# Patient Record
Sex: MALE | Race: WHITE | NOT HISPANIC OR LATINO | URBAN - METROPOLITAN AREA
[De-identification: names, ages, dates, MRNs, and addresses within clinical notes are randomized per-mention and may not be internally consistent; named-entity substitution may affect disease eponyms.]

---

## 2017-02-01 ENCOUNTER — HOSPITAL ENCOUNTER (OUTPATIENT)
Dept: MEDSURG UNIT | Facility: HOSPITAL | Age: 57
End: 2017-02-03
Attending: INTERNAL MEDICINE | Admitting: INTERNAL MEDICINE

## 2017-03-01 ENCOUNTER — HISTORICAL (OUTPATIENT)
Dept: SLEEP MEDICINE | Facility: HOSPITAL | Age: 57
End: 2017-03-01

## 2017-05-11 ENCOUNTER — HISTORICAL (OUTPATIENT)
Dept: ADMINISTRATIVE | Facility: HOSPITAL | Age: 57
End: 2017-05-11

## 2017-05-11 LAB
ABS NEUT (OLG): 0.78 X10(3)/MCL
ALBUMIN SERPL-MCNC: 4 GM/DL (ref 3.4–5)
ALBUMIN/GLOB SERPL: 1 {RATIO}
ALP SERPL-CCNC: 83 UNIT/L (ref 20–120)
ALT SERPL-CCNC: 24 UNIT/L
ANISOCYTOSIS BLD QL SMEAR: ABNORMAL
AST SERPL-CCNC: 34 UNIT/L
BASOPHILS NFR BLD MANUAL: 0 %
BILIRUB SERPL-MCNC: 0.9 MG/DL
BILIRUBIN DIRECT+TOT PNL SERPL-MCNC: 0.2 MG/DL
BILIRUBIN DIRECT+TOT PNL SERPL-MCNC: 0.7 MG/DL
BUN SERPL-MCNC: 11 MG/DL (ref 7–25)
CALCIUM SERPL-MCNC: 8.5 MG/DL (ref 8.4–10.3)
CHLORIDE SERPL-SCNC: 81 MMOL/L (ref 96–110)
CO2 SERPL-SCNC: 31 MMOL/L (ref 24–32)
CREAT SERPL-MCNC: 1.04 MG/DL (ref 0.7–1.4)
DACRYOCYTES BLD QL SMEAR: ABNORMAL
EOSINOPHIL NFR BLD MANUAL: 6 %
ERYTHROCYTE [DISTWIDTH] IN BLOOD BY AUTOMATED COUNT: 16.2 % (ref 11.5–14.5)
GLOBULIN SER-MCNC: 2.8 GM/ML (ref 2.3–3.5)
GLUCOSE SERPL-MCNC: 99 MG/DL (ref 65–99)
GRANULOCYTES NFR BLD MANUAL: 42 %
HCT VFR BLD AUTO: 28.5 % (ref 40–51)
HGB BLD-MCNC: 9.8 GM/DL (ref 13.5–17.5)
HYPOCHROMIA BLD QL SMEAR: ABNORMAL
LDH SERPL-CCNC: 297 UNIT/L
LYMPHOCYTES NFR BLD MANUAL: 10 %
LYMPHOCYTES NFR BLD MANUAL: 11 %
MACROCYTES BLD QL SMEAR: ABNORMAL
MCH RBC QN AUTO: 29.6 PG (ref 26–34)
MCHC RBC AUTO-ENTMCNC: 34.4 GM/DL (ref 31–37)
MCV RBC AUTO: 86.1 FL (ref 80–100)
METAMYELOCYTES NFR BLD MANUAL: 4 %
MICROCYTES BLD QL SMEAR: ABNORMAL
MONOCYTES NFR BLD MANUAL: 16 %
MYELOCYTES NFR BLD MANUAL: 0 %
NEUTS BAND NFR BLD MANUAL: 7 %
NRBC BLD MANUAL-RTO: 10 %
OVALOCYTES BLD QL SMEAR: ABNORMAL
PLATELET # BLD AUTO: 81 X10(3)/MCL (ref 130–400)
PLATELET # BLD EST: ABNORMAL 10*3/UL
PMV BLD AUTO: 10 FL (ref 7.4–10.4)
POIKILOCYTOSIS BLD QL SMEAR: ABNORMAL
POLYCHROMASIA BLD QL SMEAR: ABNORMAL
POTASSIUM SERPL-SCNC: 4.9 MMOL/L (ref 3.6–5.2)
PROT SERPL-MCNC: 6.8 GM/DL (ref 6–8)
RBC # BLD AUTO: 3.31 X10(6)/MCL (ref 4.5–5.9)
RBC MORPH BLD: ABNORMAL
SODIUM SERPL-SCNC: 121 MMOL/L (ref 135–146)
SPHEROCYTES BLD QL SMEAR: ABNORMAL
WBC # SPEC AUTO: 2 X10(3)/MCL (ref 4.5–11)

## 2017-05-25 ENCOUNTER — HISTORICAL (OUTPATIENT)
Dept: SURGERY | Facility: HOSPITAL | Age: 57
End: 2017-05-25

## 2017-05-25 LAB
INR PPP: 1.26 (ref 0.9–1.2)
PLATELET # BLD AUTO: 91 X10(3)/MCL (ref 130–400)
PROTHROMBIN TIME: 15.6 SECOND(S) (ref 11.9–14.4)

## 2017-06-08 ENCOUNTER — HISTORICAL (OUTPATIENT)
Dept: ADMINISTRATIVE | Facility: HOSPITAL | Age: 57
End: 2017-06-08

## 2017-06-08 LAB
ABS NEUT (OLG): 1 X10(3)/MCL (ref 2.1–9.2)
BASOPHILS # BLD AUTO: 0.01 X10(3)/MCL
BASOPHILS NFR BLD AUTO: 0 % (ref 0–1)
EOSINOPHIL # BLD AUTO: 0.01 10*3/UL
EOSINOPHIL NFR BLD AUTO: 0 % (ref 0–5)
ERYTHROCYTE [DISTWIDTH] IN BLOOD BY AUTOMATED COUNT: 16.1 % (ref 11.5–14.5)
HCT VFR BLD AUTO: 30.4 % (ref 40–51)
HGB BLD-MCNC: 10.6 GM/DL (ref 13.5–17.5)
HIV 1+2 AB+HIV1 P24 AG SERPL QL IA: NONREACTIVE
IMM GRANULOCYTES # BLD AUTO: 0.1 10*3/UL
IMM GRANULOCYTES NFR BLD AUTO: 5 %
LYMPHOCYTES # BLD AUTO: 0.67 X10(3)/MCL
LYMPHOCYTES NFR BLD AUTO: 31 % (ref 15–40)
MCH RBC QN AUTO: 30.3 PG (ref 26–34)
MCHC RBC AUTO-ENTMCNC: 34.9 GM/DL (ref 31–37)
MCV RBC AUTO: 86.9 FL (ref 80–100)
MONOCYTES # BLD AUTO: 0.38 X10(3)/MCL
MONOCYTES NFR BLD AUTO: 18 % (ref 4–12)
NEUTROPHILS # BLD AUTO: 1 X10(3)/MCL
NEUTROPHILS NFR BLD AUTO: 46 X10(3)/MCL
PLATELET # BLD AUTO: 102 X10(3)/MCL (ref 130–400)
PMV BLD AUTO: 9.6 FL (ref 7.4–10.4)
RBC # BLD AUTO: 3.5 X10(6)/MCL (ref 4.5–5.9)
WBC # SPEC AUTO: 2.2 X10(3)/MCL (ref 4.5–11)

## 2017-06-20 ENCOUNTER — HOSPITAL ENCOUNTER (OUTPATIENT)
Dept: MEDSURG UNIT | Facility: HOSPITAL | Age: 57
End: 2017-06-22
Attending: INTERNAL MEDICINE | Admitting: INTERNAL MEDICINE

## 2017-06-20 LAB
ABS NEUT (OLG): 0.49 X10(3)/MCL
ALBUMIN SERPL-MCNC: 4.2 GM/DL (ref 3.4–5)
ALBUMIN/GLOB SERPL: 2 {RATIO}
ALP SERPL-CCNC: 83 UNIT/L (ref 20–120)
ALT SERPL-CCNC: 22 UNIT/L
AMPHET UR QL SCN: NEGATIVE
ANISOCYTOSIS BLD QL SMEAR: NORMAL
APPEARANCE, UA: CLEAR
AST SERPL-CCNC: 33 UNIT/L
BACTERIA #/AREA URNS AUTO: ABNORMAL /[HPF]
BARBITURATE SCN PRESENT UR: NEGATIVE
BASOPHILS NFR BLD MANUAL: 0 %
BENZODIAZ UR QL SCN: NEGATIVE
BILIRUB SERPL-MCNC: 1.2 MG/DL
BILIRUB UR QL STRIP: NEGATIVE
BILIRUBIN DIRECT+TOT PNL SERPL-MCNC: 0.2 MG/DL
BILIRUBIN DIRECT+TOT PNL SERPL-MCNC: 1 MG/DL
BNP BLD-MCNC: 79.7 PG/ML (ref 0–100)
BUN SERPL-MCNC: 7 MG/DL (ref 7–25)
CALCIUM SERPL-MCNC: 8.3 MG/DL (ref 8.4–10.3)
CANNABINOIDS UR QL SCN: NEGATIVE
CHLORIDE SERPL-SCNC: 74 MMOL/L (ref 96–110)
CO2 SERPL-SCNC: 30 MMOL/L (ref 24–32)
COCAINE UR QL SCN: NEGATIVE
COLOR UR: COLORLESS
CREAT SERPL-MCNC: 0.73 MG/DL (ref 0.7–1.4)
CREAT UR-MCNC: 25.2 MG/DL
DACRYOCYTES BLD QL SMEAR: NORMAL
EOSINOPHIL NFR BLD MANUAL: 4 %
ERYTHROCYTE [DISTWIDTH] IN BLOOD BY AUTOMATED COUNT: 15.4 % (ref 11.5–14.5)
ETHANOL SERPL-MCNC: <5 MG/DL
GLOBULIN SER-MCNC: 2.7 GM/ML (ref 2.3–3.5)
GLUCOSE (UA): NORMAL
GLUCOSE SERPL-MCNC: 95 MG/DL (ref 65–99)
GRANULOCYTES NFR BLD MANUAL: 40 %
HCT VFR BLD AUTO: 29.6 % (ref 40–51)
HGB BLD-MCNC: 10.4 GM/DL (ref 13.5–17.5)
HGB UR QL STRIP: NEGATIVE
HYALINE CASTS #/AREA URNS LPF: ABNORMAL /[LPF]
HYPOCHROMIA BLD QL SMEAR: NORMAL
KETONES UR QL STRIP: NEGATIVE
LEUKOCYTE ESTERASE UR QL STRIP: NEGATIVE
LYMPHOCYTES NFR BLD MANUAL: 32 %
MACROCYTES BLD QL SMEAR: NORMAL
MAGNESIUM SERPL-MCNC: 1.6 MG/DL (ref 1.5–2.6)
MCH RBC QN AUTO: 29.9 PG (ref 26–34)
MCHC RBC AUTO-ENTMCNC: 35.1 GM/DL (ref 31–37)
MCV RBC AUTO: 85.1 FL (ref 80–100)
METAMYELOCYTES NFR BLD MANUAL: 4 %
MICROCYTES BLD QL SMEAR: NORMAL
MONOCYTES NFR BLD MANUAL: 12 %
NEUTS BAND NFR BLD MANUAL: 8 %
NITRITE UR QL STRIP: NEGATIVE
NRBC BLD MANUAL-RTO: 4 %
OPIATES UR QL SCN: NEGATIVE
OVALOCYTES BLD QL SMEAR: NORMAL
PCP UR QL: NEGATIVE
PH UR STRIP: 7 [PH] (ref 4.5–8)
PHOSPHATE SERPL-MCNC: 3.9 MG/DL (ref 2.5–4.7)
PLATELET # BLD AUTO: 94 X10(3)/MCL (ref 130–400)
PLATELET # BLD EST: NORMAL 10*3/UL
PMV BLD AUTO: 10.8 FL (ref 7.4–10.4)
POIKILOCYTOSIS BLD QL SMEAR: NORMAL
POLYCHROMASIA BLD QL SMEAR: NORMAL
POTASSIUM SERPL-SCNC: 4.3 MMOL/L (ref 3.6–5.2)
PROT SERPL-MCNC: 6.9 GM/DL (ref 6–8)
PROT UR QL STRIP: NEGATIVE
RBC # BLD AUTO: 3.48 X10(6)/MCL (ref 4.5–5.9)
RBC #/AREA URNS AUTO: ABNORMAL /[HPF]
RBC MORPH BLD: NORMAL
SCHISTOCYTES BLD QL AUTO: NORMAL
SODIUM SERPL-SCNC: 114 MMOL/L (ref 135–146)
SODIUM UR-SCNC: 18 MMOL/L
SP GR UR STRIP: 1 (ref 1–1.03)
SQUAMOUS #/AREA URNS LPF: <1 /[LPF]
T4 FREE SERPL-MCNC: 0.92 NG/DL (ref 0.6–1.15)
TARGETS BLD QL SMEAR: NORMAL
TSH SERPL-ACNC: 6.54 MIU/L (ref 0.5–5)
UROBILINOGEN UR STRIP-ACNC: NORMAL
WBC # SPEC AUTO: 1.2 X10(3)/MCL (ref 4.5–11)
WBC #/AREA URNS AUTO: ABNORMAL /HPF

## 2017-06-21 LAB
ABS NEUT (OLG): 0.49 X10(3)/MCL
ANISOCYTOSIS BLD QL SMEAR: ABNORMAL
ANISOCYTOSIS BLD QL SMEAR: NORMAL
BASOPHILS NFR BLD MANUAL: 0 %
BASOPHILS NFR BLD MANUAL: 1 %
BUN SERPL-MCNC: 6 MG/DL (ref 7–25)
BUN SERPL-MCNC: 7 MG/DL (ref 7–25)
CALCIUM SERPL-MCNC: 8.4 MG/DL (ref 8.4–10.3)
CALCIUM SERPL-MCNC: 8.7 MG/DL (ref 8.4–10.3)
CHLORIDE SERPL-SCNC: 79 MMOL/L (ref 96–110)
CHLORIDE SERPL-SCNC: 81 MMOL/L (ref 96–110)
CHOLEST SERPL-MCNC: 138 MG/DL
CHOLEST/HDLC SERPL: 1.5 {RATIO} (ref 0–5)
CO2 SERPL-SCNC: 30 MMOL/L (ref 24–32)
CO2 SERPL-SCNC: 33 MMOL/L (ref 24–32)
CREAT SERPL-MCNC: 0.8 MG/DL (ref 0.7–1.4)
CREAT SERPL-MCNC: 0.89 MG/DL (ref 0.7–1.4)
DACRYOCYTES BLD QL SMEAR: ABNORMAL
DACRYOCYTES BLD QL SMEAR: NORMAL
EOSINOPHIL NFR BLD MANUAL: 0 %
EOSINOPHIL NFR BLD MANUAL: 0 %
ERYTHROCYTE [DISTWIDTH] IN BLOOD BY AUTOMATED COUNT: 15.4 % (ref 11.5–14.5)
ERYTHROCYTE [DISTWIDTH] IN BLOOD BY AUTOMATED COUNT: 15.7 % (ref 11.5–14.5)
GLUCOSE SERPL-MCNC: 102 MG/DL (ref 65–99)
GLUCOSE SERPL-MCNC: 104 MG/DL (ref 65–99)
GRANULOCYTES NFR BLD MANUAL: 38 %
GRANULOCYTES NFR BLD MANUAL: 40 %
HCT VFR BLD AUTO: 28.5 % (ref 40–51)
HCT VFR BLD AUTO: 28.8 % (ref 40–51)
HDLC SERPL-MCNC: 93 MG/DL
HGB BLD-MCNC: 10.1 GM/DL (ref 13.5–17.5)
HGB BLD-MCNC: 9.9 GM/DL (ref 13.5–17.5)
LDLC SERPL CALC-MCNC: 38 MG/DL (ref 0–130)
LYMPHOCYTES NFR BLD MANUAL: 30 %
LYMPHOCYTES NFR BLD MANUAL: 37 %
LYMPHOCYTES NFR BLD MANUAL: 4 %
MCH RBC QN AUTO: 29.7 PG (ref 26–34)
MCH RBC QN AUTO: 29.9 PG (ref 26–34)
MCHC RBC AUTO-ENTMCNC: 34.7 GM/DL (ref 31–37)
MCHC RBC AUTO-ENTMCNC: 35.1 GM/DL (ref 31–37)
MCV RBC AUTO: 84.7 FL (ref 80–100)
MCV RBC AUTO: 86.1 FL (ref 80–100)
METAMYELOCYTES NFR BLD MANUAL: 1 %
METAMYELOCYTES NFR BLD MANUAL: 2 %
MICROCYTES BLD QL SMEAR: NORMAL
MONOCYTES NFR BLD MANUAL: 17 %
MONOCYTES NFR BLD MANUAL: 8 %
NEUTS BAND NFR BLD MANUAL: 18 %
NEUTS BAND NFR BLD MANUAL: 4 %
NRBC BLD MANUAL-RTO: 10 %
NRBC BLD MANUAL-RTO: 6 %
PLATELET # BLD AUTO: 66 X10(3)/MCL (ref 130–400)
PLATELET # BLD AUTO: 87 X10(3)/MCL (ref 130–400)
PLATELET # BLD EST: ABNORMAL 10*3/UL
PLATELET # BLD EST: NORMAL 10*3/UL
PMV BLD AUTO: 8.9 FL (ref 7.4–10.4)
PMV BLD AUTO: 9.9 FL (ref 7.4–10.4)
POIKILOCYTOSIS BLD QL SMEAR: NORMAL
POLYCHROMASIA BLD QL SMEAR: ABNORMAL
POLYCHROMASIA BLD QL SMEAR: NORMAL
POTASSIUM SERPL-SCNC: 4.2 MMOL/L (ref 3.6–5.2)
POTASSIUM SERPL-SCNC: 4.4 MMOL/L (ref 3.6–5.2)
RBC # BLD AUTO: 3.31 X10(6)/MCL (ref 4.5–5.9)
RBC # BLD AUTO: 3.4 X10(6)/MCL (ref 4.5–5.9)
RBC MORPH BLD: ABNORMAL
RBC MORPH BLD: NORMAL
SODIUM SERPL-SCNC: 118 MMOL/L (ref 135–146)
SODIUM SERPL-SCNC: 121 MMOL/L (ref 135–146)
SODIUM SERPL-SCNC: 122 MMOL/L (ref 135–146)
TRIGL SERPL-MCNC: 35 MG/DL
VLDLC SERPL CALC-MCNC: 7 MG/DL
WBC # SPEC AUTO: 1.1 X10(3)/MCL (ref 4.5–11)
WBC # SPEC AUTO: 1.2 X10(3)/MCL (ref 4.5–11)

## 2017-06-22 LAB
BUN SERPL-MCNC: 6 MG/DL (ref 7–25)
CALCIUM SERPL-MCNC: 8.6 MG/DL (ref 8.4–10.3)
CHLORIDE SERPL-SCNC: 83 MMOL/L (ref 96–110)
CO2 SERPL-SCNC: 32 MMOL/L (ref 24–32)
CREAT SERPL-MCNC: 0.95 MG/DL (ref 0.7–1.4)
GLUCOSE SERPL-MCNC: 121 MG/DL (ref 65–99)
POTASSIUM SERPL-SCNC: 4 MMOL/L (ref 3.6–5.2)
SODIUM SERPL-SCNC: 122 MMOL/L (ref 135–146)

## 2017-07-20 ENCOUNTER — HISTORICAL (OUTPATIENT)
Dept: ADMINISTRATIVE | Facility: HOSPITAL | Age: 57
End: 2017-07-20

## 2017-07-20 LAB
ABS NEUT (OLG): 0.78 X10(3)/MCL (ref 2.1–9.2)
ANISOCYTOSIS BLD QL SMEAR: ABNORMAL
BASOPHILS # BLD AUTO: 0.02 X10(3)/MCL
BASOPHILS NFR BLD AUTO: 1 % (ref 0–1)
BASOPHILS NFR BLD MANUAL: 1 %
EOSINOPHIL # BLD AUTO: 0.01 X10(3)/MCL
EOSINOPHIL NFR BLD AUTO: 0 % (ref 0–5)
EOSINOPHIL NFR BLD MANUAL: 0 %
ERYTHROCYTE [DISTWIDTH] IN BLOOD BY AUTOMATED COUNT: 15.4 % (ref 11.5–14.5)
GRANULOCYTES NFR BLD MANUAL: 33 % (ref 43–75)
HCT VFR BLD AUTO: 30.3 % (ref 40–51)
HGB BLD-MCNC: 10.3 GM/DL (ref 13.5–17.5)
IMM GRANULOCYTES # BLD AUTO: 0.13 10*3/UL
IMM GRANULOCYTES NFR BLD AUTO: 7 %
LYMPHOCYTES # BLD AUTO: 0.59 X10(3)/MCL
LYMPHOCYTES NFR BLD AUTO: 30 % (ref 15–40)
LYMPHOCYTES NFR BLD MANUAL: 33 % (ref 20.5–51.1)
LYMPHOCYTES NFR BLD MANUAL: 4 %
MCH RBC QN AUTO: 29.6 PG (ref 26–34)
MCHC RBC AUTO-ENTMCNC: 34 GM/DL (ref 31–37)
MCV RBC AUTO: 87.1 FL (ref 80–100)
METAMYELOCYTES NFR BLD MANUAL: 2 %
MONOCYTES # BLD AUTO: 0.42 X10(3)/MCL
MONOCYTES NFR BLD AUTO: 22 % (ref 4–12)
MONOCYTES NFR BLD MANUAL: 12 % (ref 2–9)
MYELOCYTES NFR BLD MANUAL: 3 %
NEUTROPHILS # BLD AUTO: 0.78 X10(3)/MCL
NEUTROPHILS NFR BLD AUTO: 40 X10(3)/MCL
NEUTS BAND NFR BLD MANUAL: 12 % (ref 0–10)
NRBC BLD MANUAL-RTO: 3 %
PLATELET # BLD AUTO: 85 X10(3)/MCL (ref 130–400)
PLATELET # BLD EST: ABNORMAL 10*3/UL
PMV BLD AUTO: 8.9 FL (ref 7.4–10.4)
POLYCHROMASIA BLD QL SMEAR: ABNORMAL
RBC # BLD AUTO: 3.48 X10(6)/MCL (ref 4.5–5.9)
RBC MORPH BLD: ABNORMAL
WBC # SPEC AUTO: 2 X10(3)/MCL (ref 4.5–11)

## 2017-12-27 ENCOUNTER — HOSPITAL ENCOUNTER (OUTPATIENT)
Dept: MEDSURG UNIT | Facility: HOSPITAL | Age: 57
End: 2017-12-28
Attending: INTERNAL MEDICINE | Admitting: HOSPITALIST

## 2017-12-27 LAB
ABS NEUT (OLG): 1.76 X10(3)/MCL
ALBUMIN SERPL-MCNC: 3.1 GM/DL (ref 3.4–5)
ALBUMIN/GLOB SERPL: 1 RATIO (ref 1–2)
ALP SERPL-CCNC: 109 UNIT/L (ref 45–117)
ALT SERPL-CCNC: 13 UNIT/L (ref 12–78)
ANISOCYTOSIS BLD QL SMEAR: NORMAL
APPEARANCE, UA: CLEAR
AST SERPL-CCNC: 20 UNIT/L (ref 15–37)
BACTERIA #/AREA URNS AUTO: ABNORMAL /[HPF]
BASOPHILS NFR BLD MANUAL: 0 %
BILIRUB SERPL-MCNC: 0.4 MG/DL (ref 0.2–1)
BILIRUB UR QL STRIP: NEGATIVE
BILIRUBIN DIRECT+TOT PNL SERPL-MCNC: 0.1 MG/DL
BILIRUBIN DIRECT+TOT PNL SERPL-MCNC: 0.3 MG/DL
BUN SERPL-MCNC: 13 MG/DL (ref 7–18)
CALCIUM SERPL-MCNC: 8.6 MG/DL (ref 8.5–10.1)
CHLORIDE SERPL-SCNC: 100 MMOL/L (ref 98–107)
CK MB SERPL-MCNC: <1 NG/ML (ref 1–3.6)
CK SERPL-CCNC: 32 UNIT/L (ref 39–308)
CO2 SERPL-SCNC: 28 MMOL/L (ref 21–32)
COLOR UR: NORMAL
CREAT SERPL-MCNC: 0.6 MG/DL (ref 0.6–1.3)
DACRYOCYTES BLD QL SMEAR: NORMAL
EOSINOPHIL NFR BLD MANUAL: 1 %
ERYTHROCYTE [DISTWIDTH] IN BLOOD BY AUTOMATED COUNT: 18.3 % (ref 11.5–14.5)
GLOBULIN SER-MCNC: 3.9 GM/ML (ref 2.3–3.5)
GLUCOSE (UA): NORMAL
GLUCOSE SERPL-MCNC: 105 MG/DL (ref 74–106)
GRANULOCYTES NFR BLD MANUAL: 60 % (ref 43–75)
HCT VFR BLD AUTO: 27.9 % (ref 40–51)
HGB BLD-MCNC: 8.6 GM/DL (ref 13.5–17.5)
HGB UR QL STRIP: NEGATIVE
HYALINE CASTS #/AREA URNS LPF: ABNORMAL /[LPF]
HYPOCHROMIA BLD QL SMEAR: NORMAL
KETONES UR QL STRIP: NEGATIVE
LEUKOCYTE ESTERASE UR QL STRIP: NEGATIVE
LYMPHOCYTES NFR BLD MANUAL: 25 % (ref 20.5–51.1)
MCH RBC QN AUTO: 26.6 PG (ref 26–34)
MCHC RBC AUTO-ENTMCNC: 30.8 GM/DL (ref 31–37)
MCV RBC AUTO: 86.4 FL (ref 80–100)
MONOCYTES NFR BLD MANUAL: 5 % (ref 2–9)
MYELOCYTES NFR BLD MANUAL: 2 %
NEUTS BAND NFR BLD MANUAL: 7 % (ref 0–10)
NITRITE UR QL STRIP: NEGATIVE
NRBC BLD MANUAL-RTO: 5 %
PH UR STRIP: 6 [PH] (ref 4.5–8)
PLATELET # BLD AUTO: 318 X10(3)/MCL (ref 130–400)
PLATELET # BLD EST: NORMAL 10*3/UL
PMV BLD AUTO: 10.4 FL (ref 7.4–10.4)
POC TROPONIN: 0 NG/ML (ref 0–0.08)
POIKILOCYTOSIS BLD QL SMEAR: NORMAL
POLYCHROMASIA BLD QL SMEAR: NORMAL
POTASSIUM SERPL-SCNC: 4.4 MMOL/L (ref 3.5–5.1)
PROT SERPL-MCNC: 7 GM/DL (ref 6.4–8.2)
PROT UR QL STRIP: NEGATIVE
RBC # BLD AUTO: 3.23 X10(6)/MCL (ref 4.5–5.9)
RBC #/AREA URNS AUTO: ABNORMAL /[HPF]
RBC MORPH BLD: NORMAL
SCHISTOCYTES BLD QL AUTO: NORMAL
SODIUM SERPL-SCNC: 137 MMOL/L (ref 136–145)
SP GR UR STRIP: 1.01 (ref 1–1.03)
SQUAMOUS #/AREA URNS LPF: ABNORMAL /[LPF]
TROPONIN I SERPL-MCNC: <0.015 NG/ML (ref 0–0.05)
UROBILINOGEN UR STRIP-ACNC: NORMAL
WBC # SPEC AUTO: 3 X10(3)/MCL (ref 4.5–11)
WBC #/AREA URNS AUTO: ABNORMAL /HPF

## 2017-12-28 LAB
ABS NEUT (OLG): 1.33 X10(3)/MCL (ref 2.1–9.2)
BASOPHILS # BLD AUTO: 0.03 X10(3)/MCL
BASOPHILS NFR BLD AUTO: 1 % (ref 0–1)
BUN SERPL-MCNC: 11 MG/DL (ref 7–18)
CALCIUM SERPL-MCNC: 8.9 MG/DL (ref 8.5–10.1)
CHLORIDE SERPL-SCNC: 96 MMOL/L (ref 98–107)
CO2 SERPL-SCNC: 37 MMOL/L (ref 21–32)
CREAT SERPL-MCNC: 0.7 MG/DL (ref 0.6–1.3)
EOSINOPHIL # BLD AUTO: 0.01 X10(3)/MCL
EOSINOPHIL NFR BLD AUTO: 0 % (ref 0–5)
ERYTHROCYTE [DISTWIDTH] IN BLOOD BY AUTOMATED COUNT: 18.3 % (ref 11.5–14.5)
GLUCOSE SERPL-MCNC: 102 MG/DL (ref 74–106)
HCT VFR BLD AUTO: 27 % (ref 40–51)
HGB BLD-MCNC: 8.6 GM/DL (ref 13.5–17.5)
IMM GRANULOCYTES # BLD AUTO: 0.21 10*3/UL
IMM GRANULOCYTES NFR BLD AUTO: 8 %
LYMPHOCYTES # BLD AUTO: 0.52 X10(3)/MCL
LYMPHOCYTES NFR BLD AUTO: 19 % (ref 15–40)
MCH RBC QN AUTO: 27.3 PG (ref 26–34)
MCHC RBC AUTO-ENTMCNC: 31.9 GM/DL (ref 31–37)
MCV RBC AUTO: 85.7 FL (ref 80–100)
MONOCYTES # BLD AUTO: 0.58 X10(3)/MCL
MONOCYTES NFR BLD AUTO: 22 % (ref 4–12)
NEUTROPHILS # BLD AUTO: 1.33 X10(3)/MCL
NEUTROPHILS NFR BLD AUTO: 50 X10(3)/MCL
PLATELET # BLD AUTO: 287 X10(3)/MCL (ref 130–400)
PMV BLD AUTO: 9.6 FL (ref 7.4–10.4)
POTASSIUM SERPL-SCNC: 4.3 MMOL/L (ref 3.5–5.1)
RBC # BLD AUTO: 3.15 X10(6)/MCL (ref 4.5–5.9)
SODIUM SERPL-SCNC: 138 MMOL/L (ref 136–145)
WBC # SPEC AUTO: 2.7 X10(3)/MCL (ref 4.5–11)

## 2018-09-28 ENCOUNTER — HISTORICAL (OUTPATIENT)
Dept: INTERNAL MEDICINE | Facility: CLINIC | Age: 58
End: 2018-09-28

## 2018-09-28 LAB
ABS NEUT (OLG): 2.36 X10(3)/MCL (ref 2.1–9.2)
ALBUMIN SERPL-MCNC: 4 GM/DL (ref 3.4–5)
ALBUMIN/GLOB SERPL: 1 RATIO (ref 1–2)
ALP SERPL-CCNC: 133 UNIT/L (ref 45–117)
ALT SERPL-CCNC: 26 UNIT/L (ref 12–78)
ANISOCYTOSIS BLD QL SMEAR: ABNORMAL
AST SERPL-CCNC: 20 UNIT/L (ref 15–37)
BASOPHILS NFR BLD MANUAL: 0 %
BILIRUB SERPL-MCNC: 0.7 MG/DL (ref 0.2–1)
BILIRUBIN DIRECT+TOT PNL SERPL-MCNC: 0.2 MG/DL
BILIRUBIN DIRECT+TOT PNL SERPL-MCNC: 0.5 MG/DL
BUN SERPL-MCNC: 6 MG/DL (ref 7–18)
CALCIUM SERPL-MCNC: 8.9 MG/DL (ref 8.5–10.1)
CHLORIDE SERPL-SCNC: 93 MMOL/L (ref 98–107)
CHOLEST SERPL-MCNC: 139 MG/DL
CHOLEST/HDLC SERPL: 1.6 {RATIO} (ref 0–5)
CO2 SERPL-SCNC: 32 MMOL/L (ref 21–32)
CREAT SERPL-MCNC: 0.9 MG/DL (ref 0.6–1.3)
EOSINOPHIL NFR BLD MANUAL: 0 %
ERYTHROCYTE [DISTWIDTH] IN BLOOD BY AUTOMATED COUNT: 18.5 % (ref 11.5–14.5)
EST. AVERAGE GLUCOSE BLD GHB EST-MCNC: 100 MG/DL
GLOBULIN SER-MCNC: 3.5 GM/ML (ref 2.3–3.5)
GLUCOSE SERPL-MCNC: 112 MG/DL (ref 74–106)
GRANULOCYTES NFR BLD MANUAL: 42 % (ref 43–75)
HBA1C MFR BLD: 5.1 % (ref 4.2–6.3)
HCT VFR BLD AUTO: 35.7 % (ref 40–51)
HDLC SERPL-MCNC: 85 MG/DL
HGB BLD-MCNC: 11.8 GM/DL (ref 13.5–17.5)
LDLC SERPL CALC-MCNC: 38 MG/DL (ref 0–130)
LYMPHOCYTES NFR BLD MANUAL: 2 %
LYMPHOCYTES NFR BLD MANUAL: 26 % (ref 20.5–51.1)
MCH RBC QN AUTO: 28.2 PG (ref 26–34)
MCHC RBC AUTO-ENTMCNC: 33.1 GM/DL (ref 31–37)
MCV RBC AUTO: 85.2 FL (ref 80–100)
METAMYELOCYTES NFR BLD MANUAL: 2 %
MONOCYTES NFR BLD MANUAL: 12 % (ref 2–9)
NEUTS BAND NFR BLD MANUAL: 16 % (ref 0–10)
NRBC BLD MANUAL-RTO: 4 %
PLATELET # BLD AUTO: 361 X10(3)/MCL (ref 130–400)
PLATELET # BLD EST: ADEQUATE 10*3/UL
PMV BLD AUTO: 9.4 FL (ref 7.4–10.4)
POLYCHROMASIA BLD QL SMEAR: ABNORMAL
POTASSIUM SERPL-SCNC: 5.1 MMOL/L (ref 3.5–5.1)
PROT SERPL-MCNC: 7.5 GM/DL (ref 6.4–8.2)
PSA SERPL-MCNC: 0.2 NG/ML
RBC # BLD AUTO: 4.19 X10(6)/MCL (ref 4.5–5.9)
RBC MORPH BLD: ABNORMAL
SODIUM SERPL-SCNC: 130 MMOL/L (ref 136–145)
TRIGL SERPL-MCNC: 82 MG/DL
TSH SERPL-ACNC: 2.78 MIU/L (ref 0.36–3.74)
VLDLC SERPL CALC-MCNC: 16 MG/DL
WBC # SPEC AUTO: 3.8 X10(3)/MCL (ref 4.5–11)

## 2019-03-29 ENCOUNTER — HISTORICAL (OUTPATIENT)
Dept: INTERNAL MEDICINE | Facility: CLINIC | Age: 59
End: 2019-03-29

## 2019-03-29 LAB
ABS NEUT (OLG): 1.4 X10(3)/MCL (ref 2.1–9.2)
ALBUMIN SERPL-MCNC: 4.1 GM/DL (ref 3.4–5)
ALBUMIN/GLOB SERPL: 1.2 RATIO (ref 1.1–2)
ALP SERPL-CCNC: 120 UNIT/L (ref 45–117)
ALT SERPL-CCNC: 20 UNIT/L (ref 12–78)
AST SERPL-CCNC: 22 UNIT/L (ref 15–37)
BASOPHILS # BLD AUTO: 0.02 X10(3)/MCL
BASOPHILS NFR BLD AUTO: 1 %
BILIRUB SERPL-MCNC: 0.6 MG/DL (ref 0.2–1)
BILIRUBIN DIRECT+TOT PNL SERPL-MCNC: 0.2 MG/DL
BILIRUBIN DIRECT+TOT PNL SERPL-MCNC: 0.4 MG/DL
BUN SERPL-MCNC: 11 MG/DL (ref 7–18)
CALCIUM SERPL-MCNC: 8.3 MG/DL (ref 8.5–10.1)
CHLORIDE SERPL-SCNC: 92 MMOL/L (ref 98–107)
CHOLEST SERPL-MCNC: 160 MG/DL
CHOLEST/HDLC SERPL: 1.6 {RATIO} (ref 0–5)
CO2 SERPL-SCNC: 29 MMOL/L (ref 21–32)
CREAT SERPL-MCNC: 0.8 MG/DL (ref 0.6–1.3)
EOSINOPHIL # BLD AUTO: 0.01 10*3/UL
EOSINOPHIL NFR BLD AUTO: 0 %
ERYTHROCYTE [DISTWIDTH] IN BLOOD BY AUTOMATED COUNT: 15.8 % (ref 11.5–14.5)
EST. AVERAGE GLUCOSE BLD GHB EST-MCNC: 97 MG/DL
GLOBULIN SER-MCNC: 3.4 GM/ML (ref 2.3–3.5)
GLUCOSE SERPL-MCNC: 101 MG/DL (ref 74–106)
HBA1C MFR BLD: 5 % (ref 4.2–6.3)
HCT VFR BLD AUTO: 34.5 % (ref 40–51)
HDLC SERPL-MCNC: 103 MG/DL
HGB BLD-MCNC: 11.5 GM/DL (ref 13.5–17.5)
IMM GRANULOCYTES # BLD AUTO: 0.13 10*3/UL
IMM GRANULOCYTES NFR BLD AUTO: 5 %
LDLC SERPL CALC-MCNC: 43 MG/DL (ref 0–130)
LYMPHOCYTES # BLD AUTO: 0.63 X10(3)/MCL
LYMPHOCYTES NFR BLD AUTO: 24 % (ref 13–40)
MCH RBC QN AUTO: 29.5 PG (ref 26–34)
MCHC RBC AUTO-ENTMCNC: 33.3 GM/DL (ref 31–37)
MCV RBC AUTO: 88.5 FL (ref 80–100)
MONOCYTES # BLD AUTO: 0.45 X10(3)/MCL
MONOCYTES NFR BLD AUTO: 17 % (ref 4–12)
NEUTROPHILS # BLD AUTO: 1.4 X10(3)/MCL
NEUTROPHILS NFR BLD AUTO: 53 X10(3)/MCL
PLATELET # BLD AUTO: 294 X10(3)/MCL (ref 130–400)
PMV BLD AUTO: 9.9 FL (ref 7.4–10.4)
POTASSIUM SERPL-SCNC: 4.4 MMOL/L (ref 3.5–5.1)
PROT SERPL-MCNC: 7.5 GM/DL (ref 6.4–8.2)
RBC # BLD AUTO: 3.9 X10(6)/MCL (ref 4.5–5.9)
SODIUM SERPL-SCNC: 127 MMOL/L (ref 136–145)
T3FREE SERPL-MCNC: 2.74 PG/ML (ref 2.18–3.98)
TRIGL SERPL-MCNC: 68 MG/DL
TSH SERPL-ACNC: 2.59 MIU/L (ref 0.36–3.74)
VLDLC SERPL CALC-MCNC: 14 MG/DL
WBC # SPEC AUTO: 2.6 X10(3)/MCL (ref 4.5–11)

## 2019-06-08 ENCOUNTER — HOSPITAL ENCOUNTER (OUTPATIENT)
Dept: MEDSURG UNIT | Facility: HOSPITAL | Age: 59
End: 2019-06-09
Attending: INTERNAL MEDICINE | Admitting: INTERNAL MEDICINE

## 2019-06-08 LAB
ABS NEUT (OLG): 0.89 X10(3)/MCL
ALBUMIN SERPL-MCNC: 3.8 GM/DL (ref 3.4–5)
ALBUMIN/GLOB SERPL: 1.4 RATIO (ref 1.1–2)
ALP SERPL-CCNC: 109 UNIT/L (ref 45–117)
ALT SERPL-CCNC: 26 UNIT/L (ref 12–78)
AMMONIA PLAS-MSCNC: 47 MMOL/L (ref 11–32)
AMPHET UR QL SCN: NEGATIVE
ANISOCYTOSIS BLD QL SMEAR: ABNORMAL
APPEARANCE, UA: CLEAR
APTT PPP: 33.1 SECOND(S) (ref 23.3–37)
AST SERPL-CCNC: 25 UNIT/L (ref 15–37)
BACTERIA #/AREA URNS AUTO: ABNORMAL /[HPF]
BARBITURATE SCN PRESENT UR: NEGATIVE
BASOPHILS NFR BLD MANUAL: 5 %
BENZODIAZ UR QL SCN: NEGATIVE
BILIRUB SERPL-MCNC: 0.6 MG/DL (ref 0.2–1)
BILIRUB UR QL STRIP: NEGATIVE
BILIRUBIN DIRECT+TOT PNL SERPL-MCNC: 0.3 MG/DL
BILIRUBIN DIRECT+TOT PNL SERPL-MCNC: 0.3 MG/DL
BUN SERPL-MCNC: 10 MG/DL (ref 7–18)
BUN SERPL-MCNC: 10 MG/DL (ref 7–18)
BUN SERPL-MCNC: 11 MG/DL (ref 7–18)
BUN SERPL-MCNC: 9 MG/DL (ref 7–18)
BUN SERPL-MCNC: 9 MG/DL (ref 7–18)
CALCIUM SERPL-MCNC: 7.9 MG/DL (ref 8.5–10.1)
CALCIUM SERPL-MCNC: 8.3 MG/DL (ref 8.5–10.1)
CALCIUM SERPL-MCNC: 8.3 MG/DL (ref 8.5–10.1)
CALCIUM SERPL-MCNC: 8.5 MG/DL (ref 8.5–10.1)
CALCIUM SERPL-MCNC: 8.6 MG/DL (ref 8.5–10.1)
CANNABINOIDS UR QL SCN: NEGATIVE
CHLORIDE SERPL-SCNC: 93 MMOL/L (ref 98–107)
CHLORIDE SERPL-SCNC: 93 MMOL/L (ref 98–107)
CHLORIDE SERPL-SCNC: 95 MMOL/L (ref 98–107)
CHLORIDE SERPL-SCNC: 96 MMOL/L (ref 98–107)
CHLORIDE SERPL-SCNC: 96 MMOL/L (ref 98–107)
CK MB SERPL-MCNC: 2.2 NG/ML (ref 1–3.6)
CK SERPL-CCNC: 171 UNIT/L (ref 39–308)
CO2 SERPL-SCNC: 24 MMOL/L (ref 21–32)
CO2 SERPL-SCNC: 25 MMOL/L (ref 21–32)
CO2 SERPL-SCNC: 27 MMOL/L (ref 21–32)
CO2 SERPL-SCNC: 27 MMOL/L (ref 21–32)
CO2 SERPL-SCNC: 29 MMOL/L (ref 21–32)
COCAINE UR QL SCN: NEGATIVE
COLOR UR: ABNORMAL
CREAT SERPL-MCNC: 0.7 MG/DL (ref 0.6–1.3)
CREAT SERPL-MCNC: 0.8 MG/DL (ref 0.6–1.3)
CREAT/UREA NIT SERPL: 11
CREAT/UREA NIT SERPL: 11
CREAT/UREA NIT SERPL: 12
CREAT/UREA NIT SERPL: 14
EOSINOPHIL NFR BLD MANUAL: 0 %
ERYTHROCYTE [DISTWIDTH] IN BLOOD BY AUTOMATED COUNT: 16.6 % (ref 11.5–14.5)
ETHANOL SERPL-MCNC: <3 MG/DL
GLOBULIN SER-MCNC: 2.8 GM/ML (ref 2.3–3.5)
GLUCOSE (UA): NORMAL
GLUCOSE SERPL-MCNC: 105 MG/DL (ref 74–106)
GLUCOSE SERPL-MCNC: 106 MG/DL (ref 74–106)
GLUCOSE SERPL-MCNC: 176 MG/DL (ref 74–106)
GLUCOSE SERPL-MCNC: 88 MG/DL (ref 74–106)
GLUCOSE SERPL-MCNC: 99 MG/DL (ref 74–106)
GRANULOCYTES NFR BLD MANUAL: 38 % (ref 43–75)
HCT VFR BLD AUTO: 29.5 % (ref 40–51)
HGB BLD-MCNC: 10 GM/DL (ref 13.5–17.5)
HGB UR QL STRIP: NEGATIVE
HYALINE CASTS #/AREA URNS LPF: ABNORMAL /[LPF]
INR PPP: 1.3 (ref 0.9–1.2)
KETONES UR QL STRIP: 10 MG/DL
LEUKOCYTE ESTERASE UR QL STRIP: NEGATIVE
LYMPHOCYTES NFR BLD MANUAL: 1 %
LYMPHOCYTES NFR BLD MANUAL: 35 % (ref 20.5–51.1)
MAGNESIUM SERPL-MCNC: 1.9 MG/DL (ref 1.6–2.6)
MCH RBC QN AUTO: 30.1 PG (ref 26–34)
MCHC RBC AUTO-ENTMCNC: 33.9 GM/DL (ref 31–37)
MCV RBC AUTO: 88.9 FL (ref 80–100)
MONOCYTES NFR BLD MANUAL: 10 % (ref 2–9)
MYELOCYTES NFR BLD MANUAL: 1 %
NEUTS BAND NFR BLD MANUAL: 10 % (ref 0–10)
NITRITE UR QL STRIP: NEGATIVE
NRBC BLD MANUAL-RTO: 9 %
OPIATES UR QL SCN: NEGATIVE
OSMOLALITY SERPL: 260 MOSM/KG (ref 280–300)
OSMOLALITY UR: 247 MOSM/KG (ref 300–1300)
PCP UR QL: NEGATIVE
PH UR STRIP.AUTO: 6 [PH] (ref 5–8)
PH UR STRIP: 6 [PH] (ref 4.5–8)
PHOSPHATE SERPL-MCNC: 4.2 MG/DL (ref 2.5–4.9)
PLATELET # BLD AUTO: 140 X10(3)/MCL (ref 130–400)
PLATELET # BLD EST: ADEQUATE 10*3/UL
PMV BLD AUTO: 9.9 FL (ref 7.4–10.4)
POIKILOCYTOSIS BLD QL SMEAR: ABNORMAL
POLYCHROMASIA BLD QL SMEAR: ABNORMAL
POTASSIUM SERPL-SCNC: 3.5 MMOL/L (ref 3.5–5.1)
POTASSIUM SERPL-SCNC: 3.6 MMOL/L (ref 3.5–5.1)
POTASSIUM SERPL-SCNC: 3.6 MMOL/L (ref 3.5–5.1)
POTASSIUM SERPL-SCNC: 4.2 MMOL/L (ref 3.5–5.1)
POTASSIUM SERPL-SCNC: 4.8 MMOL/L (ref 3.5–5.1)
PROT SERPL-MCNC: 6.6 GM/DL (ref 6.4–8.2)
PROT UR QL STRIP: NEGATIVE
PROTHROMBIN TIME: 16.1 SECOND(S) (ref 11.9–14.4)
RBC # BLD AUTO: 3.32 X10(6)/MCL (ref 4.5–5.9)
RBC #/AREA URNS AUTO: ABNORMAL /[HPF]
RBC MORPH BLD: ABNORMAL
SCHISTOCYTES BLD QL AUTO: ABNORMAL
SODIUM SERPL-SCNC: 126 MMOL/L (ref 136–145)
SODIUM SERPL-SCNC: 127 MMOL/L (ref 136–145)
SODIUM SERPL-SCNC: 127 MMOL/L (ref 136–145)
SODIUM SERPL-SCNC: 128 MMOL/L (ref 136–145)
SODIUM SERPL-SCNC: 129 MMOL/L (ref 136–145)
SODIUM UR-SCNC: 16 MMOL/L
SP GR UR STRIP: 1.01 (ref 1–1.03)
SQUAMOUS #/AREA URNS LPF: ABNORMAL /[LPF]
TEMPERATURE, URINE (OHS): 24.3 DEGC (ref 20–25)
TROPONIN I SERPL-MCNC: <0.015 NG/ML (ref 0–0.05)
UROBILINOGEN UR STRIP-ACNC: NORMAL
WBC # SPEC AUTO: 1.9 X10(3)/MCL (ref 4.5–11)
WBC #/AREA URNS AUTO: ABNORMAL /HPF

## 2019-06-09 LAB
ABS NEUT (OLG): 0.79 X10(3)/MCL
ANISOCYTOSIS BLD QL SMEAR: ABNORMAL
BASOPHILS NFR BLD MANUAL: 0 %
BUN SERPL-MCNC: 8 MG/DL (ref 7–18)
BUN SERPL-MCNC: 8 MG/DL (ref 7–18)
CALCIUM SERPL-MCNC: 8.4 MG/DL (ref 8.5–10.1)
CALCIUM SERPL-MCNC: 8.8 MG/DL (ref 8.5–10.1)
CHLORIDE SERPL-SCNC: 95 MMOL/L (ref 98–107)
CHLORIDE SERPL-SCNC: 97 MMOL/L (ref 98–107)
CO2 SERPL-SCNC: 27 MMOL/L (ref 21–32)
CO2 SERPL-SCNC: 29 MMOL/L (ref 21–32)
CREAT SERPL-MCNC: 0.7 MG/DL (ref 0.6–1.3)
CREAT SERPL-MCNC: 0.8 MG/DL (ref 0.6–1.3)
CREAT/UREA NIT SERPL: 10
CREAT/UREA NIT SERPL: 11
EOSINOPHIL NFR BLD MANUAL: 0 %
ERYTHROCYTE [DISTWIDTH] IN BLOOD BY AUTOMATED COUNT: 17 % (ref 11.5–14.5)
GLUCOSE SERPL-MCNC: 97 MG/DL (ref 74–106)
GLUCOSE SERPL-MCNC: 97 MG/DL (ref 74–106)
GRANULOCYTES NFR BLD MANUAL: 34 % (ref 43–75)
HCT VFR BLD AUTO: 30.1 % (ref 40–51)
HGB BLD-MCNC: 10.1 GM/DL (ref 13.5–17.5)
HYPOCHROMIA BLD QL SMEAR: ABNORMAL
LYMPHOCYTES NFR BLD MANUAL: 1 %
LYMPHOCYTES NFR BLD MANUAL: 38 % (ref 20.5–51.1)
MCH RBC QN AUTO: 30.8 PG (ref 26–34)
MCHC RBC AUTO-ENTMCNC: 33.6 GM/DL (ref 31–37)
MCV RBC AUTO: 91.8 FL (ref 80–100)
MONOCYTES NFR BLD MANUAL: 15 % (ref 2–9)
MYELOCYTES NFR BLD MANUAL: 3 %
NEUTS BAND NFR BLD MANUAL: 9 % (ref 0–10)
NRBC BLD MANUAL-RTO: 10 %
PLATELET # BLD AUTO: 168 X10(3)/MCL (ref 130–400)
PLATELET # BLD EST: ADEQUATE 10*3/UL
PMV BLD AUTO: 10.5 FL (ref 7.4–10.4)
POIKILOCYTOSIS BLD QL SMEAR: ABNORMAL
POLYCHROMASIA BLD QL SMEAR: ABNORMAL
POTASSIUM SERPL-SCNC: 4.5 MMOL/L (ref 3.5–5.1)
POTASSIUM SERPL-SCNC: 4.6 MMOL/L (ref 3.5–5.1)
RBC # BLD AUTO: 3.28 X10(6)/MCL (ref 4.5–5.9)
RBC MORPH BLD: ABNORMAL
SCHISTOCYTES BLD QL AUTO: ABNORMAL
SODIUM SERPL-SCNC: 128 MMOL/L (ref 136–145)
SODIUM SERPL-SCNC: 130 MMOL/L (ref 136–145)
WBC # SPEC AUTO: 2.3 X10(3)/MCL (ref 4.5–11)

## 2020-01-01 ENCOUNTER — HISTORICAL (OUTPATIENT)
Dept: INFUSION THERAPY | Facility: HOSPITAL | Age: 60
End: 2020-01-01

## 2020-01-01 ENCOUNTER — HISTORICAL (OUTPATIENT)
Dept: ADMINISTRATIVE | Facility: HOSPITAL | Age: 60
End: 2020-01-01

## 2020-01-01 ENCOUNTER — HISTORICAL (OUTPATIENT)
Dept: CARDIOLOGY | Facility: HOSPITAL | Age: 60
End: 2020-01-01

## 2020-01-01 LAB
ABS NEUT (OLG): 0.85 X10(3)/MCL (ref 2.1–9.2)
ABS NEUT (OLG): 1.09 X10(3)/MCL (ref 2.1–9.2)
ABS NEUT (OLG): 1.11 X10(3)/MCL (ref 2.1–9.2)
ABS NEUT (OLG): 1.12 X10(3)/MCL (ref 2.1–9.2)
ABS NEUT (OLG): 1.16 X10(3)/MCL (ref 2.1–9.2)
ABS NEUT (OLG): 1.19 X10(3)/MCL (ref 2.1–9.2)
ABS NEUT (OLG): 4.5 X10(3)/MCL (ref 2.1–9.2)
ALBUMIN SERPL-MCNC: 2.9 GM/DL (ref 3.5–5)
ALBUMIN SERPL-MCNC: 3.4 GM/DL (ref 3.5–5)
ALBUMIN SERPL-MCNC: 3.6 GM/DL (ref 3.5–5)
ALBUMIN/GLOB SERPL: 0.8 RATIO (ref 1.1–2)
ALBUMIN/GLOB SERPL: 1 RATIO (ref 1.1–2)
ALBUMIN/GLOB SERPL: 1.2 RATIO (ref 1.1–2)
ALP SERPL-CCNC: 102 UNIT/L (ref 40–150)
ALP SERPL-CCNC: 103 UNIT/L (ref 40–150)
ALP SERPL-CCNC: 96 UNIT/L (ref 40–150)
ALT SERPL-CCNC: 11 UNIT/L (ref 0–55)
ALT SERPL-CCNC: 11 UNIT/L (ref 0–55)
ALT SERPL-CCNC: 6 UNIT/L (ref 0–55)
ANISOCYTOSIS BLD QL SMEAR: ABNORMAL
AST SERPL-CCNC: 15 UNIT/L (ref 5–34)
AST SERPL-CCNC: 21 UNIT/L (ref 5–34)
AST SERPL-CCNC: 22 UNIT/L (ref 5–34)
BASOPHILS NFR BLD MANUAL: 0 %
BASOPHILS NFR BLD MANUAL: 1 %
BASOPHILS NFR BLD MANUAL: 2 %
BILIRUB SERPL-MCNC: 0.4 MG/DL
BILIRUB SERPL-MCNC: 0.4 MG/DL
BILIRUB SERPL-MCNC: 0.7 MG/DL
BILIRUBIN DIRECT+TOT PNL SERPL-MCNC: 0.2 MG/DL (ref 0–0.5)
BILIRUBIN DIRECT+TOT PNL SERPL-MCNC: 0.2 MG/DL (ref 0–0.5)
BILIRUBIN DIRECT+TOT PNL SERPL-MCNC: 0.2 MG/DL (ref 0–0.8)
BILIRUBIN DIRECT+TOT PNL SERPL-MCNC: 0.2 MG/DL (ref 0–0.8)
BILIRUBIN DIRECT+TOT PNL SERPL-MCNC: 0.3 MG/DL (ref 0–0.5)
BILIRUBIN DIRECT+TOT PNL SERPL-MCNC: 0.4 MG/DL (ref 0–0.8)
BUN SERPL-MCNC: 13 MG/DL (ref 8.4–25.7)
BUN SERPL-MCNC: 23 MG/DL (ref 8.4–25.7)
BUN SERPL-MCNC: 7 MG/DL (ref 8.4–25.7)
BUN SERPL-MCNC: 8 MG/DL (ref 8.4–25.7)
CALCIUM SERPL-MCNC: 8 MG/DL (ref 8.4–10.2)
CALCIUM SERPL-MCNC: 8.2 MG/DL (ref 8.4–10.2)
CALCIUM SERPL-MCNC: 8.3 MG/DL (ref 8.4–10.2)
CALCIUM SERPL-MCNC: 8.7 MG/DL (ref 8.4–10.2)
CHLORIDE SERPL-SCNC: 106 MMOL/L (ref 98–107)
CHLORIDE SERPL-SCNC: 96 MMOL/L (ref 98–107)
CHLORIDE SERPL-SCNC: 97 MMOL/L (ref 98–107)
CHLORIDE SERPL-SCNC: 98 MMOL/L (ref 98–107)
CO2 SERPL-SCNC: 22 MMOL/L (ref 22–29)
CO2 SERPL-SCNC: 26 MMOL/L (ref 22–29)
CO2 SERPL-SCNC: 27 MMOL/L (ref 22–29)
CO2 SERPL-SCNC: 28 MMOL/L (ref 22–29)
CREAT SERPL-MCNC: 0.73 MG/DL (ref 0.73–1.18)
CREAT SERPL-MCNC: 0.76 MG/DL (ref 0.73–1.18)
CREAT SERPL-MCNC: 0.81 MG/DL (ref 0.73–1.18)
CREAT SERPL-MCNC: 1 MG/DL (ref 0.73–1.18)
CREAT/UREA NIT SERPL: 11
DACRYOCYTES BLD QL SMEAR: ABNORMAL
EOSINOPHIL NFR BLD MANUAL: 0 %
EOSINOPHIL NFR BLD MANUAL: 1 %
EOSINOPHIL NFR BLD MANUAL: 1 %
EOSINOPHIL NFR BLD MANUAL: 2 %
EOSINOPHIL NFR BLD MANUAL: 2 %
ERYTHROCYTE [DISTWIDTH] IN BLOOD BY AUTOMATED COUNT: 17.2 % (ref 11.5–14.5)
ERYTHROCYTE [DISTWIDTH] IN BLOOD BY AUTOMATED COUNT: 18.2 % (ref 11.5–14.5)
ERYTHROCYTE [DISTWIDTH] IN BLOOD BY AUTOMATED COUNT: 18.6 % (ref 11.5–14.5)
ERYTHROCYTE [DISTWIDTH] IN BLOOD BY AUTOMATED COUNT: 19.2 % (ref 11.5–14.5)
ERYTHROCYTE [DISTWIDTH] IN BLOOD BY AUTOMATED COUNT: 19.4 % (ref 11.5–14.5)
ERYTHROCYTE [DISTWIDTH] IN BLOOD BY AUTOMATED COUNT: 19.5 % (ref 11.5–14.5)
ERYTHROCYTE [DISTWIDTH] IN BLOOD BY AUTOMATED COUNT: 20.4 % (ref 11.5–14.5)
FERRITIN SERPL-MCNC: 294.35 NG/ML (ref 21.81–274.66)
FERRITIN SERPL-MCNC: 500.44 NG/ML (ref 21.81–274.66)
FERRITIN SERPL-MCNC: 883.64 NG/ML (ref 21.81–274.66)
FOLATE SERPL-MCNC: 15.9 NG/ML (ref 7–31.4)
GLOBULIN SER-MCNC: 3.1 GM/DL (ref 2.4–3.5)
GLOBULIN SER-MCNC: 3.3 GM/DL (ref 2.4–3.5)
GLOBULIN SER-MCNC: 3.5 GM/DL (ref 2.4–3.5)
GLUCOSE SERPL-MCNC: 103 MG/DL (ref 74–100)
GLUCOSE SERPL-MCNC: 127 MG/DL (ref 74–100)
GLUCOSE SERPL-MCNC: 77 MG/DL (ref 74–100)
GLUCOSE SERPL-MCNC: 93 MG/DL (ref 74–100)
GRANULOCYTES NFR BLD MANUAL: 30 % (ref 43–75)
GRANULOCYTES NFR BLD MANUAL: 40 % (ref 43–75)
GRANULOCYTES NFR BLD MANUAL: 46 % (ref 43–75)
GRANULOCYTES NFR BLD MANUAL: 47 % (ref 43–75)
GRANULOCYTES NFR BLD MANUAL: 48 % (ref 43–75)
GRANULOCYTES NFR BLD MANUAL: 59 % (ref 43–75)
GRANULOCYTES NFR BLD MANUAL: 62 % (ref 43–75)
HAPTOGLOB SERPL-MCNC: 251 MG/DL (ref 14–258)
HCT VFR BLD AUTO: 22.2 % (ref 40–51)
HCT VFR BLD AUTO: 27 % (ref 40–51)
HCT VFR BLD AUTO: 28.1 % (ref 40–51)
HCT VFR BLD AUTO: 28.4 % (ref 40–51)
HCT VFR BLD AUTO: 28.8 % (ref 40–51)
HCT VFR BLD AUTO: 29.3 % (ref 40–51)
HCT VFR BLD AUTO: 29.4 % (ref 40–51)
HGB BLD-MCNC: 6.8 GM/DL (ref 13.5–17.5)
HGB BLD-MCNC: 8.3 GM/DL (ref 13.5–17.5)
HGB BLD-MCNC: 8.6 GM/DL (ref 13.5–17.5)
HGB BLD-MCNC: 8.7 GM/DL (ref 13.5–17.5)
HGB BLD-MCNC: 8.8 GM/DL (ref 13.5–17.5)
HGB BLD-MCNC: 8.9 GM/DL (ref 13.5–17.5)
HGB BLD-MCNC: 9.2 GM/DL (ref 13.5–17.5)
HYPOCHROMIA BLD QL SMEAR: ABNORMAL
IRON SATN MFR SERPL: 4 % (ref 20–50)
IRON SATN MFR SERPL: 42 % (ref 20–50)
IRON SATN MFR SERPL: 43 % (ref 20–50)
IRON SERPL-MCNC: 8 UG/DL (ref 65–175)
IRON SERPL-MCNC: 98 UG/DL (ref 65–175)
IRON SERPL-MCNC: 99 UG/DL (ref 65–175)
LDH SERPL-CCNC: 643 UNIT/L (ref 140–271)
LYMPHOCYTES NFR BLD MANUAL: 1 %
LYMPHOCYTES NFR BLD MANUAL: 17 % (ref 20.5–51.1)
LYMPHOCYTES NFR BLD MANUAL: 2 %
LYMPHOCYTES NFR BLD MANUAL: 22 % (ref 20.5–51.1)
LYMPHOCYTES NFR BLD MANUAL: 26 % (ref 20.5–51.1)
LYMPHOCYTES NFR BLD MANUAL: 26 % (ref 20.5–51.1)
LYMPHOCYTES NFR BLD MANUAL: 29 % (ref 20.5–51.1)
LYMPHOCYTES NFR BLD MANUAL: 37 % (ref 20.5–51.1)
LYMPHOCYTES NFR BLD MANUAL: 5 %
LYMPHOCYTES NFR BLD MANUAL: 6 % (ref 20.5–51.1)
LYMPHOCYTES NFR BLD MANUAL: 7 %
MACROCYTES BLD QL SMEAR: ABNORMAL
MACROCYTES BLD QL SMEAR: ABNORMAL
MAGNESIUM SERPL-MCNC: 1.41 MG/DL (ref 1.6–2.6)
MAGNESIUM SERPL-MCNC: 1.43 MG/DL (ref 1.6–2.6)
MAGNESIUM SERPL-MCNC: 1.46 MG/DL (ref 1.6–2.6)
MAGNESIUM SERPL-MCNC: 1.54 MG/DL (ref 1.6–2.6)
MAGNESIUM SERPL-MCNC: 1.89 MG/DL (ref 1.6–2.6)
MCH RBC QN AUTO: 26.6 PG (ref 26–34)
MCH RBC QN AUTO: 26.7 PG (ref 26–34)
MCH RBC QN AUTO: 27 PG (ref 26–34)
MCH RBC QN AUTO: 27.4 PG (ref 26–34)
MCH RBC QN AUTO: 27.5 PG (ref 26–34)
MCH RBC QN AUTO: 27.7 PG (ref 26–34)
MCH RBC QN AUTO: 27.8 PG (ref 26–34)
MCHC RBC AUTO-ENTMCNC: 30.3 GM/DL (ref 31–37)
MCHC RBC AUTO-ENTMCNC: 30.3 GM/DL (ref 31–37)
MCHC RBC AUTO-ENTMCNC: 30.6 GM/DL (ref 31–37)
MCHC RBC AUTO-ENTMCNC: 30.6 GM/DL (ref 31–37)
MCHC RBC AUTO-ENTMCNC: 30.7 GM/DL (ref 31–37)
MCHC RBC AUTO-ENTMCNC: 31 GM/DL (ref 31–37)
MCHC RBC AUTO-ENTMCNC: 31.4 GM/DL (ref 31–37)
MCV RBC AUTO: 85.2 FL (ref 80–100)
MCV RBC AUTO: 88 FL (ref 80–100)
MCV RBC AUTO: 88.3 FL (ref 80–100)
MCV RBC AUTO: 88.4 FL (ref 80–100)
MCV RBC AUTO: 89.9 FL (ref 80–100)
MCV RBC AUTO: 90.3 FL (ref 80–100)
MCV RBC AUTO: 91.6 FL (ref 80–100)
METAMYELOCYTES NFR BLD MANUAL: 1 %
METAMYELOCYTES NFR BLD MANUAL: 1 %
METAMYELOCYTES NFR BLD MANUAL: 3 %
METAMYELOCYTES NFR BLD MANUAL: 5 %
METAMYELOCYTES NFR BLD MANUAL: 7 %
MICROCYTES BLD QL SMEAR: ABNORMAL
MONOCYTES NFR BLD MANUAL: 10 % (ref 2–9)
MONOCYTES NFR BLD MANUAL: 11 % (ref 2–9)
MONOCYTES NFR BLD MANUAL: 12 % (ref 2–9)
MONOCYTES NFR BLD MANUAL: 16 % (ref 2–9)
MONOCYTES NFR BLD MANUAL: 18 % (ref 2–9)
MONOCYTES NFR BLD MANUAL: 19 % (ref 2–9)
MONOCYTES NFR BLD MANUAL: 22 % (ref 2–9)
MYELOCYTES NFR BLD MANUAL: 1 %
MYELOCYTES NFR BLD MANUAL: 2 %
MYELOCYTES NFR BLD MANUAL: 4 %
MYELOCYTES NFR BLD MANUAL: 4 %
NEUTS BAND NFR BLD MANUAL: 1 % (ref 0–10)
NEUTS BAND NFR BLD MANUAL: 1 % (ref 0–10)
NEUTS BAND NFR BLD MANUAL: 10 % (ref 0–10)
NEUTS BAND NFR BLD MANUAL: 17 % (ref 0–10)
NEUTS BAND NFR BLD MANUAL: 5 % (ref 0–10)
NEUTS BAND NFR BLD MANUAL: 9 % (ref 0–10)
NRBC BLD MANUAL-RTO: 1 %
NRBC BLD MANUAL-RTO: 13 %
NRBC BLD MANUAL-RTO: 3 %
NRBC BLD MANUAL-RTO: 5 %
NRBC BLD MANUAL-RTO: 7 %
NRBC BLD MANUAL-RTO: 7 %
NRBC BLD MANUAL-RTO: 8 %
OVALOCYTES BLD QL SMEAR: ABNORMAL
OVALOCYTES BLD QL SMEAR: ABNORMAL
PLATELET # BLD AUTO: 198 X10(3)/MCL (ref 130–400)
PLATELET # BLD AUTO: 200 X10(3)/MCL (ref 130–400)
PLATELET # BLD AUTO: 217 X10(3)/MCL (ref 130–400)
PLATELET # BLD AUTO: 224 X10(3)/MCL (ref 130–400)
PLATELET # BLD AUTO: 231 X10(3)/MCL (ref 130–400)
PLATELET # BLD AUTO: 252 X10(3)/MCL (ref 130–400)
PLATELET # BLD AUTO: 298 X10(3)/MCL (ref 130–400)
PLATELET # BLD EST: ADEQUATE 10*3/UL
PLATELET # BLD EST: NORMAL 10*3/UL
PLATELET # BLD EST: NORMAL 10*3/UL
PMV BLD AUTO: 10.3 FL (ref 7.4–10.4)
PMV BLD AUTO: 10.3 FL (ref 7.4–10.4)
PMV BLD AUTO: 10.7 FL (ref 7.4–10.4)
PMV BLD AUTO: 10.8 FL (ref 7.4–10.4)
PMV BLD AUTO: 9 FL (ref 7.4–10.4)
PMV BLD AUTO: 9 FL (ref 7.4–10.4)
PMV BLD AUTO: 9.8 FL (ref 7.4–10.4)
POIKILOCYTOSIS BLD QL SMEAR: ABNORMAL
POLYCHROMASIA BLD QL SMEAR: ABNORMAL
POTASSIUM SERPL-SCNC: 3.9 MMOL/L (ref 3.5–5.1)
POTASSIUM SERPL-SCNC: 4.3 MMOL/L (ref 3.5–5.1)
POTASSIUM SERPL-SCNC: 4.6 MMOL/L (ref 3.5–5.1)
POTASSIUM SERPL-SCNC: 4.8 MMOL/L (ref 3.5–5.1)
PROT SERPL-MCNC: 5.7 GM/DL
PROT SERPL-MCNC: 6.4 GM/DL (ref 6.4–8.3)
PROT SERPL-MCNC: 6.7 GM/DL (ref 6.4–8.3)
PROT SERPL-MCNC: 6.7 GM/DL (ref 6.4–8.3)
RBC # BLD AUTO: 2.47 X10(6)/MCL (ref 4.5–5.9)
RBC # BLD AUTO: 2.99 X10(6)/MCL (ref 4.5–5.9)
RBC # BLD AUTO: 3.1 X10(6)/MCL (ref 4.5–5.9)
RBC # BLD AUTO: 3.18 X10(6)/MCL (ref 4.5–5.9)
RBC # BLD AUTO: 3.26 X10(6)/MCL (ref 4.5–5.9)
RBC # BLD AUTO: 3.34 X10(6)/MCL (ref 4.5–5.9)
RBC # BLD AUTO: 3.44 X10(6)/MCL (ref 4.5–5.9)
RBC MORPH BLD: ABNORMAL
RET# (OHS): 0.07
RETICULOCYTE COUNT AUTOMATED (OLG): 3 % (ref 0.5–1.5)
SCHISTOCYTES BLD QL AUTO: ABNORMAL
SODIUM SERPL-SCNC: 133 MMOL/L (ref 136–145)
SODIUM SERPL-SCNC: 133 MMOL/L (ref 136–145)
SODIUM SERPL-SCNC: 136 MMOL/L (ref 136–145)
SODIUM SERPL-SCNC: 137 MMOL/L (ref 136–145)
TIBC SERPL-MCNC: 131 UG/DL (ref 69–240)
TIBC SERPL-MCNC: 133 UG/DL (ref 69–240)
TIBC SERPL-MCNC: 184 UG/DL (ref 69–240)
TIBC SERPL-MCNC: 192 UG/DL (ref 250–450)
TIBC SERPL-MCNC: 230 UG/DL (ref 250–450)
TIBC SERPL-MCNC: 231 UG/DL (ref 250–450)
TRANSFERRIN SERPL-MCNC: 166 MG/DL (ref 174–364)
TRANSFERRIN SERPL-MCNC: 198 MG/DL (ref 174–364)
TRANSFERRIN SERPL-MCNC: 199 MG/DL (ref 174–364)
VIT B12 SERPL-MCNC: 1773 PG/ML (ref 213–816)
WBC # SPEC AUTO: 2.3 X10(3)/MCL (ref 4.5–11)
WBC # SPEC AUTO: 2.4 X10(3)/MCL (ref 4.5–11)
WBC # SPEC AUTO: 2.5 X10(3)/MCL (ref 4.5–11)
WBC # SPEC AUTO: 2.6 X10(3)/MCL (ref 4.5–11)
WBC # SPEC AUTO: 2.8 X10(3)/MCL (ref 4.5–11)
WBC # SPEC AUTO: 2.8 X10(3)/MCL (ref 4.5–11)
WBC # SPEC AUTO: 7.5 X10(3)/MCL (ref 4.5–11)

## 2020-02-06 ENCOUNTER — HISTORICAL (OUTPATIENT)
Dept: INTERNAL MEDICINE | Facility: CLINIC | Age: 60
End: 2020-02-06

## 2020-02-06 LAB
ABS NEUT (OLG): 0.77 X10(3)/MCL (ref 2.1–9.2)
ALBUMIN SERPL-MCNC: 3.8 GM/DL (ref 3.4–5)
ALBUMIN/GLOB SERPL: 1.2 RATIO (ref 1.1–2)
ALP SERPL-CCNC: 113 UNIT/L (ref 45–117)
ALT SERPL-CCNC: 19 UNIT/L (ref 12–78)
ANISOCYTOSIS BLD QL SMEAR: ABNORMAL
AST SERPL-CCNC: 20 UNIT/L (ref 15–37)
BASOPHILS # BLD AUTO: 0 X10(3)/MCL (ref 0–0.2)
BASOPHILS NFR BLD AUTO: 2 %
BASOPHILS NFR BLD MANUAL: 0 %
BILIRUB SERPL-MCNC: 0.6 MG/DL (ref 0.2–1)
BILIRUBIN DIRECT+TOT PNL SERPL-MCNC: 0.2 MG/DL (ref 0–0.2)
BILIRUBIN DIRECT+TOT PNL SERPL-MCNC: 0.4 MG/DL
BUN SERPL-MCNC: 9 MG/DL (ref 7–18)
CALCIUM SERPL-MCNC: 8.1 MG/DL (ref 8.5–10.1)
CHLORIDE SERPL-SCNC: 99 MMOL/L (ref 98–107)
CHOLEST SERPL-MCNC: 146 MG/DL
CHOLEST/HDLC SERPL: 1.7 {RATIO} (ref 0–5)
CO2 SERPL-SCNC: 31 MMOL/L (ref 21–32)
CREAT SERPL-MCNC: 0.8 MG/DL (ref 0.6–1.3)
DACRYOCYTES BLD QL SMEAR: ABNORMAL
EOSINOPHIL NFR BLD MANUAL: 0 %
ERYTHROCYTE [DISTWIDTH] IN BLOOD BY AUTOMATED COUNT: 19.2 % (ref 11.5–14.5)
EST. AVERAGE GLUCOSE BLD GHB EST-MCNC: 94 MG/DL
GLOBULIN SER-MCNC: 3.3 GM/ML (ref 2.3–3.5)
GLUCOSE SERPL-MCNC: 94 MG/DL (ref 74–106)
GRANULOCYTES NFR BLD MANUAL: 40 % (ref 43–75)
HBA1C MFR BLD: 4.9 % (ref 4.2–6.3)
HCT VFR BLD AUTO: 33.4 % (ref 40–51)
HDLC SERPL-MCNC: 87 MG/DL (ref 40–59)
HGB BLD-MCNC: 10.9 GM/DL (ref 13.5–17.5)
HYPOCHROMIA BLD QL SMEAR: ABNORMAL
IMM GRANULOCYTES # BLD AUTO: 0.28 10*3/UL
IMM GRANULOCYTES NFR BLD AUTO: 12 %
LDLC SERPL CALC-MCNC: 47 MG/DL
LYMPHOCYTES # BLD AUTO: 0.8 X10(3)/MCL (ref 0.6–4.6)
LYMPHOCYTES NFR BLD AUTO: 33 %
LYMPHOCYTES NFR BLD MANUAL: 10 %
LYMPHOCYTES NFR BLD MANUAL: 25 % (ref 20.5–51.1)
MCH RBC QN AUTO: 29.5 PG (ref 26–34)
MCHC RBC AUTO-ENTMCNC: 32.6 GM/DL (ref 31–37)
MCV RBC AUTO: 90.3 FL (ref 80–100)
METAMYELOCYTES NFR BLD MANUAL: 1 %
MONOCYTES # BLD AUTO: 0.4 X10(3)/MCL (ref 0.1–1.3)
MONOCYTES NFR BLD AUTO: 20 %
MONOCYTES NFR BLD MANUAL: 19 % (ref 2–9)
MYELOCYTES NFR BLD MANUAL: 1 %
NEUTROPHILS # BLD AUTO: 0.77 X10(3)/MCL (ref 2.1–9.2)
NEUTROPHILS NFR BLD AUTO: 34 %
NEUTS BAND NFR BLD MANUAL: 4 % (ref 0–10)
NRBC BLD MANUAL-RTO: 8 %
PLATELET # BLD AUTO: 139 X10(3)/MCL (ref 130–400)
PLATELET # BLD EST: ADEQUATE 10*3/UL
PMV BLD AUTO: 9.3 FL (ref 7.4–10.4)
POIKILOCYTOSIS BLD QL SMEAR: ABNORMAL
POLYCHROMASIA BLD QL SMEAR: ABNORMAL
POTASSIUM SERPL-SCNC: 5 MMOL/L (ref 3.5–5.1)
PROT SERPL-MCNC: 7.1 GM/DL (ref 6.4–8.2)
RBC # BLD AUTO: 3.7 X10(6)/MCL (ref 4.5–5.9)
RBC MORPH BLD: ABNORMAL
SODIUM SERPL-SCNC: 132 MMOL/L (ref 136–145)
T3FREE SERPL-MCNC: 2.29 PG/ML (ref 2.18–3.98)
T4 FREE SERPL-MCNC: 0.91 NG/DL (ref 0.76–1.46)
TRIGL SERPL-MCNC: 61 MG/DL
TSH SERPL-ACNC: 2.16 MIU/L (ref 0.36–3.74)
VLDLC SERPL CALC-MCNC: 12 MG/DL
WBC # SPEC AUTO: 2.3 X10(3)/MCL (ref 4.5–11)

## 2020-03-11 ENCOUNTER — HISTORICAL (OUTPATIENT)
Dept: RADIOLOGY | Facility: HOSPITAL | Age: 60
End: 2020-03-11

## 2020-03-19 ENCOUNTER — HOSPITAL ENCOUNTER (OUTPATIENT)
Dept: INTENSIVE CARE | Facility: HOSPITAL | Age: 60
End: 2020-03-21
Attending: INTERNAL MEDICINE | Admitting: INTERNAL MEDICINE

## 2020-03-19 LAB
ABS NEUT (OLG): 1.11 X10(3)/MCL (ref 2.1–9.2)
ALBUMIN SERPL-MCNC: 3.3 GM/DL (ref 3.4–5)
ALBUMIN/GLOB SERPL: 1 {RATIO}
ALP SERPL-CCNC: 114 UNIT/L (ref 50–136)
ALT SERPL-CCNC: 22 UNIT/L (ref 12–78)
ANISOCYTOSIS BLD QL SMEAR: 1
AST SERPL-CCNC: 39 UNIT/L (ref 15–37)
BASOPHILS NFR BLD MANUAL: 1 % (ref 0–2)
BILIRUB SERPL-MCNC: 0.2 MG/DL (ref 0.2–1)
BILIRUBIN DIRECT+TOT PNL SERPL-MCNC: 0.1 MG/DL (ref 0–0.2)
BILIRUBIN DIRECT+TOT PNL SERPL-MCNC: 0.1 MG/DL (ref 0–0.8)
BNP BLD-MCNC: 267 PG/ML (ref 0–100)
BUN SERPL-MCNC: 14 MG/DL (ref 7–18)
CALCIUM SERPL-MCNC: 7.9 MG/DL (ref 8.5–10.1)
CHLORIDE SERPL-SCNC: 104 MMOL/L (ref 98–107)
CO2 SERPL-SCNC: 23 MMOL/L (ref 21–32)
CREAT SERPL-MCNC: 0.89 MG/DL (ref 0.7–1.3)
DACRYOCYTES BLD QL SMEAR: 1 /MCL (ref 0–1)
ERYTHROCYTE [DISTWIDTH] IN BLOOD BY AUTOMATED COUNT: 19.4 % (ref 11.5–17)
ETHANOL SERPL-MCNC: <3 MG/DL
FERRITIN SERPL-MCNC: 359.5 NG/ML (ref 8–388)
FLUAV AG UPPER RESP QL IA.RAPID: NOT DETECTED
FLUBV AG UPPER RESP QL IA.RAPID: NOT DETECTED
FOLATE SERPL-MCNC: 70 NG/ML (ref 3.1–17.5)
GLOBULIN SER-MCNC: 3.2 GM/DL (ref 2.4–3.5)
GLUCOSE SERPL-MCNC: 160 MG/DL (ref 74–106)
HCT VFR BLD AUTO: 30.3 % (ref 42–52)
HGB BLD-MCNC: 9.1 GM/DL (ref 14–18)
IRON SATN MFR SERPL: 22.6 % (ref 20–50)
IRON SERPL-MCNC: 62 MCG/DL (ref 50–175)
LYMPHOCYTES NFR BLD MANUAL: 20 % (ref 13–40)
MACROCYTES BLD QL SMEAR: 1 /MCL
MAGNESIUM SERPL-MCNC: 1.6 MG/DL (ref 1.8–2.4)
MCH RBC QN AUTO: 29 PG (ref 27–31)
MCHC RBC AUTO-ENTMCNC: 30 GM/DL (ref 33–36)
MCV RBC AUTO: 96.5 FL (ref 80–94)
METAMYELOCYTES NFR BLD MANUAL: 4 %
MONOCYTES NFR BLD MANUAL: 8 % (ref 2–11)
NEUTROPHILS NFR BLD MANUAL: 68 % (ref 47–80)
NRBC BLD MANUAL-RTO: 12 %
OVALOCYTES BLD QL SMEAR: 1 (ref 0–0)
PHOSPHATE SERPL-MCNC: 3.8 MG/DL (ref 2.5–4.9)
PLATELET # BLD AUTO: 123 X10(3)/MCL (ref 130–400)
PLATELET # BLD EST: ABNORMAL 10*3/UL
PMV BLD AUTO: 10.3 FL (ref 7.4–10.4)
POC TROPONIN: 0 NG/ML (ref 0–0.08)
POIKILOCYTOSIS BLD QL SMEAR: 1
POLYCHROMASIA BLD QL SMEAR: 1
POTASSIUM SERPL-SCNC: 4.9 MMOL/L (ref 3.5–5.1)
PROT SERPL-MCNC: 6.5 GM/DL (ref 6.4–8.2)
RBC # BLD AUTO: 3.14 X10(6)/MCL (ref 4.7–6.1)
RBC MORPH BLD: ABNORMAL
SODIUM SERPL-SCNC: 134 MMOL/L (ref 136–145)
TIBC SERPL-MCNC: 274 MCG/DL (ref 250–450)
TRANSFERRIN SERPL-MCNC: 206 MG/DL (ref 200–360)
VIT B12 SERPL-MCNC: 538 PG/ML (ref 193–986)
WBC # SPEC AUTO: 2.3 X10(3)/MCL (ref 4.5–11.5)

## 2020-03-20 LAB
ABS NEUT (OLG): 0.92 X10(3)/MCL (ref 2.1–9.2)
BASOPHILS # BLD AUTO: 0 X10(3)/MCL (ref 0–0.2)
BASOPHILS NFR BLD AUTO: 1 %
BUN SERPL-MCNC: 7 MG/DL (ref 7–18)
CALCIUM SERPL-MCNC: 8.5 MG/DL (ref 8.5–10.1)
CHLORIDE SERPL-SCNC: 104 MMOL/L (ref 98–107)
CO2 SERPL-SCNC: 27 MMOL/L (ref 21–32)
CREAT SERPL-MCNC: 0.61 MG/DL (ref 0.7–1.3)
CREAT/UREA NIT SERPL: 11.5
ERYTHROCYTE [DISTWIDTH] IN BLOOD BY AUTOMATED COUNT: 19.3 % (ref 11.5–17)
GLUCOSE SERPL-MCNC: 96 MG/DL (ref 74–106)
HCT VFR BLD AUTO: 27.9 % (ref 42–52)
HGB BLD-MCNC: 8.5 GM/DL (ref 14–18)
LYMPHOCYTES # BLD AUTO: 0.5 X10(3)/MCL (ref 0.6–4.6)
LYMPHOCYTES NFR BLD AUTO: 24 %
MCH RBC QN AUTO: 29.2 PG (ref 27–31)
MCHC RBC AUTO-ENTMCNC: 30.5 GM/DL (ref 33–36)
MCV RBC AUTO: 95.9 FL (ref 80–94)
MONOCYTES # BLD AUTO: 0.5 X10(3)/MCL (ref 0.1–1.3)
MONOCYTES NFR BLD AUTO: 23 %
NEUTROPHILS # BLD AUTO: 0.92 X10(3)/MCL (ref 2.1–9.2)
NEUTROPHILS NFR BLD AUTO: 41 %
PLATELET # BLD AUTO: 110 X10(3)/MCL (ref 130–400)
PMV BLD AUTO: 9.2 FL (ref 9.4–12.4)
POTASSIUM SERPL-SCNC: 4.8 MMOL/L (ref 3.5–5.1)
RBC # BLD AUTO: 2.91 X10(6)/MCL (ref 4.7–6.1)
SODIUM SERPL-SCNC: 137 MMOL/L (ref 136–145)
WBC # SPEC AUTO: 2.3 X10(3)/MCL (ref 4.5–11.5)

## 2020-03-21 LAB
ABS NEUT (OLG): 1.03 X10(3)/MCL (ref 2.1–9.2)
ALBUMIN SERPL-MCNC: 3.3 GM/DL (ref 3.4–5)
ALBUMIN/GLOB SERPL: 1.2 RATIO (ref 1.1–2)
ALP SERPL-CCNC: 94 UNIT/L (ref 50–136)
ALT SERPL-CCNC: 17 UNIT/L (ref 12–78)
AST SERPL-CCNC: 26 UNIT/L (ref 15–37)
BASOPHILS # BLD AUTO: 0 X10(3)/MCL (ref 0–0.2)
BASOPHILS NFR BLD AUTO: 1 %
BILIRUB SERPL-MCNC: 0.3 MG/DL (ref 0.2–1)
BILIRUBIN DIRECT+TOT PNL SERPL-MCNC: 0.1 MG/DL (ref 0–0.5)
BILIRUBIN DIRECT+TOT PNL SERPL-MCNC: 0.2 MG/DL (ref 0–0.8)
BUN SERPL-MCNC: 10 MG/DL (ref 7–18)
CALCIUM SERPL-MCNC: 8.4 MG/DL (ref 8.5–10.1)
CHLORIDE SERPL-SCNC: 101 MMOL/L (ref 98–107)
CO2 SERPL-SCNC: 28 MMOL/L (ref 21–32)
COLOR STL: NORMAL
CONSISTENCY STL: NORMAL
CREAT SERPL-MCNC: 0.56 MG/DL (ref 0.7–1.3)
EOSINOPHIL # BLD AUTO: 0 X10(3)/MCL (ref 0–0.9)
EOSINOPHIL NFR BLD AUTO: 0 %
ERYTHROCYTE [DISTWIDTH] IN BLOOD BY AUTOMATED COUNT: 19.2 % (ref 11.5–17)
GLOBULIN SER-MCNC: 2.7 GM/DL (ref 2.4–3.5)
GLUCOSE SERPL-MCNC: 95 MG/DL (ref 74–106)
HCT VFR BLD AUTO: 29.6 % (ref 42–52)
HEMOCCULT SP1 STL QL: NEGATIVE
HEMOCCULT SP2 STL QL: NEGATIVE
HGB BLD-MCNC: 8.6 GM/DL (ref 14–18)
IMM GRANULOCYTES # BLD AUTO: 0 10*3/UL
IMM GRANULOCYTES NFR BLD AUTO: 9 %
LYMPHOCYTES # BLD AUTO: 0.6 X10(3)/MCL (ref 0.6–4.6)
LYMPHOCYTES NFR BLD AUTO: 26 %
MCH RBC QN AUTO: 29.2 PG (ref 27–31)
MCHC RBC AUTO-ENTMCNC: 29.1 GM/DL (ref 33–36)
MCV RBC AUTO: 100.3 FL (ref 80–94)
MONOCYTES # BLD AUTO: 0.5 X10(3)/MCL (ref 0.1–1.3)
MONOCYTES NFR BLD AUTO: 20 %
NEUTROPHILS # BLD AUTO: 1.03 X10(3)/MCL (ref 2.1–9.2)
NEUTROPHILS NFR BLD AUTO: 44 %
PLATELET # BLD AUTO: 132 X10(3)/MCL (ref 130–400)
PMV BLD AUTO: 11.5 FL (ref 9.4–12.4)
POTASSIUM SERPL-SCNC: 4.8 MMOL/L (ref 3.5–5.1)
PROT SERPL-MCNC: 6 GM/DL (ref 6.4–8.2)
RBC # BLD AUTO: 2.95 X10(6)/MCL (ref 4.7–6.1)
SODIUM SERPL-SCNC: 135 MMOL/L (ref 136–145)
WBC # SPEC AUTO: 2.4 X10(3)/MCL (ref 4.5–11.5)

## 2021-01-01 ENCOUNTER — HISTORICAL (OUTPATIENT)
Dept: INFUSION THERAPY | Facility: HOSPITAL | Age: 61
End: 2021-01-01

## 2021-01-01 ENCOUNTER — HOSPITAL ENCOUNTER (INPATIENT)
Facility: HOSPITAL | Age: 61
LOS: 32 days | DRG: 207 | End: 2021-09-09
Attending: EMERGENCY MEDICINE | Admitting: EMERGENCY MEDICINE
Payer: MEDICARE

## 2021-01-01 ENCOUNTER — ANESTHESIA (OUTPATIENT)
Dept: MEDSURG UNIT | Facility: HOSPITAL | Age: 61
DRG: 207 | End: 2021-01-01
Payer: MEDICARE

## 2021-01-01 ENCOUNTER — TELEPHONE (OUTPATIENT)
Dept: HEMATOLOGY/ONCOLOGY | Facility: CLINIC | Age: 61
End: 2021-01-01

## 2021-01-01 ENCOUNTER — OFFICE VISIT (OUTPATIENT)
Dept: HEMATOLOGY/ONCOLOGY | Facility: CLINIC | Age: 61
End: 2021-01-01
Payer: MEDICARE

## 2021-01-01 ENCOUNTER — HISTORICAL (OUTPATIENT)
Dept: ADMINISTRATIVE | Facility: HOSPITAL | Age: 61
End: 2021-01-01

## 2021-01-01 ENCOUNTER — HISTORICAL (OUTPATIENT)
Dept: LAB | Facility: HOSPITAL | Age: 61
End: 2021-01-01

## 2021-01-01 ENCOUNTER — SPECIALTY PHARMACY (OUTPATIENT)
Dept: PHARMACY | Facility: CLINIC | Age: 61
End: 2021-01-01

## 2021-01-01 ENCOUNTER — ANESTHESIA EVENT (OUTPATIENT)
Dept: MEDSURG UNIT | Facility: HOSPITAL | Age: 61
DRG: 207 | End: 2021-01-01
Payer: MEDICARE

## 2021-01-01 VITALS
WEIGHT: 298.75 LBS | DIASTOLIC BLOOD PRESSURE: 66 MMHG | OXYGEN SATURATION: 92 % | SYSTOLIC BLOOD PRESSURE: 113 MMHG | HEIGHT: 69 IN | TEMPERATURE: 100 F | HEART RATE: 134 BPM | BODY MASS INDEX: 44.25 KG/M2 | RESPIRATION RATE: 33 BRPM

## 2021-01-01 DIAGNOSIS — J96.02 ACUTE HYPERCAPNIC RESPIRATORY FAILURE: ICD-10-CM

## 2021-01-01 DIAGNOSIS — G93.40 ACUTE ENCEPHALOPATHY: ICD-10-CM

## 2021-01-01 DIAGNOSIS — Z99.11 ENCOUNTER FOR WEANING FROM VENTILATOR: ICD-10-CM

## 2021-01-01 DIAGNOSIS — I50.9 CONGESTIVE HEART FAILURE: ICD-10-CM

## 2021-01-01 DIAGNOSIS — D68.9 COAGULATION DISORDER: ICD-10-CM

## 2021-01-01 DIAGNOSIS — E87.5 HYPERKALEMIA: ICD-10-CM

## 2021-01-01 DIAGNOSIS — I10 HYPERTENSION, UNSPECIFIED TYPE: ICD-10-CM

## 2021-01-01 DIAGNOSIS — I50.9 CONGESTIVE HEART FAILURE, UNSPECIFIED HF CHRONICITY, UNSPECIFIED HEART FAILURE TYPE: ICD-10-CM

## 2021-01-01 DIAGNOSIS — D64.9 ANEMIA, UNSPECIFIED TYPE: ICD-10-CM

## 2021-01-01 DIAGNOSIS — J80 ARDS (ADULT RESPIRATORY DISTRESS SYNDROME): ICD-10-CM

## 2021-01-01 DIAGNOSIS — I46.9 CARDIAC ARREST: ICD-10-CM

## 2021-01-01 DIAGNOSIS — C92.00 ACUTE MYELOID LEUKEMIA NOT HAVING ACHIEVED REMISSION: Primary | ICD-10-CM

## 2021-01-01 DIAGNOSIS — D63.0 ANEMIA IN NEOPLASTIC DISEASE: Primary | ICD-10-CM

## 2021-01-01 DIAGNOSIS — J96.01 ACUTE HYPOXEMIC RESPIRATORY FAILURE: ICD-10-CM

## 2021-01-01 DIAGNOSIS — Z51.5 PALLIATIVE CARE ENCOUNTER: ICD-10-CM

## 2021-01-01 DIAGNOSIS — C92.00 ACUTE MYELOID LEUKEMIA NOT HAVING ACHIEVED REMISSION: ICD-10-CM

## 2021-01-01 DIAGNOSIS — D75.81 MYELOFIBROSIS: ICD-10-CM

## 2021-01-01 DIAGNOSIS — R00.2 PALPITATIONS: ICD-10-CM

## 2021-01-01 DIAGNOSIS — U07.1 PNEUMONIA DUE TO COVID-19 VIRUS: ICD-10-CM

## 2021-01-01 DIAGNOSIS — K72.90 LIVER FAILURE WITHOUT HEPATIC COMA, UNSPECIFIED CHRONICITY: ICD-10-CM

## 2021-01-01 DIAGNOSIS — D69.6 THROMBOCYTOPENIA: ICD-10-CM

## 2021-01-01 DIAGNOSIS — E66.01 MORBID OBESITY DUE TO EXCESS CALORIES: ICD-10-CM

## 2021-01-01 DIAGNOSIS — Z91.89 AT HIGH RISK FOR HEMODYNAMIC INSTABILITY: ICD-10-CM

## 2021-01-01 DIAGNOSIS — I50.9 CHF (CONGESTIVE HEART FAILURE): ICD-10-CM

## 2021-01-01 DIAGNOSIS — D72.829 LEUKOCYTOSIS, UNSPECIFIED TYPE: ICD-10-CM

## 2021-01-01 DIAGNOSIS — N17.9 AKI (ACUTE KIDNEY INJURY): ICD-10-CM

## 2021-01-01 DIAGNOSIS — J12.82 PNEUMONIA DUE TO COVID-19 VIRUS: ICD-10-CM

## 2021-01-01 DIAGNOSIS — E88.3 TUMOR LYSIS SYNDROME: ICD-10-CM

## 2021-01-01 DIAGNOSIS — E66.01 MORBID OBESITY WITH BMI OF 45.0-49.9, ADULT: ICD-10-CM

## 2021-01-01 DIAGNOSIS — I50.42 CHRONIC COMBINED SYSTOLIC AND DIASTOLIC HEART FAILURE: ICD-10-CM

## 2021-01-01 LAB
ABO + RH BLD: NORMAL
ABS NEUT (OLG): 0.9 X10(3)/MCL (ref 2.1–9.2)
ABS NEUT (OLG): 0.99 X10(3)/MCL (ref 2.1–9.2)
ABS NEUT (OLG): 1 X10(3)/MCL (ref 2.1–9.2)
ABS NEUT (OLG): 1.03 X10(3)/MCL (ref 2.1–9.2)
ABS NEUT (OLG): 1.03 X10(3)/MCL (ref 2.1–9.2)
ABS NEUT (OLG): 1.1 X10(3)/MCL (ref 2.1–9.2)
ABS NEUT (OLG): 1.12 X10(3)/MCL (ref 2.1–9.2)
ABS NEUT (OLG): 1.13 X10(3)/MCL (ref 2.1–9.2)
ABS NEUT (OLG): 1.14 X10(3)/MCL (ref 2.1–9.2)
ABS NEUT (OLG): 1.16 X10(3)/MCL (ref 2.1–9.2)
ABS NEUT (OLG): 1.21 X10(3)/MCL (ref 2.1–9.2)
ABS NEUT (OLG): 1.22 X10(3)/MCL (ref 2.1–9.2)
ABS NEUT (OLG): 1.24 X10(3)/MCL (ref 2.1–9.2)
ABS NEUT (OLG): 1.24 X10(3)/MCL (ref 2.1–9.2)
ABS NEUT (OLG): 1.25 X10(3)/MCL (ref 2.1–9.2)
ABS NEUT (OLG): 1.25 X10(3)/MCL (ref 2.1–9.2)
ABS NEUT (OLG): 1.26 X10(3)/MCL (ref 2.1–9.2)
ABS NEUT (OLG): 1.28 X10(3)/MCL (ref 2.1–9.2)
ABS NEUT (OLG): 1.5 X10(3)/MCL (ref 2.1–9.2)
ABS NEUT (OLG): 1.5 X10(3)/MCL (ref 2.1–9.2)
ABS NEUT (OLG): 1.58 X10(3)/MCL (ref 2.1–9.2)
ABS NEUT (OLG): 1.63 X10(3)/MCL (ref 2.1–9.2)
ABS NEUT (OLG): 1.71 X10(3)/MCL (ref 2.1–9.2)
ABS NEUT (OLG): 1.77 X10(3)/MCL (ref 2.1–9.2)
ABS NEUT (OLG): 2.17 X10(3)/MCL (ref 2.1–9.2)
ABS NEUT (OLG): 2.74 X10(3)/MCL (ref 2.1–9.2)
ABS NEUT (OLG): 3.13 X10(3)/MCL (ref 2.1–9.2)
ALBUMIN SERPL BCP-MCNC: 2.4 G/DL (ref 3.5–5.2)
ALBUMIN SERPL BCP-MCNC: 2.5 G/DL (ref 3.5–5.2)
ALBUMIN SERPL BCP-MCNC: 2.6 G/DL (ref 3.5–5.2)
ALBUMIN SERPL BCP-MCNC: 2.6 G/DL (ref 3.5–5.2)
ALBUMIN SERPL BCP-MCNC: 2.7 G/DL (ref 3.5–5.2)
ALBUMIN SERPL BCP-MCNC: 2.9 G/DL (ref 3.5–5.2)
ALBUMIN SERPL BCP-MCNC: 3 G/DL (ref 3.5–5.2)
ALBUMIN SERPL BCP-MCNC: 3.1 G/DL (ref 3.5–5.2)
ALBUMIN SERPL BCP-MCNC: 3.2 G/DL (ref 3.5–5.2)
ALBUMIN SERPL BCP-MCNC: 3.3 G/DL (ref 3.5–5.2)
ALBUMIN SERPL BCP-MCNC: 3.4 G/DL (ref 3.5–5.2)
ALBUMIN SERPL-MCNC: 3.2 GM/DL (ref 3.5–5)
ALBUMIN SERPL-MCNC: 3.3 GM/DL (ref 3.5–5)
ALBUMIN SERPL-MCNC: 3.4 GM/DL (ref 3.5–5)
ALBUMIN SERPL-MCNC: 3.5 GM/DL (ref 3.5–5)
ALBUMIN SERPL-MCNC: 3.6 GM/DL (ref 3.5–5)
ALBUMIN SERPL-MCNC: 3.7 GM/DL (ref 3.5–5)
ALBUMIN SERPL-MCNC: 3.8 GM/DL (ref 3.5–5)
ALBUMIN SERPL-MCNC: 3.9 GM/DL (ref 3.5–5)
ALBUMIN SERPL-MCNC: 3.9 GM/DL (ref 3.5–5)
ALBUMIN/GLOB SERPL: 1 RATIO (ref 1.1–2)
ALBUMIN/GLOB SERPL: 1 RATIO (ref 1.1–2)
ALBUMIN/GLOB SERPL: 1.1 RATIO (ref 1.1–2)
ALBUMIN/GLOB SERPL: 1.2 RATIO (ref 1.1–2)
ALBUMIN/GLOB SERPL: 1.3 RATIO (ref 1.1–2)
ALBUMIN/GLOB SERPL: 1.4 RATIO (ref 1.1–2)
ALBUMIN/GLOB SERPL: 1.5 RATIO (ref 1.1–2)
ALLENS TEST: ABNORMAL
ALLENS TEST: NORMAL
ALP SERPL-CCNC: 100 UNIT/L (ref 40–150)
ALP SERPL-CCNC: 100 UNIT/L (ref 40–150)
ALP SERPL-CCNC: 101 UNIT/L (ref 40–150)
ALP SERPL-CCNC: 102 UNIT/L (ref 40–150)
ALP SERPL-CCNC: 102 UNIT/L (ref 40–150)
ALP SERPL-CCNC: 113 UNIT/L (ref 40–150)
ALP SERPL-CCNC: 115 UNIT/L (ref 40–150)
ALP SERPL-CCNC: 121 UNIT/L (ref 40–150)
ALP SERPL-CCNC: 123 UNIT/L (ref 40–150)
ALP SERPL-CCNC: 125 UNIT/L (ref 40–150)
ALP SERPL-CCNC: 52 U/L (ref 55–135)
ALP SERPL-CCNC: 52 U/L (ref 55–135)
ALP SERPL-CCNC: 54 U/L (ref 55–135)
ALP SERPL-CCNC: 55 U/L (ref 55–135)
ALP SERPL-CCNC: 56 U/L (ref 55–135)
ALP SERPL-CCNC: 57 U/L (ref 55–135)
ALP SERPL-CCNC: 58 U/L (ref 55–135)
ALP SERPL-CCNC: 59 U/L (ref 55–135)
ALP SERPL-CCNC: 59 U/L (ref 55–135)
ALP SERPL-CCNC: 61 U/L (ref 55–135)
ALP SERPL-CCNC: 62 U/L (ref 55–135)
ALP SERPL-CCNC: 63 U/L (ref 55–135)
ALP SERPL-CCNC: 65 U/L (ref 55–135)
ALP SERPL-CCNC: 67 U/L (ref 55–135)
ALP SERPL-CCNC: 72 U/L (ref 55–135)
ALP SERPL-CCNC: 73 U/L (ref 55–135)
ALP SERPL-CCNC: 78 UNIT/L (ref 40–150)
ALP SERPL-CCNC: 79 UNIT/L (ref 40–150)
ALP SERPL-CCNC: 80 U/L (ref 55–135)
ALP SERPL-CCNC: 80 UNIT/L (ref 40–150)
ALP SERPL-CCNC: 80 UNIT/L (ref 40–150)
ALP SERPL-CCNC: 81 U/L (ref 55–135)
ALP SERPL-CCNC: 81 UNIT/L (ref 40–150)
ALP SERPL-CCNC: 81 UNIT/L (ref 40–150)
ALP SERPL-CCNC: 83 UNIT/L (ref 40–150)
ALP SERPL-CCNC: 86 UNIT/L (ref 40–150)
ALP SERPL-CCNC: 87 UNIT/L (ref 40–150)
ALP SERPL-CCNC: 90 UNIT/L (ref 40–150)
ALP SERPL-CCNC: 92 UNIT/L (ref 40–150)
ALP SERPL-CCNC: 94 UNIT/L (ref 40–150)
ALP SERPL-CCNC: 96 UNIT/L (ref 40–150)
ALP SERPL-CCNC: 98 UNIT/L (ref 40–150)
ALT SERPL W/O P-5'-P-CCNC: 10 U/L (ref 10–44)
ALT SERPL W/O P-5'-P-CCNC: 11 U/L (ref 10–44)
ALT SERPL W/O P-5'-P-CCNC: 5 U/L (ref 10–44)
ALT SERPL W/O P-5'-P-CCNC: 6 U/L (ref 10–44)
ALT SERPL W/O P-5'-P-CCNC: 7 U/L (ref 10–44)
ALT SERPL W/O P-5'-P-CCNC: 8 U/L (ref 10–44)
ALT SERPL W/O P-5'-P-CCNC: 9 U/L (ref 10–44)
ALT SERPL W/O P-5'-P-CCNC: <5 U/L (ref 10–44)
ALT SERPL-CCNC: 10 UNIT/L (ref 0–55)
ALT SERPL-CCNC: 10 UNIT/L (ref 0–55)
ALT SERPL-CCNC: 11 UNIT/L (ref 0–55)
ALT SERPL-CCNC: 12 UNIT/L (ref 0–55)
ALT SERPL-CCNC: 13 UNIT/L (ref 0–55)
ALT SERPL-CCNC: 14 UNIT/L (ref 0–55)
ALT SERPL-CCNC: 15 UNIT/L (ref 0–55)
ALT SERPL-CCNC: 16 UNIT/L (ref 0–55)
ALT SERPL-CCNC: 18 UNIT/L (ref 0–55)
ALT SERPL-CCNC: 20 UNIT/L (ref 0–55)
ALT SERPL-CCNC: 7 UNIT/L (ref 0–55)
ALT SERPL-CCNC: 9 UNIT/L (ref 0–55)
AML FISH ADDITIONAL INFORMATION: NORMAL
AML FISH DISCLAIMER: NORMAL
AML FISH REASON FOR REFERRAL (BLD): NORMAL
AML FISH RELEASED BY: NORMAL
AML FISH RESULT (BLOOD): NORMAL
AML FISH RESULT SUMMARY: NORMAL
AML FISH RESULT TABLE: NORMAL
ANION GAP SERPL CALC-SCNC: 10 MMOL/L (ref 8–16)
ANION GAP SERPL CALC-SCNC: 11 MMOL/L (ref 8–16)
ANION GAP SERPL CALC-SCNC: 12 MMOL/L (ref 8–16)
ANION GAP SERPL CALC-SCNC: 13 MMOL/L (ref 8–16)
ANION GAP SERPL CALC-SCNC: 14 MMOL/L (ref 8–16)
ANION GAP SERPL CALC-SCNC: 15 MMOL/L (ref 8–16)
ANION GAP SERPL CALC-SCNC: 17 MMOL/L (ref 8–16)
ANION GAP SERPL CALC-SCNC: 18 MMOL/L (ref 8–16)
ANION GAP SERPL CALC-SCNC: 24 MMOL/L (ref 8–16)
ANION GAP SERPL CALC-SCNC: 25 MMOL/L (ref 8–16)
ANION GAP SERPL CALC-SCNC: 8 MMOL/L (ref 8–16)
ANION GAP SERPL CALC-SCNC: 9 MMOL/L (ref 8–16)
ANISOCYTOSIS BLD QL SMEAR: ABNORMAL
ANISOCYTOSIS BLD QL SMEAR: NORMAL
ANISOCYTOSIS BLD QL SMEAR: NORMAL
ANISOCYTOSIS BLD QL SMEAR: SLIGHT
ANNOTATION COMMENT IMP: NORMAL
ANNOTATION COMMENT IMP: NORMAL
APTT BLDCRRT: 26.9 SEC (ref 21–32)
APTT BLDCRRT: 28.5 SEC (ref 21–32)
APTT BLDCRRT: 28.8 SEC (ref 21–32)
APTT BLDCRRT: 29.3 SEC (ref 21–32)
APTT BLDCRRT: 29.3 SEC (ref 21–32)
APTT BLDCRRT: 30.4 SEC (ref 21–32)
APTT BLDCRRT: 32 SEC (ref 21–32)
APTT BLDCRRT: 40.6 SEC (ref 21–32)
APTT BLDCRRT: 43 SEC (ref 21–32)
ASCENDING AORTA: 3.7 CM
ASCENDING AORTA: 3.81 CM
ASCENDING AORTA: 3.92 CM
AST SERPL-CCNC: 10 U/L (ref 10–40)
AST SERPL-CCNC: 10 U/L (ref 10–40)
AST SERPL-CCNC: 11 U/L (ref 10–40)
AST SERPL-CCNC: 12 U/L (ref 10–40)
AST SERPL-CCNC: 13 U/L (ref 10–40)
AST SERPL-CCNC: 14 U/L (ref 10–40)
AST SERPL-CCNC: 15 U/L (ref 10–40)
AST SERPL-CCNC: 16 U/L (ref 10–40)
AST SERPL-CCNC: 16 UNIT/L (ref 5–34)
AST SERPL-CCNC: 17 U/L (ref 10–40)
AST SERPL-CCNC: 18 U/L (ref 10–40)
AST SERPL-CCNC: 18 U/L (ref 10–40)
AST SERPL-CCNC: 19 UNIT/L (ref 5–34)
AST SERPL-CCNC: 20 U/L (ref 10–40)
AST SERPL-CCNC: 20 UNIT/L (ref 5–34)
AST SERPL-CCNC: 21 UNIT/L (ref 5–34)
AST SERPL-CCNC: 21 UNIT/L (ref 5–34)
AST SERPL-CCNC: 22 UNIT/L (ref 5–34)
AST SERPL-CCNC: 23 UNIT/L (ref 5–34)
AST SERPL-CCNC: 24 UNIT/L (ref 5–34)
AST SERPL-CCNC: 24 UNIT/L (ref 5–34)
AST SERPL-CCNC: 25 UNIT/L (ref 5–34)
AST SERPL-CCNC: 26 UNIT/L (ref 5–34)
AST SERPL-CCNC: 27 UNIT/L (ref 5–34)
AST SERPL-CCNC: 28 UNIT/L (ref 5–34)
AST SERPL-CCNC: 29 UNIT/L (ref 5–34)
AST SERPL-CCNC: 30 UNIT/L (ref 5–34)
AST SERPL-CCNC: 34 U/L (ref 10–40)
AST SERPL-CCNC: 5 U/L (ref 10–40)
AST SERPL-CCNC: 6 U/L (ref 10–40)
AST SERPL-CCNC: 6 U/L (ref 10–40)
AST SERPL-CCNC: 7 U/L (ref 10–40)
AST SERPL-CCNC: 8 U/L (ref 10–40)
AST SERPL-CCNC: 9 U/L (ref 10–40)
AST SERPL-CCNC: 9 U/L (ref 10–40)
AV INDEX (PROSTH): 0.63
AV INDEX (PROSTH): 0.7
AV INDEX (PROSTH): 0.84
AV MEAN GRADIENT: 7 MMHG
AV MEAN GRADIENT: 7 MMHG
AV MEAN GRADIENT: 8 MMHG
AV PEAK GRADIENT: 10 MMHG
AV PEAK GRADIENT: 14 MMHG
AV PEAK GRADIENT: 14 MMHG
AV VALVE AREA: 2.84 CM2
AV VALVE AREA: 2.96 CM2
AV VALVE AREA: 4.44 CM2
AV VELOCITY RATIO: 0.71
AV VELOCITY RATIO: 0.73
AV VELOCITY RATIO: 0.78
BACTERIA #/AREA URNS AUTO: ABNORMAL /HPF
BACTERIA BLD CULT: ABNORMAL
BACTERIA UR CULT: NO GROWTH
BACTERIA UR CULT: NORMAL
BASO STIPL BLD QL SMEAR: ABNORMAL
BASOPHILS # BLD AUTO: 0.1 X10(3)/MCL (ref 0–0.2)
BASOPHILS # BLD AUTO: ABNORMAL K/UL (ref 0–0.2)
BASOPHILS NFR BLD AUTO: 2 %
BASOPHILS NFR BLD MANUAL: 0 %
BASOPHILS NFR BLD MANUAL: 1 %
BASOPHILS NFR BLD MANUAL: 2 %
BASOPHILS NFR BLD MANUAL: 5 %
BASOPHILS NFR BLD: 0 % (ref 0–1.9)
BASOPHILS NFR BLD: 1 % (ref 0–1.9)
BASOPHILS NFR BLD: 1.5 % (ref 0–1.9)
BASOPHILS NFR BLD: 1.5 % (ref 0–1.9)
BASOPHILS NFR BLD: 10 % (ref 0–1.9)
BASOPHILS NFR BLD: 2 % (ref 0–1.9)
BASOPHILS NFR BLD: 2 % (ref 0–1.9)
BASOPHILS NFR BLD: 2.5 % (ref 0–1.9)
BASOPHILS NFR BLD: 2.5 % (ref 0–1.9)
BASOPHILS NFR BLD: 3 % (ref 0–1.9)
BASOPHILS NFR BLD: 3.5 % (ref 0–1.9)
BASOPHILS NFR BLD: 3.5 % (ref 0–1.9)
BASOPHILS NFR BLD: 4 % (ref 0–1.9)
BASOPHILS NFR BLD: 4 % (ref 0–1.9)
BASOPHILS NFR BLD: 5 % (ref 0–1.9)
BASOPHILS NFR BLD: 6 % (ref 0–1.9)
BASOPHILS NFR BLD: 8 % (ref 0–1.9)
BASOPHILS NFR BLD: 9 % (ref 0–1.9)
BILIRUB SERPL-MCNC: 0.3 MG/DL (ref 0.1–1)
BILIRUB SERPL-MCNC: 0.4 MG/DL
BILIRUB SERPL-MCNC: 0.4 MG/DL
BILIRUB SERPL-MCNC: 0.4 MG/DL (ref 0.1–1)
BILIRUB SERPL-MCNC: 0.5 MG/DL
BILIRUB SERPL-MCNC: 0.5 MG/DL (ref 0.1–1)
BILIRUB SERPL-MCNC: 0.6 MG/DL
BILIRUB SERPL-MCNC: 0.6 MG/DL (ref 0.1–1)
BILIRUB SERPL-MCNC: 0.7 MG/DL
BILIRUB SERPL-MCNC: 0.7 MG/DL (ref 0.1–1)
BILIRUB SERPL-MCNC: 0.8 MG/DL (ref 0.1–1)
BILIRUB SERPL-MCNC: 0.8 MG/DL (ref 0.1–1)
BILIRUB SERPL-MCNC: 0.9 MG/DL
BILIRUB SERPL-MCNC: 0.9 MG/DL
BILIRUB SERPL-MCNC: 1.3 MG/DL
BILIRUB UR QL STRIP: NEGATIVE
BILIRUBIN DIRECT+TOT PNL SERPL-MCNC: 0.2 MG/DL (ref 0–0.5)
BILIRUBIN DIRECT+TOT PNL SERPL-MCNC: 0.2 MG/DL (ref 0–0.8)
BILIRUBIN DIRECT+TOT PNL SERPL-MCNC: 0.2 MG/DL (ref 0–0.8)
BILIRUBIN DIRECT+TOT PNL SERPL-MCNC: 0.3 MG/DL (ref 0–0.5)
BILIRUBIN DIRECT+TOT PNL SERPL-MCNC: 0.3 MG/DL (ref 0–0.8)
BILIRUBIN DIRECT+TOT PNL SERPL-MCNC: 0.4 MG/DL (ref 0–0.5)
BILIRUBIN DIRECT+TOT PNL SERPL-MCNC: 0.4 MG/DL (ref 0–0.8)
BILIRUBIN DIRECT+TOT PNL SERPL-MCNC: 0.5 MG/DL (ref 0–0.5)
BILIRUBIN DIRECT+TOT PNL SERPL-MCNC: 0.5 MG/DL (ref 0–0.8)
BILIRUBIN DIRECT+TOT PNL SERPL-MCNC: 0.6 MG/DL (ref 0–0.8)
BILIRUBIN DIRECT+TOT PNL SERPL-MCNC: 0.6 MG/DL (ref 0–0.8)
BILIRUBIN DIRECT+TOT PNL SERPL-MCNC: 0.8 MG/DL (ref 0–0.8)
BLASTS NFR BLD MANUAL: 13 %
BLASTS NFR BLD MANUAL: 15 %
BLASTS NFR BLD MANUAL: 17 %
BLASTS NFR BLD MANUAL: 19 %
BLASTS NFR BLD MANUAL: 21 %
BLASTS NFR BLD MANUAL: 23 %
BLASTS NFR BLD MANUAL: 23 %
BLASTS NFR BLD MANUAL: 24 %
BLASTS NFR BLD MANUAL: 26 %
BLASTS NFR BLD MANUAL: 29 %
BLASTS NFR BLD MANUAL: 30 %
BLASTS NFR BLD MANUAL: 30 %
BLASTS NFR BLD MANUAL: 31 %
BLASTS NFR BLD MANUAL: 31.5 %
BLASTS NFR BLD MANUAL: 32.5 %
BLASTS NFR BLD MANUAL: 33 %
BLASTS NFR BLD MANUAL: 34 %
BLASTS NFR BLD MANUAL: 35 %
BLASTS NFR BLD MANUAL: 37 %
BLASTS NFR BLD MANUAL: 38 %
BLASTS NFR BLD MANUAL: 38 %
BLASTS NFR BLD MANUAL: 40 %
BLASTS NFR BLD MANUAL: 41 %
BLASTS NFR BLD MANUAL: 42 %
BLASTS NFR BLD MANUAL: 46 %
BLASTS NFR BLD MANUAL: 46 %
BLASTS NFR BLD MANUAL: 46.5 %
BLASTS NFR BLD MANUAL: 48 %
BLASTS NFR BLD MANUAL: 52 %
BLASTS NFR BLD MANUAL: 52 %
BLASTS NFR BLD MANUAL: 52.5 %
BLASTS NFR BLD MANUAL: 54 %
BLASTS NFR BLD MANUAL: 54 %
BLASTS NFR BLD MANUAL: 55 %
BLASTS NFR BLD MANUAL: 56 %
BLASTS NFR BLD MANUAL: 60 %
BLD GP AB SCN CELLS X3 SERPL QL: NORMAL
BLD PROD TYP BPU: NORMAL
BLOOD UNIT EXPIRATION DATE: NORMAL
BLOOD UNIT TYPE CODE: 5100
BLOOD UNIT TYPE CODE: 6200
BLOOD UNIT TYPE CODE: 6200
BLOOD UNIT TYPE CODE: 7300
BLOOD UNIT TYPE CODE: 7300
BLOOD UNIT TYPE: NORMAL
BNP SERPL-MCNC: 1311 PG/ML (ref 0–99)
BNP SERPL-MCNC: 1540 PG/ML (ref 0–99)
BNP SERPL-MCNC: 610 PG/ML (ref 0–99)
BODY SITE - BONE MARROW: NORMAL
BSA FOR ECHO PROCEDURE: 2.6 M2
BSA FOR ECHO PROCEDURE: 2.6 M2
BSA FOR ECHO PROCEDURE: 2.62 M2
BUN SERPL-MCNC: 10 MG/DL (ref 8.4–25.7)
BUN SERPL-MCNC: 10.1 MG/DL (ref 8.4–25.7)
BUN SERPL-MCNC: 10.2 MG/DL (ref 8.4–25.7)
BUN SERPL-MCNC: 102 MG/DL (ref 6–20)
BUN SERPL-MCNC: 11.4 MG/DL (ref 8.4–25.7)
BUN SERPL-MCNC: 11.5 MG/DL (ref 8.4–25.7)
BUN SERPL-MCNC: 11.7 MG/DL (ref 8.4–25.7)
BUN SERPL-MCNC: 11.7 MG/DL (ref 8.4–25.7)
BUN SERPL-MCNC: 13 MG/DL (ref 8.4–25.7)
BUN SERPL-MCNC: 13.1 MG/DL (ref 8.4–25.7)
BUN SERPL-MCNC: 15 MG/DL (ref 8.4–25.7)
BUN SERPL-MCNC: 16.7 MG/DL (ref 8.4–25.7)
BUN SERPL-MCNC: 16.8 MG/DL (ref 8.4–25.7)
BUN SERPL-MCNC: 17.5 MG/DL (ref 8.4–25.7)
BUN SERPL-MCNC: 20 MG/DL (ref 6–20)
BUN SERPL-MCNC: 21.6 MG/DL (ref 8.4–25.7)
BUN SERPL-MCNC: 23 MG/DL (ref 6–20)
BUN SERPL-MCNC: 23.6 MG/DL (ref 8.4–25.7)
BUN SERPL-MCNC: 25 MG/DL (ref 6–20)
BUN SERPL-MCNC: 26 MG/DL (ref 6–20)
BUN SERPL-MCNC: 26 MG/DL (ref 6–20)
BUN SERPL-MCNC: 26.1 MG/DL (ref 8.4–25.7)
BUN SERPL-MCNC: 28 MG/DL (ref 6–20)
BUN SERPL-MCNC: 29 MG/DL (ref 6–20)
BUN SERPL-MCNC: 29.7 MG/DL (ref 8.4–25.7)
BUN SERPL-MCNC: 30 MG/DL (ref 6–20)
BUN SERPL-MCNC: 31 MG/DL (ref 6–20)
BUN SERPL-MCNC: 32 MG/DL (ref 6–20)
BUN SERPL-MCNC: 33 MG/DL (ref 6–20)
BUN SERPL-MCNC: 34 MG/DL (ref 6–20)
BUN SERPL-MCNC: 34.4 MG/DL (ref 8.4–25.7)
BUN SERPL-MCNC: 35 MG/DL (ref 6–20)
BUN SERPL-MCNC: 40 MG/DL (ref 6–20)
BUN SERPL-MCNC: 41 MG/DL (ref 6–20)
BUN SERPL-MCNC: 44 MG/DL (ref 6–20)
BUN SERPL-MCNC: 45 MG/DL (ref 6–20)
BUN SERPL-MCNC: 45 MG/DL (ref 6–20)
BUN SERPL-MCNC: 46 MG/DL (ref 6–20)
BUN SERPL-MCNC: 47 MG/DL (ref 6–20)
BUN SERPL-MCNC: 47 MG/DL (ref 6–20)
BUN SERPL-MCNC: 49 MG/DL (ref 6–20)
BUN SERPL-MCNC: 50 MG/DL (ref 6–20)
BUN SERPL-MCNC: 50 MG/DL (ref 6–20)
BUN SERPL-MCNC: 51 MG/DL (ref 6–20)
BUN SERPL-MCNC: 52 MG/DL (ref 6–20)
BUN SERPL-MCNC: 52 MG/DL (ref 6–20)
BUN SERPL-MCNC: 53 MG/DL (ref 6–20)
BUN SERPL-MCNC: 54 MG/DL (ref 6–20)
BUN SERPL-MCNC: 54 MG/DL (ref 6–20)
BUN SERPL-MCNC: 55 MG/DL (ref 6–20)
BUN SERPL-MCNC: 56 MG/DL (ref 6–20)
BUN SERPL-MCNC: 57 MG/DL (ref 6–20)
BUN SERPL-MCNC: 58 MG/DL (ref 6–20)
BUN SERPL-MCNC: 59 MG/DL (ref 6–20)
BUN SERPL-MCNC: 6.1 MG/DL (ref 8.4–25.7)
BUN SERPL-MCNC: 61 MG/DL (ref 6–20)
BUN SERPL-MCNC: 61 MG/DL (ref 6–20)
BUN SERPL-MCNC: 65 MG/DL (ref 6–20)
BUN SERPL-MCNC: 70 MG/DL (ref 6–20)
BUN SERPL-MCNC: 71 MG/DL (ref 6–20)
BUN SERPL-MCNC: 72 MG/DL (ref 6–20)
BUN SERPL-MCNC: 72 MG/DL (ref 6–20)
BUN SERPL-MCNC: 73 MG/DL (ref 6–20)
BUN SERPL-MCNC: 74 MG/DL (ref 6–20)
BUN SERPL-MCNC: 74 MG/DL (ref 6–20)
BUN SERPL-MCNC: 8 MG/DL (ref 8.4–25.7)
BUN SERPL-MCNC: 9.5 MG/DL (ref 8.4–25.7)
BURR CELLS BLD QL SMEAR: ABNORMAL
CALCIUM SERPL-MCNC: 6.3 MG/DL (ref 8.7–10.5)
CALCIUM SERPL-MCNC: 7.6 MG/DL (ref 8.4–10.2)
CALCIUM SERPL-MCNC: 7.7 MG/DL (ref 8.7–10.5)
CALCIUM SERPL-MCNC: 7.8 MG/DL (ref 8.7–10.5)
CALCIUM SERPL-MCNC: 7.9 MG/DL (ref 8.4–10.2)
CALCIUM SERPL-MCNC: 7.9 MG/DL (ref 8.7–10.5)
CALCIUM SERPL-MCNC: 8.1 MG/DL (ref 8.7–10.5)
CALCIUM SERPL-MCNC: 8.1 MG/DL (ref 8.7–10.5)
CALCIUM SERPL-MCNC: 8.2 MG/DL (ref 8.4–10.2)
CALCIUM SERPL-MCNC: 8.3 MG/DL (ref 8.4–10.2)
CALCIUM SERPL-MCNC: 8.3 MG/DL (ref 8.4–10.2)
CALCIUM SERPL-MCNC: 8.3 MG/DL (ref 8.7–10.5)
CALCIUM SERPL-MCNC: 8.3 MG/DL (ref 8.7–10.5)
CALCIUM SERPL-MCNC: 8.4 MG/DL (ref 8.4–10.2)
CALCIUM SERPL-MCNC: 8.4 MG/DL (ref 8.7–10.5)
CALCIUM SERPL-MCNC: 8.5 MG/DL (ref 8.4–10.2)
CALCIUM SERPL-MCNC: 8.5 MG/DL (ref 8.7–10.5)
CALCIUM SERPL-MCNC: 8.6 MG/DL (ref 8.4–10.2)
CALCIUM SERPL-MCNC: 8.6 MG/DL (ref 8.7–10.5)
CALCIUM SERPL-MCNC: 8.7 MG/DL (ref 8.4–10.2)
CALCIUM SERPL-MCNC: 8.7 MG/DL (ref 8.7–10.5)
CALCIUM SERPL-MCNC: 8.8 MG/DL (ref 8.4–10.2)
CALCIUM SERPL-MCNC: 8.8 MG/DL (ref 8.7–10.5)
CALCIUM SERPL-MCNC: 8.9 MG/DL (ref 8.4–10.2)
CALCIUM SERPL-MCNC: 8.9 MG/DL (ref 8.7–10.5)
CALCIUM SERPL-MCNC: 9 MG/DL (ref 8.4–10.2)
CALCIUM SERPL-MCNC: 9 MG/DL (ref 8.7–10.5)
CALCIUM SERPL-MCNC: 9.2 MG/DL (ref 8.4–10.2)
CALCIUM SERPL-MCNC: 9.2 MG/DL (ref 8.7–10.5)
CALCIUM SERPL-MCNC: 9.3 MG/DL (ref 8.7–10.5)
CALCIUM SERPL-MCNC: 9.4 MG/DL (ref 8.7–10.5)
CALCIUM SERPL-MCNC: 9.5 MG/DL (ref 8.7–10.5)
CHLORIDE SERPL-SCNC: 100 MMOL/L (ref 95–110)
CHLORIDE SERPL-SCNC: 100 MMOL/L (ref 98–107)
CHLORIDE SERPL-SCNC: 100 MMOL/L (ref 98–107)
CHLORIDE SERPL-SCNC: 103 MMOL/L (ref 98–107)
CHLORIDE SERPL-SCNC: 105 MMOL/L (ref 95–110)
CHLORIDE SERPL-SCNC: 105 MMOL/L (ref 98–107)
CHLORIDE SERPL-SCNC: 79 MMOL/L (ref 98–107)
CHLORIDE SERPL-SCNC: 81 MMOL/L (ref 98–107)
CHLORIDE SERPL-SCNC: 86 MMOL/L (ref 95–110)
CHLORIDE SERPL-SCNC: 86 MMOL/L (ref 98–107)
CHLORIDE SERPL-SCNC: 86 MMOL/L (ref 98–107)
CHLORIDE SERPL-SCNC: 87 MMOL/L (ref 95–110)
CHLORIDE SERPL-SCNC: 89 MMOL/L (ref 95–110)
CHLORIDE SERPL-SCNC: 90 MMOL/L (ref 95–110)
CHLORIDE SERPL-SCNC: 90 MMOL/L (ref 98–107)
CHLORIDE SERPL-SCNC: 91 MMOL/L (ref 95–110)
CHLORIDE SERPL-SCNC: 91 MMOL/L (ref 98–107)
CHLORIDE SERPL-SCNC: 92 MMOL/L (ref 95–110)
CHLORIDE SERPL-SCNC: 92 MMOL/L (ref 98–107)
CHLORIDE SERPL-SCNC: 93 MMOL/L (ref 95–110)
CHLORIDE SERPL-SCNC: 93 MMOL/L (ref 98–107)
CHLORIDE SERPL-SCNC: 93 MMOL/L (ref 98–107)
CHLORIDE SERPL-SCNC: 94 MMOL/L (ref 95–110)
CHLORIDE SERPL-SCNC: 95 MMOL/L (ref 95–110)
CHLORIDE SERPL-SCNC: 95 MMOL/L (ref 98–107)
CHLORIDE SERPL-SCNC: 96 MMOL/L (ref 95–110)
CHLORIDE SERPL-SCNC: 96 MMOL/L (ref 98–107)
CHLORIDE SERPL-SCNC: 97 MMOL/L (ref 95–110)
CHLORIDE SERPL-SCNC: 97 MMOL/L (ref 98–107)
CHLORIDE SERPL-SCNC: 98 MMOL/L (ref 95–110)
CHLORIDE SERPL-SCNC: 99 MMOL/L (ref 95–110)
CHLORIDE SERPL-SCNC: 99 MMOL/L (ref 98–107)
CHROM BANDING METHOD: NORMAL
CHROM BANDING METHOD: NORMAL
CHROMOSOME ANALYSIS BL RELEASED BY: NORMAL
CHROMOSOME ANALYSIS BL RESULT SUMMARY: NORMAL
CHROMOSOME ANALYSIS BM ADDITIONAL INFORMATION: NORMAL
CHROMOSOME ANALYSIS BM RELEASED BY: NORMAL
CHROMOSOME ANALYSIS BM RESULT SUMMARY: NORMAL
CK SERPL-CCNC: 23 U/L (ref 20–200)
CLARITY UR REFRACT.AUTO: ABNORMAL
CLINICAL CYTOGENETICIST REVIEW: NORMAL
CLINICAL DIAGNOSIS - BONE MARROW: NORMAL
CO2 SERPL-SCNC: 19 MMOL/L (ref 23–29)
CO2 SERPL-SCNC: 21 MMOL/L (ref 22–29)
CO2 SERPL-SCNC: 22 MMOL/L (ref 23–29)
CO2 SERPL-SCNC: 24 MMOL/L (ref 23–29)
CO2 SERPL-SCNC: 25 MMOL/L (ref 22–29)
CO2 SERPL-SCNC: 25 MMOL/L (ref 23–29)
CO2 SERPL-SCNC: 26 MMOL/L (ref 22–29)
CO2 SERPL-SCNC: 26 MMOL/L (ref 23–29)
CO2 SERPL-SCNC: 27 MMOL/L (ref 22–29)
CO2 SERPL-SCNC: 27 MMOL/L (ref 22–29)
CO2 SERPL-SCNC: 27 MMOL/L (ref 23–29)
CO2 SERPL-SCNC: 28 MMOL/L (ref 22–29)
CO2 SERPL-SCNC: 28 MMOL/L (ref 23–29)
CO2 SERPL-SCNC: 28 MMOL/L (ref 23–29)
CO2 SERPL-SCNC: 29 MMOL/L (ref 22–29)
CO2 SERPL-SCNC: 29 MMOL/L (ref 23–29)
CO2 SERPL-SCNC: 30 MMOL/L (ref 22–29)
CO2 SERPL-SCNC: 30 MMOL/L (ref 23–29)
CO2 SERPL-SCNC: 31 MMOL/L (ref 22–29)
CO2 SERPL-SCNC: 31 MMOL/L (ref 22–29)
CO2 SERPL-SCNC: 31 MMOL/L (ref 23–29)
CO2 SERPL-SCNC: 32 MMOL/L (ref 22–29)
CO2 SERPL-SCNC: 32 MMOL/L (ref 22–29)
CO2 SERPL-SCNC: 32 MMOL/L (ref 23–29)
CO2 SERPL-SCNC: 33 MMOL/L (ref 22–29)
CO2 SERPL-SCNC: 33 MMOL/L (ref 22–29)
CO2 SERPL-SCNC: 33 MMOL/L (ref 23–29)
CO2 SERPL-SCNC: 34 MMOL/L (ref 22–29)
CO2 SERPL-SCNC: 34 MMOL/L (ref 23–29)
CO2 SERPL-SCNC: 35 MMOL/L (ref 23–29)
CO2 SERPL-SCNC: 35 MMOL/L (ref 23–29)
CO2 SERPL-SCNC: 37 MMOL/L (ref 23–29)
CO2 SERPL-SCNC: 37 MMOL/L (ref 23–29)
CODING SYSTEM: NORMAL
COLOR UR AUTO: ABNORMAL
COMMENT: NORMAL
CREAT SERPL-MCNC: 0.63 MG/DL (ref 0.73–1.18)
CREAT SERPL-MCNC: 0.67 MG/DL (ref 0.73–1.18)
CREAT SERPL-MCNC: 0.69 MG/DL (ref 0.73–1.18)
CREAT SERPL-MCNC: 0.71 MG/DL (ref 0.73–1.18)
CREAT SERPL-MCNC: 0.71 MG/DL (ref 0.73–1.18)
CREAT SERPL-MCNC: 0.73 MG/DL (ref 0.73–1.18)
CREAT SERPL-MCNC: 0.73 MG/DL (ref 0.73–1.18)
CREAT SERPL-MCNC: 0.75 MG/DL (ref 0.73–1.18)
CREAT SERPL-MCNC: 0.75 MG/DL (ref 0.73–1.18)
CREAT SERPL-MCNC: 0.76 MG/DL (ref 0.73–1.18)
CREAT SERPL-MCNC: 0.77 MG/DL (ref 0.73–1.18)
CREAT SERPL-MCNC: 0.77 MG/DL (ref 0.73–1.18)
CREAT SERPL-MCNC: 0.8 MG/DL (ref 0.5–1.4)
CREAT SERPL-MCNC: 0.81 MG/DL (ref 0.73–1.18)
CREAT SERPL-MCNC: 0.81 MG/DL (ref 0.73–1.18)
CREAT SERPL-MCNC: 0.88 MG/DL (ref 0.73–1.18)
CREAT SERPL-MCNC: 0.89 MG/DL (ref 0.73–1.18)
CREAT SERPL-MCNC: 0.9 MG/DL (ref 0.5–1.4)
CREAT SERPL-MCNC: 0.93 MG/DL (ref 0.73–1.18)
CREAT SERPL-MCNC: 0.96 MG/DL (ref 0.73–1.18)
CREAT SERPL-MCNC: 1 MG/DL (ref 0.5–1.4)
CREAT SERPL-MCNC: 1 MG/DL (ref 0.73–1.18)
CREAT SERPL-MCNC: 1.03 MG/DL (ref 0.73–1.18)
CREAT SERPL-MCNC: 1.04 MG/DL (ref 0.73–1.18)
CREAT SERPL-MCNC: 1.09 MG/DL (ref 0.73–1.18)
CREAT SERPL-MCNC: 1.1 MG/DL (ref 0.5–1.4)
CREAT SERPL-MCNC: 1.17 MG/DL (ref 0.73–1.18)
CREAT SERPL-MCNC: 1.2 MG/DL (ref 0.5–1.4)
CREAT SERPL-MCNC: 1.2 MG/DL (ref 0.5–1.4)
CREAT SERPL-MCNC: 1.3 MG/DL (ref 0.5–1.4)
CREAT SERPL-MCNC: 1.3 MG/DL (ref 0.5–1.4)
CREAT SERPL-MCNC: 1.34 MG/DL (ref 0.73–1.18)
CREAT SERPL-MCNC: 1.4 MG/DL (ref 0.5–1.4)
CREAT SERPL-MCNC: 1.5 MG/DL (ref 0.5–1.4)
CREAT SERPL-MCNC: 1.6 MG/DL (ref 0.5–1.4)
CREAT SERPL-MCNC: 1.6 MG/DL (ref 0.5–1.4)
CREAT SERPL-MCNC: 1.7 MG/DL (ref 0.5–1.4)
CREAT SERPL-MCNC: 1.8 MG/DL (ref 0.5–1.4)
CREAT SERPL-MCNC: 2 MG/DL (ref 0.5–1.4)
CREAT SERPL-MCNC: 2.1 MG/DL (ref 0.5–1.4)
CREAT SERPL-MCNC: 2.2 MG/DL (ref 0.5–1.4)
CRP SERPL-MCNC: 101.5 MG/L (ref 0–8.2)
CRP SERPL-MCNC: 28.6 MG/L (ref 0–8.2)
CRP SERPL-MCNC: 57.5 MG/L (ref 0–8.2)
CV ECHO LV RWT: 0.33 CM
CV ECHO LV RWT: 0.34 CM
CV ECHO LV RWT: 0.36 CM
D DIMER PPP IA.FEU-MCNC: 0.36 MG/L FEU
D DIMER PPP IA.FEU-MCNC: 0.44 MG/L FEU
D DIMER PPP IA.FEU-MCNC: 0.54 MG/L FEU
D DIMER PPP IA.FEU-MCNC: 0.55 MG/L FEU
D DIMER PPP IA.FEU-MCNC: 0.59 MG/L FEU
D DIMER PPP IA.FEU-MCNC: 0.7 MG/L FEU
D DIMER PPP IA.FEU-MCNC: 0.8 MG/L FEU
D DIMER PPP IA.FEU-MCNC: 0.81 MG/L FEU
D DIMER PPP IA.FEU-MCNC: 1.11 MG/L FEU
D DIMER PPP IA.FEU-MCNC: 1.43 MG/L FEU
DACRYOCYTES BLD QL SMEAR: ABNORMAL
DACRYOCYTES BLD QL SMEAR: NORMAL
DACRYOCYTES BLD QL SMEAR: NORMAL
DELSYS: ABNORMAL
DELSYS: NORMAL
DIFFERENTIAL METHOD: ABNORMAL
DISPENSE STATUS: NORMAL
DNA/RNA EXTRACT AND HOLD RESULT: NORMAL
DNA/RNA EXTRACTION: NORMAL
DOP CALC AO PEAK VEL: 1.61 M/S
DOP CALC AO PEAK VEL: 1.85 M/S
DOP CALC AO PEAK VEL: 1.88 M/S
DOP CALC AO VTI: 30.8 CM
DOP CALC AO VTI: 31.81 CM
DOP CALC AO VTI: 32.06 CM
DOP CALC LVOT AREA: 4.1 CM2
DOP CALC LVOT AREA: 4.7 CM2
DOP CALC LVOT AREA: 5.3 CM2
DOP CALC LVOT DIAMETER: 2.28 CM
DOP CALC LVOT DIAMETER: 2.44 CM
DOP CALC LVOT DIAMETER: 2.6 CM
DOP CALC LVOT PEAK VEL: 1.26 M/S
DOP CALC LVOT PEAK VEL: 1.31 M/S
DOP CALC LVOT PEAK VEL: 1.38 M/S
DOP CALC LVOT STROKE VOLUME: 136.75 CM3
DOP CALC LVOT STROKE VOLUME: 91.08 CM3
DOP CALC LVOT STROKE VOLUME: 94.31 CM3
DOP CALCLVOT PEAK VEL VTI: 20.18 CM
DOP CALCLVOT PEAK VEL VTI: 22.32 CM
DOP CALCLVOT PEAK VEL VTI: 25.77 CM
DX: NORMAL
E WAVE DECELERATION TIME: 174.73 MSEC
E WAVE DECELERATION TIME: 187.81 MSEC
E WAVE DECELERATION TIME: 245.47 MSEC
E/A RATIO: 0.9
E/A RATIO: 1.16
E/A RATIO: 1.22
E/E' RATIO: 11.28 M/S
E/E' RATIO: 13.33 M/S
E/E' RATIO: 18.59 M/S
ECHO LV POSTERIOR WALL: 0.96 CM (ref 0.6–1.1)
ECHO LV POSTERIOR WALL: 0.98 CM (ref 0.6–1.1)
ECHO LV POSTERIOR WALL: 1.08 CM (ref 0.6–1.1)
EJECTION FRACTION: 60 %
EJECTION FRACTION: 60 %
EJECTION FRACTION: 75 %
EOSINOPHIL # BLD AUTO: 0 X10(3)/MCL (ref 0–0.9)
EOSINOPHIL # BLD AUTO: ABNORMAL K/UL (ref 0–0.5)
EOSINOPHIL NFR BLD AUTO: 0 %
EOSINOPHIL NFR BLD MANUAL: 0 %
EOSINOPHIL NFR BLD MANUAL: 1 %
EOSINOPHIL NFR BLD MANUAL: 2 %
EOSINOPHIL NFR BLD MANUAL: 4 %
EOSINOPHIL NFR BLD: 0 % (ref 0–8)
EOSINOPHIL NFR BLD: 0.5 % (ref 0–8)
EOSINOPHIL NFR BLD: 1 % (ref 0–8)
EOSINOPHIL NFR BLD: 2 % (ref 0–8)
ERYTHROCYTE [DISTWIDTH] IN BLOOD BY AUTOMATED COUNT: 16.4 % (ref 11.5–14.5)
ERYTHROCYTE [DISTWIDTH] IN BLOOD BY AUTOMATED COUNT: 16.5 % (ref 11.5–14.5)
ERYTHROCYTE [DISTWIDTH] IN BLOOD BY AUTOMATED COUNT: 16.6 % (ref 11.5–14.5)
ERYTHROCYTE [DISTWIDTH] IN BLOOD BY AUTOMATED COUNT: 16.8 % (ref 11.5–14.5)
ERYTHROCYTE [DISTWIDTH] IN BLOOD BY AUTOMATED COUNT: 16.8 % (ref 11.5–14.5)
ERYTHROCYTE [DISTWIDTH] IN BLOOD BY AUTOMATED COUNT: 17 % (ref 11.5–14.5)
ERYTHROCYTE [DISTWIDTH] IN BLOOD BY AUTOMATED COUNT: 17.1 % (ref 11.5–14.5)
ERYTHROCYTE [DISTWIDTH] IN BLOOD BY AUTOMATED COUNT: 17.2 % (ref 11.5–14.5)
ERYTHROCYTE [DISTWIDTH] IN BLOOD BY AUTOMATED COUNT: 17.3 % (ref 11.5–14.5)
ERYTHROCYTE [DISTWIDTH] IN BLOOD BY AUTOMATED COUNT: 17.4 % (ref 11.5–14.5)
ERYTHROCYTE [DISTWIDTH] IN BLOOD BY AUTOMATED COUNT: 17.5 % (ref 11.5–14.5)
ERYTHROCYTE [DISTWIDTH] IN BLOOD BY AUTOMATED COUNT: 17.5 % (ref 11.5–14.5)
ERYTHROCYTE [DISTWIDTH] IN BLOOD BY AUTOMATED COUNT: 17.6 % (ref 11.5–14.5)
ERYTHROCYTE [DISTWIDTH] IN BLOOD BY AUTOMATED COUNT: 17.7 % (ref 11.5–14.5)
ERYTHROCYTE [DISTWIDTH] IN BLOOD BY AUTOMATED COUNT: 17.8 % (ref 11.5–14.5)
ERYTHROCYTE [DISTWIDTH] IN BLOOD BY AUTOMATED COUNT: 17.8 % (ref 11.5–14.5)
ERYTHROCYTE [DISTWIDTH] IN BLOOD BY AUTOMATED COUNT: 17.9 % (ref 11.5–14.5)
ERYTHROCYTE [DISTWIDTH] IN BLOOD BY AUTOMATED COUNT: 18 % (ref 11.5–14.5)
ERYTHROCYTE [DISTWIDTH] IN BLOOD BY AUTOMATED COUNT: 18.1 % (ref 11.5–14.5)
ERYTHROCYTE [DISTWIDTH] IN BLOOD BY AUTOMATED COUNT: 18.3 % (ref 11.5–14.5)
ERYTHROCYTE [DISTWIDTH] IN BLOOD BY AUTOMATED COUNT: 18.5 % (ref 11.5–14.5)
ERYTHROCYTE [DISTWIDTH] IN BLOOD BY AUTOMATED COUNT: 18.6 % (ref 11.5–14.5)
ERYTHROCYTE [DISTWIDTH] IN BLOOD BY AUTOMATED COUNT: 18.7 % (ref 11.5–14.5)
ERYTHROCYTE [DISTWIDTH] IN BLOOD BY AUTOMATED COUNT: 18.7 % (ref 11.5–14.5)
ERYTHROCYTE [DISTWIDTH] IN BLOOD BY AUTOMATED COUNT: 18.8 % (ref 11.5–14.5)
ERYTHROCYTE [DISTWIDTH] IN BLOOD BY AUTOMATED COUNT: 18.9 % (ref 11.5–14.5)
ERYTHROCYTE [DISTWIDTH] IN BLOOD BY AUTOMATED COUNT: 18.9 % (ref 11.5–14.5)
ERYTHROCYTE [DISTWIDTH] IN BLOOD BY AUTOMATED COUNT: 19 % (ref 11.5–14.5)
ERYTHROCYTE [DISTWIDTH] IN BLOOD BY AUTOMATED COUNT: 19.3 % (ref 11.5–14.5)
ERYTHROCYTE [DISTWIDTH] IN BLOOD BY AUTOMATED COUNT: 19.4 % (ref 11.5–14.5)
ERYTHROCYTE [DISTWIDTH] IN BLOOD BY AUTOMATED COUNT: 19.9 % (ref 11.5–14.5)
ERYTHROCYTE [DISTWIDTH] IN BLOOD BY AUTOMATED COUNT: 20 % (ref 11.5–14.5)
ERYTHROCYTE [DISTWIDTH] IN BLOOD BY AUTOMATED COUNT: 20 % (ref 11.5–14.5)
ERYTHROCYTE [DISTWIDTH] IN BLOOD BY AUTOMATED COUNT: 20.1 % (ref 11.5–14.5)
ERYTHROCYTE [DISTWIDTH] IN BLOOD BY AUTOMATED COUNT: 20.1 % (ref 11.5–14.5)
ERYTHROCYTE [DISTWIDTH] IN BLOOD BY AUTOMATED COUNT: 20.3 % (ref 11.5–14.5)
ERYTHROCYTE [DISTWIDTH] IN BLOOD BY AUTOMATED COUNT: 20.8 % (ref 11.5–14.5)
ERYTHROCYTE [DISTWIDTH] IN BLOOD BY AUTOMATED COUNT: 21.3 % (ref 11.5–14.5)
ERYTHROCYTE [DISTWIDTH] IN BLOOD BY AUTOMATED COUNT: 21.4 % (ref 11.5–14.5)
ERYTHROCYTE [DISTWIDTH] IN BLOOD BY AUTOMATED COUNT: 21.5 % (ref 11.5–14.5)
ERYTHROCYTE [DISTWIDTH] IN BLOOD BY AUTOMATED COUNT: 21.7 % (ref 11.5–14.5)
ERYTHROCYTE [SEDIMENTATION RATE] IN BLOOD BY WESTERGREN METHOD: 18 MM/H
ERYTHROCYTE [SEDIMENTATION RATE] IN BLOOD BY WESTERGREN METHOD: 18 MM/H
ERYTHROCYTE [SEDIMENTATION RATE] IN BLOOD BY WESTERGREN METHOD: 26 MM/H
ERYTHROCYTE [SEDIMENTATION RATE] IN BLOOD BY WESTERGREN METHOD: 30 MM/H
ERYTHROCYTE [SEDIMENTATION RATE] IN BLOOD BY WESTERGREN METHOD: 64 MM/HR (ref 0–23)
EST CREAT CLEARANCE SER (OHS): 32.37 ML/MIN
EST. GFR  (AFRICAN AMERICAN): 36.5 ML/MIN/1.73 M^2
EST. GFR  (AFRICAN AMERICAN): 38.7 ML/MIN/1.73 M^2
EST. GFR  (AFRICAN AMERICAN): 41 ML/MIN/1.73 M^2
EST. GFR  (AFRICAN AMERICAN): 46.6 ML/MIN/1.73 M^2
EST. GFR  (AFRICAN AMERICAN): 49.9 ML/MIN/1.73 M^2
EST. GFR  (AFRICAN AMERICAN): 53.7 ML/MIN/1.73 M^2
EST. GFR  (AFRICAN AMERICAN): 53.7 ML/MIN/1.73 M^2
EST. GFR  (AFRICAN AMERICAN): 58.1 ML/MIN/1.73 M^2
EST. GFR  (AFRICAN AMERICAN): >60 ML/MIN/1.73 M^2
EST. GFR  (NON AFRICAN AMERICAN): 31.6 ML/MIN/1.73 M^2
EST. GFR  (NON AFRICAN AMERICAN): 33.4 ML/MIN/1.73 M^2
EST. GFR  (NON AFRICAN AMERICAN): 35.5 ML/MIN/1.73 M^2
EST. GFR  (NON AFRICAN AMERICAN): 40.3 ML/MIN/1.73 M^2
EST. GFR  (NON AFRICAN AMERICAN): 43.2 ML/MIN/1.73 M^2
EST. GFR  (NON AFRICAN AMERICAN): 46.5 ML/MIN/1.73 M^2
EST. GFR  (NON AFRICAN AMERICAN): 46.5 ML/MIN/1.73 M^2
EST. GFR  (NON AFRICAN AMERICAN): 50.2 ML/MIN/1.73 M^2
EST. GFR  (NON AFRICAN AMERICAN): 54.6 ML/MIN/1.73 M^2
EST. GFR  (NON AFRICAN AMERICAN): 59.7 ML/MIN/1.73 M^2
EST. GFR  (NON AFRICAN AMERICAN): 59.7 ML/MIN/1.73 M^2
EST. GFR  (NON AFRICAN AMERICAN): >60 ML/MIN/1.73 M^2
EXHR SPECIMEN TYPE: NORMAL
FAML SPECIMEN: NORMAL
FERRITIN SERPL-MCNC: 1021 NG/ML (ref 20–300)
FERRITIN SERPL-MCNC: 1029 NG/ML (ref 20–300)
FERRITIN SERPL-MCNC: 1078 NG/ML (ref 20–300)
FERRITIN SERPL-MCNC: 1146 NG/ML (ref 20–300)
FERRITIN SERPL-MCNC: 1195 NG/ML (ref 20–300)
FERRITIN SERPL-MCNC: 1260 NG/ML (ref 20–300)
FERRITIN SERPL-MCNC: 1295 NG/ML (ref 20–300)
FERRITIN SERPL-MCNC: 1931 NG/ML (ref 20–300)
FERRITIN SERPL-MCNC: 320.55 NG/ML (ref 21.81–274.66)
FERRITIN SERPL-MCNC: 526.18 NG/ML (ref 21.81–274.66)
FERRITIN SERPL-MCNC: 623.37 NG/ML (ref 21.81–274.66)
FERRITIN SERPL-MCNC: 996 NG/ML (ref 20–300)
FIBRINOGEN PPP-MCNC: 262 MG/DL (ref 182–400)
FIBRINOGEN PPP-MCNC: 346 MG/DL (ref 182–400)
FIBRINOGEN PPP-MCNC: 481 MG/DL (ref 182–400)
FIBRINOGEN PPP-MCNC: 533 MG/DL (ref 182–400)
FIBRINOGEN PPP-MCNC: 563 MG/DL (ref 182–400)
FIBRINOGEN PPP-MCNC: 609 MG/DL (ref 182–400)
FIBRINOGEN PPP-MCNC: 622 MG/DL (ref 182–400)
FIBRINOGEN PPP-MCNC: 635 MG/DL (ref 182–400)
FIBRINOGEN PPP-MCNC: 692 MG/DL (ref 182–400)
FINAL PATHOLOGIC DIAGNOSIS: NORMAL
FIO2: 100
FIO2: 36
FIO2: 50
FIO2: 60
FIO2: 70
FIO2: 90
FLOW CYTOMETRY ANTIBODIES ANALYZED - BONE MARROW: NORMAL
FLOW CYTOMETRY COMMENT - BONE MARROW: NORMAL
FLOW CYTOMETRY INTERPRETATION - BONE MARROW: NORMAL
FLOW: 4
FLT3 RESULT: NORMAL
FRACTIONAL SHORTENING: 35 % (ref 28–44)
FRACTIONAL SHORTENING: 44 % (ref 28–44)
FRACTIONAL SHORTENING: 44 % (ref 28–44)
G6PD RBC-CCNT: 17.5 U/G HGB (ref 7–20.5)
GIANT PLATELETS BLD QL SMEAR: PRESENT
GLOBULIN SER-MCNC: 2.5 GM/DL (ref 2.4–3.5)
GLOBULIN SER-MCNC: 2.6 GM/DL (ref 2.4–3.5)
GLOBULIN SER-MCNC: 2.7 GM/DL (ref 2.4–3.5)
GLOBULIN SER-MCNC: 2.8 GM/DL (ref 2.4–3.5)
GLOBULIN SER-MCNC: 2.8 GM/DL (ref 2.4–3.5)
GLOBULIN SER-MCNC: 2.9 GM/DL (ref 2.4–3.5)
GLOBULIN SER-MCNC: 3 GM/DL (ref 2.4–3.5)
GLOBULIN SER-MCNC: 3.1 GM/DL (ref 2.4–3.5)
GLOBULIN SER-MCNC: 3.1 GM/DL (ref 2.4–3.5)
GLOBULIN SER-MCNC: 3.3 GM/DL (ref 2.4–3.5)
GLOBULIN SER-MCNC: 3.3 GM/DL (ref 2.4–3.5)
GLUCOSE SERPL-MCNC: 100 MG/DL (ref 70–110)
GLUCOSE SERPL-MCNC: 100 MG/DL (ref 74–100)
GLUCOSE SERPL-MCNC: 101 MG/DL (ref 70–110)
GLUCOSE SERPL-MCNC: 101 MG/DL (ref 70–110)
GLUCOSE SERPL-MCNC: 101 MG/DL (ref 74–100)
GLUCOSE SERPL-MCNC: 102 MG/DL (ref 74–100)
GLUCOSE SERPL-MCNC: 102 MG/DL (ref 74–100)
GLUCOSE SERPL-MCNC: 103 MG/DL (ref 70–110)
GLUCOSE SERPL-MCNC: 103 MG/DL (ref 70–110)
GLUCOSE SERPL-MCNC: 104 MG/DL (ref 70–110)
GLUCOSE SERPL-MCNC: 104 MG/DL (ref 70–110)
GLUCOSE SERPL-MCNC: 106 MG/DL (ref 70–110)
GLUCOSE SERPL-MCNC: 107 MG/DL (ref 70–110)
GLUCOSE SERPL-MCNC: 108 MG/DL (ref 74–100)
GLUCOSE SERPL-MCNC: 109 MG/DL (ref 70–110)
GLUCOSE SERPL-MCNC: 109 MG/DL (ref 70–110)
GLUCOSE SERPL-MCNC: 110 MG/DL (ref 70–110)
GLUCOSE SERPL-MCNC: 111 MG/DL (ref 70–110)
GLUCOSE SERPL-MCNC: 112 MG/DL (ref 70–110)
GLUCOSE SERPL-MCNC: 114 MG/DL (ref 70–110)
GLUCOSE SERPL-MCNC: 114 MG/DL (ref 74–100)
GLUCOSE SERPL-MCNC: 115 MG/DL (ref 70–110)
GLUCOSE SERPL-MCNC: 118 MG/DL (ref 70–110)
GLUCOSE SERPL-MCNC: 119 MG/DL (ref 70–110)
GLUCOSE SERPL-MCNC: 120 MG/DL (ref 70–110)
GLUCOSE SERPL-MCNC: 120 MG/DL (ref 74–100)
GLUCOSE SERPL-MCNC: 121 MG/DL (ref 70–110)
GLUCOSE SERPL-MCNC: 121 MG/DL (ref 70–110)
GLUCOSE SERPL-MCNC: 124 MG/DL (ref 70–110)
GLUCOSE SERPL-MCNC: 126 MG/DL (ref 70–110)
GLUCOSE SERPL-MCNC: 126 MG/DL (ref 74–100)
GLUCOSE SERPL-MCNC: 128 MG/DL (ref 70–110)
GLUCOSE SERPL-MCNC: 130 MG/DL (ref 70–110)
GLUCOSE SERPL-MCNC: 130 MG/DL (ref 70–110)
GLUCOSE SERPL-MCNC: 131 MG/DL (ref 70–110)
GLUCOSE SERPL-MCNC: 133 MG/DL (ref 70–110)
GLUCOSE SERPL-MCNC: 135 MG/DL (ref 70–110)
GLUCOSE SERPL-MCNC: 136 MG/DL (ref 70–110)
GLUCOSE SERPL-MCNC: 144 MG/DL (ref 70–110)
GLUCOSE SERPL-MCNC: 146 MG/DL (ref 70–110)
GLUCOSE SERPL-MCNC: 147 MG/DL (ref 74–100)
GLUCOSE SERPL-MCNC: 152 MG/DL (ref 70–110)
GLUCOSE SERPL-MCNC: 152 MG/DL (ref 70–110)
GLUCOSE SERPL-MCNC: 153 MG/DL (ref 70–110)
GLUCOSE SERPL-MCNC: 157 MG/DL (ref 70–110)
GLUCOSE SERPL-MCNC: 206 MG/DL (ref 70–110)
GLUCOSE SERPL-MCNC: 208 MG/DL (ref 70–110)
GLUCOSE SERPL-MCNC: 241 MG/DL (ref 70–110)
GLUCOSE SERPL-MCNC: 266 MG/DL (ref 70–110)
GLUCOSE SERPL-MCNC: 271 MG/DL (ref 70–110)
GLUCOSE SERPL-MCNC: 271 MG/DL (ref 70–110)
GLUCOSE SERPL-MCNC: 64 MG/DL (ref 70–110)
GLUCOSE SERPL-MCNC: 82 MG/DL (ref 74–100)
GLUCOSE SERPL-MCNC: 88 MG/DL (ref 70–110)
GLUCOSE SERPL-MCNC: 90 MG/DL (ref 74–100)
GLUCOSE SERPL-MCNC: 90 MG/DL (ref 74–100)
GLUCOSE SERPL-MCNC: 91 MG/DL (ref 70–110)
GLUCOSE SERPL-MCNC: 91 MG/DL (ref 74–100)
GLUCOSE SERPL-MCNC: 91 MG/DL (ref 74–100)
GLUCOSE SERPL-MCNC: 92 MG/DL (ref 74–100)
GLUCOSE SERPL-MCNC: 92 MG/DL (ref 74–100)
GLUCOSE SERPL-MCNC: 93 MG/DL (ref 74–100)
GLUCOSE SERPL-MCNC: 94 MG/DL (ref 70–110)
GLUCOSE SERPL-MCNC: 94 MG/DL (ref 74–100)
GLUCOSE SERPL-MCNC: 95 MG/DL (ref 70–110)
GLUCOSE SERPL-MCNC: 96 MG/DL (ref 74–100)
GLUCOSE SERPL-MCNC: 97 MG/DL (ref 70–110)
GLUCOSE SERPL-MCNC: 97 MG/DL (ref 70–110)
GLUCOSE SERPL-MCNC: 98 MG/DL (ref 70–110)
GLUCOSE SERPL-MCNC: 99 MG/DL (ref 70–110)
GLUCOSE SERPL-MCNC: 99 MG/DL (ref 70–110)
GLUCOSE SERPL-MCNC: 99 MG/DL (ref 74–100)
GLUCOSE SERPL-MCNC: 99 MG/DL (ref 74–100)
GLUCOSE UR QL STRIP: NEGATIVE
GRANULOCYTES NFR BLD MANUAL: 32 % (ref 43–75)
GRANULOCYTES NFR BLD MANUAL: 34 % (ref 43–75)
GRANULOCYTES NFR BLD MANUAL: 38 % (ref 43–75)
GRANULOCYTES NFR BLD MANUAL: 39 % (ref 43–75)
GRANULOCYTES NFR BLD MANUAL: 40 % (ref 43–75)
GRANULOCYTES NFR BLD MANUAL: 40 % (ref 43–75)
GRANULOCYTES NFR BLD MANUAL: 42 % (ref 43–75)
GRANULOCYTES NFR BLD MANUAL: 44 % (ref 43–75)
GRANULOCYTES NFR BLD MANUAL: 46 % (ref 43–75)
GRANULOCYTES NFR BLD MANUAL: 46 % (ref 43–75)
GRANULOCYTES NFR BLD MANUAL: 47 % (ref 43–75)
GRANULOCYTES NFR BLD MANUAL: 47 % (ref 43–75)
GRANULOCYTES NFR BLD MANUAL: 48 % (ref 43–75)
GRANULOCYTES NFR BLD MANUAL: 51 % (ref 43–75)
GRANULOCYTES NFR BLD MANUAL: 52 % (ref 43–75)
GRANULOCYTES NFR BLD MANUAL: 53 % (ref 43–75)
GRANULOCYTES NFR BLD MANUAL: 54 % (ref 43–75)
GRANULOCYTES NFR BLD MANUAL: 56 % (ref 43–75)
GRANULOCYTES NFR BLD MANUAL: 57 % (ref 43–75)
GRANULOCYTES NFR BLD MANUAL: 60 % (ref 43–75)
GRANULOCYTES NFR BLD MANUAL: 62 % (ref 43–75)
GRANULOCYTES NFR BLD MANUAL: 68 % (ref 43–75)
GROSS: NORMAL
HCO3 UR-SCNC: 28.7 MMOL/L (ref 24–28)
HCO3 UR-SCNC: 30.7 MMOL/L (ref 24–28)
HCO3 UR-SCNC: 34.9 MMOL/L (ref 24–28)
HCO3 UR-SCNC: 35.4 MMOL/L (ref 24–28)
HCO3 UR-SCNC: 36.6 MMOL/L (ref 24–28)
HCO3 UR-SCNC: 36.7 MMOL/L (ref 24–28)
HCO3 UR-SCNC: 36.7 MMOL/L (ref 24–28)
HCO3 UR-SCNC: 37.7 MMOL/L (ref 24–28)
HCO3 UR-SCNC: 38.5 MMOL/L (ref 24–28)
HCO3 UR-SCNC: 38.8 MMOL/L (ref 24–28)
HCO3 UR-SCNC: 39.1 MMOL/L (ref 24–28)
HCO3 UR-SCNC: 39.3 MMOL/L (ref 24–28)
HCO3 UR-SCNC: 39.8 MMOL/L (ref 24–28)
HCO3 UR-SCNC: 40.8 MMOL/L (ref 24–28)
HCO3 UR-SCNC: 43.9 MMOL/L (ref 24–28)
HCT VFR BLD AUTO: 17.5 % (ref 40–54)
HCT VFR BLD AUTO: 18.7 % (ref 40–54)
HCT VFR BLD AUTO: 19.1 % (ref 40–54)
HCT VFR BLD AUTO: 19.7 % (ref 40–54)
HCT VFR BLD AUTO: 20.2 % (ref 40–54)
HCT VFR BLD AUTO: 20.4 % (ref 40–54)
HCT VFR BLD AUTO: 20.6 % (ref 40–54)
HCT VFR BLD AUTO: 20.6 % (ref 40–54)
HCT VFR BLD AUTO: 20.7 % (ref 40–54)
HCT VFR BLD AUTO: 20.8 % (ref 40–54)
HCT VFR BLD AUTO: 21 % (ref 40–54)
HCT VFR BLD AUTO: 21.2 % (ref 40–54)
HCT VFR BLD AUTO: 21.3 % (ref 40–54)
HCT VFR BLD AUTO: 21.6 % (ref 40–54)
HCT VFR BLD AUTO: 21.7 % (ref 40–54)
HCT VFR BLD AUTO: 21.8 % (ref 40–54)
HCT VFR BLD AUTO: 23.4 % (ref 40–54)
HCT VFR BLD AUTO: 23.6 % (ref 40–54)
HCT VFR BLD AUTO: 23.7 % (ref 40–51)
HCT VFR BLD AUTO: 24.7 % (ref 40–54)
HCT VFR BLD AUTO: 24.9 % (ref 40–54)
HCT VFR BLD AUTO: 25.3 % (ref 40–51)
HCT VFR BLD AUTO: 25.6 % (ref 40–54)
HCT VFR BLD AUTO: 26.1 % (ref 40–54)
HCT VFR BLD AUTO: 26.5 % (ref 40–54)
HCT VFR BLD AUTO: 26.6 % (ref 40–54)
HCT VFR BLD AUTO: 26.9 % (ref 40–51)
HCT VFR BLD AUTO: 27 % (ref 40–54)
HCT VFR BLD AUTO: 27 % (ref 40–54)
HCT VFR BLD AUTO: 27.1 % (ref 40–51)
HCT VFR BLD AUTO: 27.3 % (ref 40–54)
HCT VFR BLD AUTO: 27.3 % (ref 40–54)
HCT VFR BLD AUTO: 27.5 % (ref 40–54)
HCT VFR BLD AUTO: 27.7 % (ref 40–51)
HCT VFR BLD AUTO: 27.8 % (ref 40–51)
HCT VFR BLD AUTO: 27.8 % (ref 40–51)
HCT VFR BLD AUTO: 27.8 % (ref 40–54)
HCT VFR BLD AUTO: 28.1 % (ref 40–54)
HCT VFR BLD AUTO: 28.3 % (ref 40–54)
HCT VFR BLD AUTO: 28.5 % (ref 40–54)
HCT VFR BLD AUTO: 28.6 % (ref 40–54)
HCT VFR BLD AUTO: 28.7 % (ref 40–51)
HCT VFR BLD AUTO: 28.7 % (ref 40–54)
HCT VFR BLD AUTO: 29.1 % (ref 40–54)
HCT VFR BLD AUTO: 29.5 % (ref 40–51)
HCT VFR BLD AUTO: 29.6 % (ref 40–51)
HCT VFR BLD AUTO: 29.8 % (ref 40–51)
HCT VFR BLD AUTO: 30.1 % (ref 40–51)
HCT VFR BLD AUTO: 30.2 % (ref 40–51)
HCT VFR BLD AUTO: 30.3 % (ref 40–51)
HCT VFR BLD AUTO: 30.5 % (ref 40–51)
HCT VFR BLD AUTO: 31.3 % (ref 40–51)
HCT VFR BLD AUTO: 31.4 % (ref 40–51)
HCT VFR BLD AUTO: 31.6 % (ref 40–51)
HCT VFR BLD AUTO: 31.8 % (ref 40–51)
HCT VFR BLD AUTO: 31.8 % (ref 40–51)
HCT VFR BLD AUTO: 31.9 % (ref 40–51)
HCT VFR BLD AUTO: 32.2 % (ref 40–51)
HCT VFR BLD AUTO: 32.6 % (ref 40–51)
HCT VFR BLD AUTO: 32.6 % (ref 40–51)
HCT VFR BLD AUTO: 32.8 % (ref 40–51)
HCT VFR BLD AUTO: 33 % (ref 40–51)
HCT VFR BLD AUTO: 34.9 % (ref 40–51)
HCT VFR BLD CALC: 19 %PCV (ref 36–54)
HCT VFR BLD CALC: 20 %PCV (ref 36–54)
HCT VFR BLD CALC: 20 %PCV (ref 36–54)
HCT VFR BLD CALC: 21 %PCV (ref 36–54)
HCT VFR BLD CALC: 22 %PCV (ref 36–54)
HCT VFR BLD CALC: 23 %PCV (ref 36–54)
HCT VFR BLD CALC: 23 %PCV (ref 36–54)
HCT VFR BLD CALC: 24 %PCV (ref 36–54)
HCT VFR BLD CALC: 25 %PCV (ref 36–54)
HCT VFR BLD CALC: 25 %PCV (ref 36–54)
HGB BLD-MCNC: 10 GM/DL (ref 13.5–17.5)
HGB BLD-MCNC: 10 GM/DL (ref 13.5–17.5)
HGB BLD-MCNC: 10.5 GM/DL (ref 13.5–17.5)
HGB BLD-MCNC: 5.8 G/DL (ref 14–18)
HGB BLD-MCNC: 6.1 G/DL (ref 14–18)
HGB BLD-MCNC: 6.2 G/DL (ref 14–18)
HGB BLD-MCNC: 6.2 G/DL (ref 14–18)
HGB BLD-MCNC: 6.3 G/DL (ref 14–18)
HGB BLD-MCNC: 6.5 G/DL (ref 14–18)
HGB BLD-MCNC: 6.5 G/DL (ref 14–18)
HGB BLD-MCNC: 6.6 G/DL (ref 14–18)
HGB BLD-MCNC: 6.7 G/DL (ref 14–18)
HGB BLD-MCNC: 6.8 G/DL (ref 14–18)
HGB BLD-MCNC: 7 G/DL (ref 14–18)
HGB BLD-MCNC: 7.1 G/DL (ref 14–18)
HGB BLD-MCNC: 7.1 GM/DL (ref 13.5–17.5)
HGB BLD-MCNC: 7.2 G/DL (ref 14–18)
HGB BLD-MCNC: 7.2 G/DL (ref 14–18)
HGB BLD-MCNC: 7.3 G/DL (ref 14–18)
HGB BLD-MCNC: 7.3 G/DL (ref 14–18)
HGB BLD-MCNC: 7.4 G/DL (ref 14–18)
HGB BLD-MCNC: 7.5 G/DL (ref 14–18)
HGB BLD-MCNC: 7.5 G/DL (ref 14–18)
HGB BLD-MCNC: 7.5 GM/DL (ref 13.5–17.5)
HGB BLD-MCNC: 7.6 G/DL (ref 14–18)
HGB BLD-MCNC: 7.6 G/DL (ref 14–18)
HGB BLD-MCNC: 8 G/DL (ref 14–18)
HGB BLD-MCNC: 8.1 G/DL (ref 14–18)
HGB BLD-MCNC: 8.2 G/DL (ref 14–18)
HGB BLD-MCNC: 8.2 G/DL (ref 14–18)
HGB BLD-MCNC: 8.2 GM/DL (ref 13.5–17.5)
HGB BLD-MCNC: 8.3 G/DL (ref 14–18)
HGB BLD-MCNC: 8.3 GM/DL (ref 13.5–17.5)
HGB BLD-MCNC: 8.5 G/DL (ref 14–18)
HGB BLD-MCNC: 8.5 G/DL (ref 14–18)
HGB BLD-MCNC: 8.5 GM/DL (ref 13.5–17.5)
HGB BLD-MCNC: 8.6 G/DL (ref 14–18)
HGB BLD-MCNC: 8.6 G/DL (ref 14–18)
HGB BLD-MCNC: 8.6 GM/DL (ref 13.5–17.5)
HGB BLD-MCNC: 8.6 GM/DL (ref 13.5–17.5)
HGB BLD-MCNC: 8.7 G/DL (ref 14–18)
HGB BLD-MCNC: 8.7 G/DL (ref 14–18)
HGB BLD-MCNC: 8.8 G/DL (ref 14–18)
HGB BLD-MCNC: 8.8 G/DL (ref 14–18)
HGB BLD-MCNC: 9 GM/DL (ref 13.5–17.5)
HGB BLD-MCNC: 9.1 GM/DL (ref 13.5–17.5)
HGB BLD-MCNC: 9.4 GM/DL (ref 13.5–17.5)
HGB BLD-MCNC: 9.4 GM/DL (ref 13.5–17.5)
HGB BLD-MCNC: 9.5 GM/DL (ref 13.5–17.5)
HGB BLD-MCNC: 9.6 GM/DL (ref 13.5–17.5)
HGB BLD-MCNC: 9.8 GM/DL (ref 13.5–17.5)
HGB BLD-MCNC: 9.9 GM/DL (ref 13.5–17.5)
HGB UR QL STRIP: ABNORMAL
HYALINE CASTS UR QL AUTO: 4 /LPF
HYPOCHROMIA BLD QL SMEAR: ABNORMAL
HYPOCHROMIA BLD QL SMEAR: NORMAL
IMM GRANULOCYTES # BLD AUTO: 0.4 10*3/UL
IMM GRANULOCYTES # BLD AUTO: ABNORMAL K/UL (ref 0–0.04)
IMM GRANULOCYTES NFR BLD AUTO: 15 %
IMM GRANULOCYTES NFR BLD AUTO: ABNORMAL % (ref 0–0.5)
INR PPP: 1.2 (ref 0.8–1.2)
INR PPP: 1.3 (ref 0.8–1.2)
INR PPP: 1.3 (ref 0.8–1.2)
INR PPP: 1.4 (ref 0.8–1.2)
INR PPP: 1.4 (ref 0.8–1.2)
INR PPP: 1.5 (ref 0.8–1.2)
INR PPP: 2.3 (ref 0.8–1.2)
INR PPP: 2.7 (ref 0.8–1.2)
INTERVENTRICULAR SEPTUM: 0.9 CM (ref 0.6–1.1)
INTERVENTRICULAR SEPTUM: 0.99 CM (ref 0.6–1.1)
INTERVENTRICULAR SEPTUM: 1.05 CM (ref 0.6–1.1)
IRON SATN MFR SERPL: 20 % (ref 20–50)
IRON SATN MFR SERPL: 32 % (ref 20–50)
IRON SATN MFR SERPL: 69 % (ref 20–50)
IRON SERPL-MCNC: 162 UG/DL (ref 65–175)
IRON SERPL-MCNC: 28 UG/DL (ref 45–160)
IRON SERPL-MCNC: 54 UG/DL (ref 65–175)
IRON SERPL-MCNC: 73 UG/DL (ref 65–175)
IVRT: 102.76 MSEC
IVRT: 62.8 MSEC
KARYOTYP BLD/T: NORMAL
KARYOTYP BLD/T: NORMAL
KARYOTYP MAR: NORMAL
KETONES UR QL STRIP: NEGATIVE
LA MAJOR: 6.31 CM
LA MAJOR: 6.8 CM
LA MAJOR: 7.93 CM
LA MINOR: 6.61 CM
LA MINOR: 6.8 CM
LA MINOR: 7.86 CM
LA WIDTH: 4.29 CM
LA WIDTH: 4.88 CM
LA WIDTH: 5.13 CM
LACTATE SERPL-SCNC: 4.8 MMOL/L (ref 0.5–2.2)
LDH SERPL L TO P-CCNC: 0.35 MMOL/L (ref 0.36–1.25)
LDH SERPL L TO P-CCNC: 0.37 MMOL/L (ref 0.36–1.25)
LDH SERPL L TO P-CCNC: 0.42 MMOL/L (ref 0.36–1.25)
LDH SERPL L TO P-CCNC: 0.47 MMOL/L (ref 0.36–1.25)
LDH SERPL L TO P-CCNC: 0.5 MMOL/L (ref 0.36–1.25)
LDH SERPL L TO P-CCNC: 0.53 MMOL/L (ref 0.36–1.25)
LDH SERPL L TO P-CCNC: 0.54 MMOL/L (ref 0.36–1.25)
LDH SERPL L TO P-CCNC: 0.59 MMOL/L (ref 0.36–1.25)
LDH SERPL L TO P-CCNC: 0.62 MMOL/L (ref 0.36–1.25)
LDH SERPL L TO P-CCNC: 0.82 MMOL/L (ref 0.36–1.25)
LDH SERPL L TO P-CCNC: 4.53 MMOL/L (ref 0.36–1.25)
LDH SERPL L TO P-CCNC: 400 U/L (ref 110–260)
LDH SERPL L TO P-CCNC: 556 U/L (ref 110–260)
LDH SERPL L TO P-CCNC: 565 U/L (ref 110–260)
LDH SERPL L TO P-CCNC: 612 U/L (ref 110–260)
LDH SERPL L TO P-CCNC: 612 U/L (ref 110–260)
LDH SERPL L TO P-CCNC: 653 U/L (ref 110–260)
LDH SERPL L TO P-CCNC: 684 U/L (ref 110–260)
LDH SERPL L TO P-CCNC: 698 U/L (ref 110–260)
LDH SERPL L TO P-CCNC: 738 U/L (ref 110–260)
LDH SERPL L TO P-CCNC: 911 U/L (ref 110–260)
LDH SERPL L TO P-CCNC: <0.3 MMOL/L (ref 0.36–1.25)
LEFT ATRIUM SIZE: 3.99 CM
LEFT ATRIUM SIZE: 4.14 CM
LEFT ATRIUM SIZE: 4.74 CM
LEFT ATRIUM VOLUME INDEX MOD: 44.6 ML/M2
LEFT ATRIUM VOLUME INDEX MOD: 44.6 ML/M2
LEFT ATRIUM VOLUME INDEX MOD: 56.8 ML/M2
LEFT ATRIUM VOLUME INDEX: 37.7 ML/M2
LEFT ATRIUM VOLUME INDEX: 49.7 ML/M2
LEFT ATRIUM VOLUME INDEX: 62.8 ML/M2
LEFT ATRIUM VOLUME MOD: 110.07 CM3
LEFT ATRIUM VOLUME MOD: 111 CM3
LEFT ATRIUM VOLUME MOD: 140.36 CM3
LEFT ATRIUM VOLUME: 122.76 CM3
LEFT ATRIUM VOLUME: 155.22 CM3
LEFT ATRIUM VOLUME: 93.94 CM3
LEFT INTERNAL DIMENSION IN SYSTOLE: 3.02 CM (ref 2.1–4)
LEFT INTERNAL DIMENSION IN SYSTOLE: 3.57 CM (ref 2.1–4)
LEFT INTERNAL DIMENSION IN SYSTOLE: 3.92 CM (ref 2.1–4)
LEFT VENTRICLE DIASTOLIC VOLUME INDEX: 56.01 ML/M2
LEFT VENTRICLE DIASTOLIC VOLUME INDEX: 73.33 ML/M2
LEFT VENTRICLE DIASTOLIC VOLUME INDEX: 82.62 ML/M2
LEFT VENTRICLE DIASTOLIC VOLUME: 139.47 ML
LEFT VENTRICLE DIASTOLIC VOLUME: 181.13 ML
LEFT VENTRICLE DIASTOLIC VOLUME: 204.07 ML
LEFT VENTRICLE MASS INDEX: 119 G/M2
LEFT VENTRICLE MASS INDEX: 75 G/M2
LEFT VENTRICLE MASS INDEX: 99 G/M2
LEFT VENTRICLE SYSTOLIC VOLUME INDEX: 14.3 ML/M2
LEFT VENTRICLE SYSTOLIC VOLUME INDEX: 21.6 ML/M2
LEFT VENTRICLE SYSTOLIC VOLUME INDEX: 27 ML/M2
LEFT VENTRICLE SYSTOLIC VOLUME: 35.68 ML
LEFT VENTRICLE SYSTOLIC VOLUME: 53.32 ML
LEFT VENTRICLE SYSTOLIC VOLUME: 66.58 ML
LEFT VENTRICULAR INTERNAL DIMENSION IN DIASTOLE: 5.37 CM (ref 3.5–6)
LEFT VENTRICULAR INTERNAL DIMENSION IN DIASTOLE: 6.02 CM (ref 3.5–6)
LEFT VENTRICULAR INTERNAL DIMENSION IN DIASTOLE: 6.34 CM (ref 3.5–6)
LEFT VENTRICULAR MASS: 186.2 G
LEFT VENTRICULAR MASS: 243.47 G
LEFT VENTRICULAR MASS: 294.13 G
LEUKOCYTE ESTERASE UR QL STRIP: NEGATIVE
LV LATERAL E/E' RATIO: 10.07 M/S
LV LATERAL E/E' RATIO: 11.11 M/S
LV LATERAL E/E' RATIO: 15.8 M/S
LV SEPTAL E/E' RATIO: 12.82 M/S
LV SEPTAL E/E' RATIO: 16.67 M/S
LV SEPTAL E/E' RATIO: 22.57 M/S
LYMPHOCYTES # BLD AUTO: 0.8 X10(3)/MCL (ref 0.6–4.6)
LYMPHOCYTES # BLD AUTO: ABNORMAL K/UL (ref 1–4.8)
LYMPHOCYTES NFR BLD AUTO: 30 %
LYMPHOCYTES NFR BLD MANUAL: 1 %
LYMPHOCYTES NFR BLD MANUAL: 10 %
LYMPHOCYTES NFR BLD MANUAL: 10 % (ref 20.5–51.1)
LYMPHOCYTES NFR BLD MANUAL: 14 % (ref 20.5–51.1)
LYMPHOCYTES NFR BLD MANUAL: 15 % (ref 20.5–51.1)
LYMPHOCYTES NFR BLD MANUAL: 16 % (ref 20.5–51.1)
LYMPHOCYTES NFR BLD MANUAL: 18 % (ref 20.5–51.1)
LYMPHOCYTES NFR BLD MANUAL: 18 % (ref 20.5–51.1)
LYMPHOCYTES NFR BLD MANUAL: 2 %
LYMPHOCYTES NFR BLD MANUAL: 20 % (ref 20.5–51.1)
LYMPHOCYTES NFR BLD MANUAL: 20 % (ref 20.5–51.1)
LYMPHOCYTES NFR BLD MANUAL: 21 % (ref 20.5–51.1)
LYMPHOCYTES NFR BLD MANUAL: 22 % (ref 20.5–51.1)
LYMPHOCYTES NFR BLD MANUAL: 23 % (ref 20.5–51.1)
LYMPHOCYTES NFR BLD MANUAL: 24 % (ref 20.5–51.1)
LYMPHOCYTES NFR BLD MANUAL: 24 % (ref 20.5–51.1)
LYMPHOCYTES NFR BLD MANUAL: 26 % (ref 20.5–51.1)
LYMPHOCYTES NFR BLD MANUAL: 26 % (ref 20.5–51.1)
LYMPHOCYTES NFR BLD MANUAL: 28 % (ref 20.5–51.1)
LYMPHOCYTES NFR BLD MANUAL: 29 % (ref 20.5–51.1)
LYMPHOCYTES NFR BLD MANUAL: 3 %
LYMPHOCYTES NFR BLD MANUAL: 3 %
LYMPHOCYTES NFR BLD MANUAL: 30 % (ref 20.5–51.1)
LYMPHOCYTES NFR BLD MANUAL: 31 % (ref 20.5–51.1)
LYMPHOCYTES NFR BLD MANUAL: 32 % (ref 20.5–51.1)
LYMPHOCYTES NFR BLD MANUAL: 34 % (ref 20.5–51.1)
LYMPHOCYTES NFR BLD MANUAL: 36 % (ref 20.5–51.1)
LYMPHOCYTES NFR BLD MANUAL: 38 % (ref 20.5–51.1)
LYMPHOCYTES NFR BLD MANUAL: 39 % (ref 20.5–51.1)
LYMPHOCYTES NFR BLD MANUAL: 40 % (ref 20.5–51.1)
LYMPHOCYTES NFR BLD MANUAL: 48 % (ref 20.5–51.1)
LYMPHOCYTES NFR BLD: 1 % (ref 18–48)
LYMPHOCYTES NFR BLD: 10 % (ref 18–48)
LYMPHOCYTES NFR BLD: 11 % (ref 18–48)
LYMPHOCYTES NFR BLD: 2 % (ref 18–48)
LYMPHOCYTES NFR BLD: 2.5 % (ref 18–48)
LYMPHOCYTES NFR BLD: 3 % (ref 18–48)
LYMPHOCYTES NFR BLD: 3.5 % (ref 18–48)
LYMPHOCYTES NFR BLD: 3.5 % (ref 18–48)
LYMPHOCYTES NFR BLD: 4 % (ref 18–48)
LYMPHOCYTES NFR BLD: 5 % (ref 18–48)
LYMPHOCYTES NFR BLD: 5 % (ref 18–48)
LYMPHOCYTES NFR BLD: 5.5 % (ref 18–48)
LYMPHOCYTES NFR BLD: 6 % (ref 18–48)
LYMPHOCYTES NFR BLD: 7 % (ref 18–48)
LYMPHOCYTES NFR BLD: 8 % (ref 18–48)
LYMPHOCYTES NFR BLD: 9 % (ref 18–48)
Lab: NORMAL
MACROCYTES BLD QL SMEAR: ABNORMAL
MACROCYTES BLD QL SMEAR: NORMAL
MACROCYTES BLD QL SMEAR: NORMAL
MAGNESIUM SERPL-MCNC: 1.3 MG/DL (ref 1.6–2.6)
MAGNESIUM SERPL-MCNC: 1.5 MG/DL (ref 1.6–2.6)
MAGNESIUM SERPL-MCNC: 1.6 MG/DL (ref 1.6–2.6)
MAGNESIUM SERPL-MCNC: 1.7 MG/DL (ref 1.6–2.6)
MAGNESIUM SERPL-MCNC: 1.8 MG/DL (ref 1.6–2.6)
MAGNESIUM SERPL-MCNC: 1.9 MG/DL (ref 1.6–2.6)
MAGNESIUM SERPL-MCNC: 1.9 MG/DL (ref 1.6–2.6)
MAGNESIUM SERPL-MCNC: 2 MG/DL (ref 1.6–2.6)
MAGNESIUM SERPL-MCNC: 2 MG/DL (ref 1.6–2.6)
MAGNESIUM SERPL-MCNC: 2.03 MG/DL (ref 1.6–2.6)
MAGNESIUM SERPL-MCNC: 2.1 MG/DL (ref 1.6–2.6)
MAGNESIUM SERPL-MCNC: 2.2 MG/DL (ref 1.6–2.6)
MAGNESIUM SERPL-MCNC: 2.3 MG/DL (ref 1.6–2.6)
MAGNESIUM SERPL-MCNC: 2.3 MG/DL (ref 1.6–2.6)
MAGNESIUM SERPL-MCNC: 2.4 MG/DL (ref 1.6–2.6)
MAGNESIUM SERPL-MCNC: 2.4 MG/DL (ref 1.6–2.6)
MAGNESIUM SERPL-MCNC: 2.5 MG/DL (ref 1.6–2.6)
MAGNESIUM SERPL-MCNC: 2.6 MG/DL (ref 1.6–2.6)
MCH RBC QN AUTO: 25.3 PG (ref 26–34)
MCH RBC QN AUTO: 25.5 PG (ref 26–34)
MCH RBC QN AUTO: 26 PG (ref 26–34)
MCH RBC QN AUTO: 26.1 PG (ref 26–34)
MCH RBC QN AUTO: 26.2 PG (ref 26–34)
MCH RBC QN AUTO: 26.4 PG (ref 26–34)
MCH RBC QN AUTO: 26.5 PG (ref 26–34)
MCH RBC QN AUTO: 26.5 PG (ref 26–34)
MCH RBC QN AUTO: 26.6 PG (ref 26–34)
MCH RBC QN AUTO: 26.7 PG (ref 26–34)
MCH RBC QN AUTO: 26.7 PG (ref 26–34)
MCH RBC QN AUTO: 26.8 PG (ref 26–34)
MCH RBC QN AUTO: 26.8 PG (ref 26–34)
MCH RBC QN AUTO: 26.9 PG (ref 26–34)
MCH RBC QN AUTO: 27 PG (ref 26–34)
MCH RBC QN AUTO: 27 PG (ref 26–34)
MCH RBC QN AUTO: 27.1 PG (ref 26–34)
MCH RBC QN AUTO: 27.2 PG (ref 26–34)
MCH RBC QN AUTO: 27.2 PG (ref 27–31)
MCH RBC QN AUTO: 27.4 PG (ref 27–31)
MCH RBC QN AUTO: 27.4 PG (ref 27–31)
MCH RBC QN AUTO: 27.5 PG (ref 26–34)
MCH RBC QN AUTO: 27.6 PG (ref 27–31)
MCH RBC QN AUTO: 27.6 PG (ref 27–31)
MCH RBC QN AUTO: 27.7 PG (ref 27–31)
MCH RBC QN AUTO: 27.8 PG (ref 27–31)
MCH RBC QN AUTO: 27.8 PG (ref 27–31)
MCH RBC QN AUTO: 28 PG (ref 27–31)
MCH RBC QN AUTO: 28 PG (ref 27–31)
MCH RBC QN AUTO: 28.1 PG (ref 27–31)
MCH RBC QN AUTO: 28.2 PG (ref 27–31)
MCH RBC QN AUTO: 28.3 PG (ref 27–31)
MCH RBC QN AUTO: 28.4 PG (ref 27–31)
MCH RBC QN AUTO: 28.5 PG (ref 27–31)
MCH RBC QN AUTO: 28.6 PG (ref 27–31)
MCH RBC QN AUTO: 28.7 PG (ref 27–31)
MCH RBC QN AUTO: 28.7 PG (ref 27–31)
MCH RBC QN AUTO: 28.9 PG (ref 27–31)
MCH RBC QN AUTO: 29 PG (ref 27–31)
MCH RBC QN AUTO: 29.1 PG (ref 27–31)
MCH RBC QN AUTO: 29.3 PG (ref 27–31)
MCH RBC QN AUTO: 29.4 PG (ref 27–31)
MCH RBC QN AUTO: 29.4 PG (ref 27–31)
MCH RBC QN AUTO: 29.5 PG (ref 27–31)
MCH RBC QN AUTO: 29.7 PG (ref 27–31)
MCH RBC QN AUTO: 29.9 PG (ref 27–31)
MCH RBC QN AUTO: 30 PG (ref 27–31)
MCH RBC QN AUTO: 30.4 PG (ref 27–31)
MCH RBC QN AUTO: 30.7 PG (ref 27–31)
MCHC RBC AUTO-ENTMCNC: 28.8 GM/DL (ref 31–37)
MCHC RBC AUTO-ENTMCNC: 29.1 G/DL (ref 32–36)
MCHC RBC AUTO-ENTMCNC: 29.5 G/DL (ref 32–36)
MCHC RBC AUTO-ENTMCNC: 29.5 GM/DL (ref 31–37)
MCHC RBC AUTO-ENTMCNC: 29.6 GM/DL (ref 31–37)
MCHC RBC AUTO-ENTMCNC: 29.7 G/DL (ref 32–36)
MCHC RBC AUTO-ENTMCNC: 29.7 GM/DL (ref 31–37)
MCHC RBC AUTO-ENTMCNC: 29.8 GM/DL (ref 31–37)
MCHC RBC AUTO-ENTMCNC: 29.9 GM/DL (ref 31–37)
MCHC RBC AUTO-ENTMCNC: 29.9 GM/DL (ref 31–37)
MCHC RBC AUTO-ENTMCNC: 30 G/DL (ref 32–36)
MCHC RBC AUTO-ENTMCNC: 30 GM/DL (ref 31–37)
MCHC RBC AUTO-ENTMCNC: 30.1 G/DL (ref 32–36)
MCHC RBC AUTO-ENTMCNC: 30.1 GM/DL (ref 31–37)
MCHC RBC AUTO-ENTMCNC: 30.1 GM/DL (ref 31–37)
MCHC RBC AUTO-ENTMCNC: 30.2 G/DL (ref 32–36)
MCHC RBC AUTO-ENTMCNC: 30.2 G/DL (ref 32–36)
MCHC RBC AUTO-ENTMCNC: 30.2 GM/DL (ref 31–37)
MCHC RBC AUTO-ENTMCNC: 30.2 GM/DL (ref 31–37)
MCHC RBC AUTO-ENTMCNC: 30.3 GM/DL (ref 31–37)
MCHC RBC AUTO-ENTMCNC: 30.3 GM/DL (ref 31–37)
MCHC RBC AUTO-ENTMCNC: 30.4 G/DL (ref 32–36)
MCHC RBC AUTO-ENTMCNC: 30.4 GM/DL (ref 31–37)
MCHC RBC AUTO-ENTMCNC: 30.4 GM/DL (ref 31–37)
MCHC RBC AUTO-ENTMCNC: 30.5 G/DL (ref 32–36)
MCHC RBC AUTO-ENTMCNC: 30.5 G/DL (ref 32–36)
MCHC RBC AUTO-ENTMCNC: 30.5 GM/DL (ref 31–37)
MCHC RBC AUTO-ENTMCNC: 30.6 G/DL (ref 32–36)
MCHC RBC AUTO-ENTMCNC: 30.6 G/DL (ref 32–36)
MCHC RBC AUTO-ENTMCNC: 30.7 G/DL (ref 32–36)
MCHC RBC AUTO-ENTMCNC: 30.7 G/DL (ref 32–36)
MCHC RBC AUTO-ENTMCNC: 30.7 GM/DL (ref 31–37)
MCHC RBC AUTO-ENTMCNC: 30.8 GM/DL (ref 31–37)
MCHC RBC AUTO-ENTMCNC: 30.9 G/DL (ref 32–36)
MCHC RBC AUTO-ENTMCNC: 30.9 GM/DL (ref 31–37)
MCHC RBC AUTO-ENTMCNC: 31 GM/DL (ref 31–37)
MCHC RBC AUTO-ENTMCNC: 31.1 G/DL (ref 32–36)
MCHC RBC AUTO-ENTMCNC: 31.1 G/DL (ref 32–36)
MCHC RBC AUTO-ENTMCNC: 31.1 GM/DL (ref 31–37)
MCHC RBC AUTO-ENTMCNC: 31.2 G/DL (ref 32–36)
MCHC RBC AUTO-ENTMCNC: 31.2 GM/DL (ref 31–37)
MCHC RBC AUTO-ENTMCNC: 31.4 G/DL (ref 32–36)
MCHC RBC AUTO-ENTMCNC: 31.5 G/DL (ref 32–36)
MCHC RBC AUTO-ENTMCNC: 31.5 GM/DL (ref 31–37)
MCHC RBC AUTO-ENTMCNC: 31.6 G/DL (ref 32–36)
MCHC RBC AUTO-ENTMCNC: 31.6 GM/DL (ref 31–37)
MCHC RBC AUTO-ENTMCNC: 31.7 GM/DL (ref 31–37)
MCHC RBC AUTO-ENTMCNC: 31.9 G/DL (ref 32–36)
MCHC RBC AUTO-ENTMCNC: 31.9 G/DL (ref 32–36)
MCHC RBC AUTO-ENTMCNC: 32.4 G/DL (ref 32–36)
MCHC RBC AUTO-ENTMCNC: 32.5 G/DL (ref 32–36)
MCHC RBC AUTO-ENTMCNC: 32.5 G/DL (ref 32–36)
MCHC RBC AUTO-ENTMCNC: 32.6 G/DL (ref 32–36)
MCHC RBC AUTO-ENTMCNC: 32.6 GM/DL (ref 31–37)
MCHC RBC AUTO-ENTMCNC: 32.7 G/DL (ref 32–36)
MCHC RBC AUTO-ENTMCNC: 32.9 G/DL (ref 32–36)
MCHC RBC AUTO-ENTMCNC: 33 G/DL (ref 32–36)
MCHC RBC AUTO-ENTMCNC: 33 G/DL (ref 32–36)
MCHC RBC AUTO-ENTMCNC: 33.1 G/DL (ref 32–36)
MCHC RBC AUTO-ENTMCNC: 33.2 G/DL (ref 32–36)
MCHC RBC AUTO-ENTMCNC: 33.5 G/DL (ref 32–36)
MCHC RBC AUTO-ENTMCNC: 33.7 G/DL (ref 32–36)
MCV RBC AUTO: 82.5 FL (ref 80–100)
MCV RBC AUTO: 84.6 FL (ref 80–100)
MCV RBC AUTO: 85.2 FL (ref 80–100)
MCV RBC AUTO: 85.2 FL (ref 80–100)
MCV RBC AUTO: 85.3 FL (ref 80–100)
MCV RBC AUTO: 85.6 FL (ref 80–100)
MCV RBC AUTO: 85.9 FL (ref 80–100)
MCV RBC AUTO: 86 FL (ref 80–100)
MCV RBC AUTO: 86.9 FL (ref 80–100)
MCV RBC AUTO: 87.1 FL (ref 80–100)
MCV RBC AUTO: 87.4 FL (ref 80–100)
MCV RBC AUTO: 87.5 FL (ref 80–100)
MCV RBC AUTO: 87.6 FL (ref 80–100)
MCV RBC AUTO: 87.7 FL (ref 80–100)
MCV RBC AUTO: 88.5 FL (ref 80–100)
MCV RBC AUTO: 88.8 FL (ref 80–100)
MCV RBC AUTO: 89 FL (ref 82–98)
MCV RBC AUTO: 89.4 FL (ref 80–100)
MCV RBC AUTO: 89.4 FL (ref 80–100)
MCV RBC AUTO: 89.8 FL (ref 80–100)
MCV RBC AUTO: 89.9 FL (ref 80–100)
MCV RBC AUTO: 90 FL (ref 82–98)
MCV RBC AUTO: 90.2 FL (ref 80–100)
MCV RBC AUTO: 90.2 FL (ref 80–100)
MCV RBC AUTO: 90.4 FL (ref 80–100)
MCV RBC AUTO: 90.6 FL (ref 80–100)
MCV RBC AUTO: 90.7 FL (ref 80–100)
MCV RBC AUTO: 91 FL (ref 82–98)
MCV RBC AUTO: 91.8 FL (ref 80–100)
MCV RBC AUTO: 92 FL (ref 82–98)
MCV RBC AUTO: 92.5 FL (ref 80–100)
MCV RBC AUTO: 93 FL (ref 82–98)
MCV RBC AUTO: 94 FL (ref 82–98)
MCV RBC AUTO: 95 FL (ref 82–98)
MCV RBC AUTO: 96 FL (ref 82–98)
MCV RBC AUTO: 96 FL (ref 82–98)
METAMYELOCYTES NFR BLD MANUAL: 0 %
METAMYELOCYTES NFR BLD MANUAL: 0.5 %
METAMYELOCYTES NFR BLD MANUAL: 0.5 %
METAMYELOCYTES NFR BLD MANUAL: 1 %
METAMYELOCYTES NFR BLD MANUAL: 11 %
METAMYELOCYTES NFR BLD MANUAL: 14 %
METAMYELOCYTES NFR BLD MANUAL: 2 %
METAMYELOCYTES NFR BLD MANUAL: 3 %
METAMYELOCYTES NFR BLD MANUAL: 4 %
MICROCYTES BLD QL SMEAR: ABNORMAL
MICROCYTES BLD QL SMEAR: NORMAL
MICROCYTES BLD QL SMEAR: NORMAL
MICROSCOPIC COMMENT: ABNORMAL
MICROSCOPIC EXAM: NORMAL
MIN VOL: 12
MIN VOL: 13
MIN VOL: 13.2
MODE: ABNORMAL
MODE: NORMAL
MONOCYTES # BLD AUTO: 0.4 X10(3)/MCL (ref 0.1–1.3)
MONOCYTES # BLD AUTO: ABNORMAL K/UL (ref 0.3–1)
MONOCYTES NFR BLD AUTO: 16 %
MONOCYTES NFR BLD MANUAL: 10 % (ref 2–9)
MONOCYTES NFR BLD MANUAL: 11 % (ref 2–9)
MONOCYTES NFR BLD MANUAL: 11 % (ref 2–9)
MONOCYTES NFR BLD MANUAL: 12 % (ref 2–9)
MONOCYTES NFR BLD MANUAL: 14 % (ref 2–9)
MONOCYTES NFR BLD MANUAL: 14 % (ref 2–9)
MONOCYTES NFR BLD MANUAL: 16 % (ref 2–9)
MONOCYTES NFR BLD MANUAL: 16 % (ref 2–9)
MONOCYTES NFR BLD MANUAL: 17 % (ref 2–9)
MONOCYTES NFR BLD MANUAL: 18 % (ref 2–9)
MONOCYTES NFR BLD MANUAL: 18 % (ref 2–9)
MONOCYTES NFR BLD MANUAL: 19 % (ref 2–9)
MONOCYTES NFR BLD MANUAL: 2 % (ref 2–9)
MONOCYTES NFR BLD MANUAL: 22 % (ref 2–9)
MONOCYTES NFR BLD MANUAL: 3 % (ref 2–9)
MONOCYTES NFR BLD MANUAL: 6 % (ref 2–9)
MONOCYTES NFR BLD MANUAL: 8 % (ref 2–9)
MONOCYTES NFR BLD: 1 % (ref 4–15)
MONOCYTES NFR BLD: 10 % (ref 4–15)
MONOCYTES NFR BLD: 11 % (ref 4–15)
MONOCYTES NFR BLD: 11 % (ref 4–15)
MONOCYTES NFR BLD: 12 % (ref 4–15)
MONOCYTES NFR BLD: 13 % (ref 4–15)
MONOCYTES NFR BLD: 18 % (ref 4–15)
MONOCYTES NFR BLD: 19 % (ref 4–15)
MONOCYTES NFR BLD: 2 % (ref 4–15)
MONOCYTES NFR BLD: 25 % (ref 4–15)
MONOCYTES NFR BLD: 27 % (ref 4–15)
MONOCYTES NFR BLD: 3 % (ref 4–15)
MONOCYTES NFR BLD: 4 % (ref 4–15)
MONOCYTES NFR BLD: 5 % (ref 4–15)
MONOCYTES NFR BLD: 5.5 % (ref 4–15)
MONOCYTES NFR BLD: 5.5 % (ref 4–15)
MONOCYTES NFR BLD: 6 % (ref 4–15)
MONOCYTES NFR BLD: 7 % (ref 4–15)
MONOCYTES NFR BLD: 8 % (ref 4–15)
MONOCYTES NFR BLD: 9 % (ref 4–15)
MV PEAK A VEL: 1.11 M/S
MV PEAK A VEL: 1.22 M/S
MV PEAK A VEL: 1.29 M/S
MV PEAK E VEL: 1 M/S
MV PEAK E VEL: 1.41 M/S
MV PEAK E VEL: 1.58 M/S
MV STENOSIS PRESSURE HALF TIME: 50.67 MS
MV STENOSIS PRESSURE HALF TIME: 54.46 MS
MV STENOSIS PRESSURE HALF TIME: 71.19 MS
MV VALVE AREA P 1/2 METHOD: 3.09 CM2
MV VALVE AREA P 1/2 METHOD: 4.04 CM2
MV VALVE AREA P 1/2 METHOD: 4.34 CM2
MYELOCYTES NFR BLD MANUAL: 0 %
MYELOCYTES NFR BLD MANUAL: 0.5 %
MYELOCYTES NFR BLD MANUAL: 0.5 %
MYELOCYTES NFR BLD MANUAL: 1 %
MYELOCYTES NFR BLD MANUAL: 2 %
MYELOCYTES NFR BLD MANUAL: 3 %
MYELOCYTES NFR BLD MANUAL: 4 %
NEUTROPHILS # BLD AUTO: 0.99 X10(3)/MCL (ref 2.1–9.2)
NEUTROPHILS # BLD AUTO: 1.36 X10(3)/MCL (ref 2.1–9.2)
NEUTROPHILS # BLD AUTO: ABNORMAL K/UL (ref 1.8–7.7)
NEUTROPHILS NFR BLD AUTO: 37 %
NEUTROPHILS NFR BLD MANUAL: 37 % (ref 47–80)
NEUTROPHILS NFR BLD MANUAL: 42 % (ref 47–80)
NEUTROPHILS NFR BLD MANUAL: 52 % (ref 47–80)
NEUTROPHILS NFR BLD MANUAL: 59 % (ref 47–80)
NEUTROPHILS NFR BLD: 11 % (ref 38–73)
NEUTROPHILS NFR BLD: 19.5 % (ref 38–73)
NEUTROPHILS NFR BLD: 21 % (ref 38–73)
NEUTROPHILS NFR BLD: 25 % (ref 38–73)
NEUTROPHILS NFR BLD: 26 % (ref 38–73)
NEUTROPHILS NFR BLD: 29 % (ref 38–73)
NEUTROPHILS NFR BLD: 29 % (ref 38–73)
NEUTROPHILS NFR BLD: 30 % (ref 38–73)
NEUTROPHILS NFR BLD: 32 % (ref 38–73)
NEUTROPHILS NFR BLD: 33 % (ref 38–73)
NEUTROPHILS NFR BLD: 34 % (ref 38–73)
NEUTROPHILS NFR BLD: 35 % (ref 38–73)
NEUTROPHILS NFR BLD: 35 % (ref 38–73)
NEUTROPHILS NFR BLD: 36 % (ref 38–73)
NEUTROPHILS NFR BLD: 36 % (ref 38–73)
NEUTROPHILS NFR BLD: 37 % (ref 38–73)
NEUTROPHILS NFR BLD: 40 % (ref 38–73)
NEUTROPHILS NFR BLD: 42 % (ref 38–73)
NEUTROPHILS NFR BLD: 44.5 % (ref 38–73)
NEUTROPHILS NFR BLD: 45 % (ref 38–73)
NEUTROPHILS NFR BLD: 45 % (ref 38–73)
NEUTROPHILS NFR BLD: 47 % (ref 38–73)
NEUTROPHILS NFR BLD: 47 % (ref 38–73)
NEUTROPHILS NFR BLD: 48 % (ref 38–73)
NEUTROPHILS NFR BLD: 49 % (ref 38–73)
NEUTROPHILS NFR BLD: 51 % (ref 38–73)
NEUTROPHILS NFR BLD: 51 % (ref 38–73)
NEUTROPHILS NFR BLD: 52 % (ref 38–73)
NEUTROPHILS NFR BLD: 55 % (ref 38–73)
NEUTROPHILS NFR BLD: 56 % (ref 38–73)
NEUTROPHILS NFR BLD: 57 % (ref 38–73)
NEUTROPHILS NFR BLD: 58 % (ref 38–73)
NEUTROPHILS NFR BLD: 58 % (ref 38–73)
NEUTROPHILS NFR BLD: 61 % (ref 38–73)
NEUTROPHILS NFR BLD: 64 % (ref 38–73)
NEUTROPHILS NFR BLD: 67 % (ref 38–73)
NEUTS BAND NFR BLD MANUAL: 1 %
NEUTS BAND NFR BLD MANUAL: 1 % (ref 0–10)
NEUTS BAND NFR BLD MANUAL: 1.5 %
NEUTS BAND NFR BLD MANUAL: 1.5 %
NEUTS BAND NFR BLD MANUAL: 10 % (ref 0–10)
NEUTS BAND NFR BLD MANUAL: 10 % (ref 0–10)
NEUTS BAND NFR BLD MANUAL: 12 % (ref 0–10)
NEUTS BAND NFR BLD MANUAL: 2 %
NEUTS BAND NFR BLD MANUAL: 2 %
NEUTS BAND NFR BLD MANUAL: 2 % (ref 0–10)
NEUTS BAND NFR BLD MANUAL: 2 % (ref 0–10)
NEUTS BAND NFR BLD MANUAL: 3 %
NEUTS BAND NFR BLD MANUAL: 4 %
NEUTS BAND NFR BLD MANUAL: 4 % (ref 0–10)
NEUTS BAND NFR BLD MANUAL: 4 % (ref 0–10)
NEUTS BAND NFR BLD MANUAL: 5 %
NEUTS BAND NFR BLD MANUAL: 5 % (ref 0–10)
NEUTS BAND NFR BLD MANUAL: 6 % (ref 0–10)
NEUTS BAND NFR BLD MANUAL: 6 % (ref 0–10)
NEUTS BAND NFR BLD MANUAL: 7 %
NEUTS BAND NFR BLD MANUAL: 7 %
NEUTS BAND NFR BLD MANUAL: 7 % (ref 0–10)
NEUTS BAND NFR BLD MANUAL: 8 % (ref 0–10)
NEUTS BAND NFR BLD MANUAL: 9 % (ref 0–10)
NGS CLINCIAL TRIALS: NORMAL
NGS CLINCIAL TRIALS: NORMAL
NGS INDICATION OF TEST: NORMAL
NGS INTERPRETATION: NORMAL
NGS INTERPRETATION: NORMAL
NGS ONCOHEME PANEL GENE LIST: NORMAL
NGS ONCOHEME PANEL GENE LIST: NORMAL
NGS PATHOGENIC MUTATIONS DETECTED: NORMAL
NGS PATHOGENIC MUTATIONS DETECTED: NORMAL
NGS REVIEWED BY:: NORMAL
NGS REVIEWED BY:: NORMAL
NGS VARIANTS OF UNKNOWN SIGNIFICANCE: NORMAL
NGS VARIANTS OF UNKNOWN SIGNIFICANCE: NORMAL
NGSHM RESULT, BLOOD: NORMAL
NGSHM RESULT, BONE MARROW: NORMAL
NITRITE UR QL STRIP: NEGATIVE
NPM1 SIGNING PATHOLOGIST: NORMAL
NPM1 SPECIMEN TYPE: NORMAL
NRBC BLD AUTO-RTO: 11.1 % (ref 0–0.2)
NRBC BLD MANUAL-RTO: 11 %
NRBC BLD MANUAL-RTO: 11 %
NRBC BLD MANUAL-RTO: 12 %
NRBC BLD MANUAL-RTO: 16 %
NRBC BLD MANUAL-RTO: 23 %
NRBC BLD MANUAL-RTO: 3 %
NRBC BLD MANUAL-RTO: 36 %
NRBC BLD MANUAL-RTO: 4 %
NRBC BLD MANUAL-RTO: 4 %
NRBC BLD MANUAL-RTO: 5 %
NRBC BLD MANUAL-RTO: 6 %
NRBC BLD MANUAL-RTO: 6 %
NRBC BLD MANUAL-RTO: 8 %
NRBC BLD MANUAL-RTO: 9 %
NRBC BLD MANUAL-RTO: 9 %
NRBC BLD-RTO: 0 /100 WBC
NRBC BLD-RTO: 1 /100 WBC
NUM UNITS TRANS PACKED RBC: NORMAL
NUM UNITS TRANS WBC-POOR PLATPHERESIS: NORMAL
OVALOCYTES BLD QL SMEAR: ABNORMAL
OVALOCYTES BLD QL SMEAR: NORMAL
PATH REPORT.FINAL DX SPEC: NORMAL
PATH REPORT.FINAL DX SPEC: NORMAL
PATH REV BLD -IMP: NORMAL
PCO2 BLDA: 47.9 MMHG (ref 35–45)
PCO2 BLDA: 48.5 MMHG (ref 35–45)
PCO2 BLDA: 48.6 MMHG (ref 35–45)
PCO2 BLDA: 48.6 MMHG (ref 35–45)
PCO2 BLDA: 48.7 MMHG (ref 35–45)
PCO2 BLDA: 49.5 MMHG (ref 35–45)
PCO2 BLDA: 50.5 MMHG (ref 35–45)
PCO2 BLDA: 52.2 MMHG (ref 35–45)
PCO2 BLDA: 52.3 MMHG (ref 35–45)
PCO2 BLDA: 53.3 MMHG (ref 35–45)
PCO2 BLDA: 55.8 MMHG (ref 35–45)
PCO2 BLDA: 57.3 MMHG (ref 35–45)
PCO2 BLDA: 60.1 MMHG (ref 35–45)
PCO2 BLDA: 61.2 MMHG (ref 35–45)
PCO2 BLDA: 86.3 MMHG (ref 35–45)
PEEP: 10
PEEP: 12
PEEP: 14
PEEP: 8
PEEP: 8
PH SMN: 7.24 [PH] (ref 7.35–7.45)
PH SMN: 7.32 [PH] (ref 7.35–7.45)
PH SMN: 7.38 [PH] (ref 7.35–7.45)
PH SMN: 7.39 [PH] (ref 7.35–7.45)
PH SMN: 7.4 [PH] (ref 7.35–7.45)
PH SMN: 7.46 [PH] (ref 7.35–7.45)
PH SMN: 7.47 [PH] (ref 7.35–7.45)
PH SMN: 7.48 [PH] (ref 7.35–7.45)
PH SMN: 7.49 [PH] (ref 7.35–7.45)
PH SMN: 7.5 [PH] (ref 7.35–7.45)
PH SMN: 7.51 [PH] (ref 7.35–7.45)
PH SMN: 7.51 [PH] (ref 7.35–7.45)
PH SMN: 7.53 [PH] (ref 7.35–7.45)
PH UR STRIP: 5 [PH] (ref 5–8)
PHOSPHATE SERPL-MCNC: 2.9 MG/DL (ref 2.7–4.5)
PHOSPHATE SERPL-MCNC: 3 MG/DL (ref 2.7–4.5)
PHOSPHATE SERPL-MCNC: 3.1 MG/DL (ref 2.7–4.5)
PHOSPHATE SERPL-MCNC: 3.2 MG/DL (ref 2.7–4.5)
PHOSPHATE SERPL-MCNC: 3.3 MG/DL (ref 2.7–4.5)
PHOSPHATE SERPL-MCNC: 3.4 MG/DL (ref 2.7–4.5)
PHOSPHATE SERPL-MCNC: 3.7 MG/DL (ref 2.7–4.5)
PHOSPHATE SERPL-MCNC: 3.7 MG/DL (ref 2.7–4.5)
PHOSPHATE SERPL-MCNC: 3.8 MG/DL (ref 2.7–4.5)
PHOSPHATE SERPL-MCNC: 3.9 MG/DL (ref 2.7–4.5)
PHOSPHATE SERPL-MCNC: 4 MG/DL (ref 2.7–4.5)
PHOSPHATE SERPL-MCNC: 4 MG/DL (ref 2.7–4.5)
PHOSPHATE SERPL-MCNC: 4.3 MG/DL (ref 2.7–4.5)
PHOSPHATE SERPL-MCNC: 4.4 MG/DL (ref 2.7–4.5)
PHOSPHATE SERPL-MCNC: 4.4 MG/DL (ref 2.7–4.5)
PHOSPHATE SERPL-MCNC: 4.5 MG/DL (ref 2.3–4.7)
PHOSPHATE SERPL-MCNC: 4.5 MG/DL (ref 2.7–4.5)
PHOSPHATE SERPL-MCNC: 4.7 MG/DL (ref 2.7–4.5)
PHOSPHATE SERPL-MCNC: 4.8 MG/DL (ref 2.7–4.5)
PHOSPHATE SERPL-MCNC: 4.9 MG/DL (ref 2.7–4.5)
PHOSPHATE SERPL-MCNC: 5 MG/DL (ref 2.7–4.5)
PHOSPHATE SERPL-MCNC: 5.3 MG/DL (ref 2.7–4.5)
PHOSPHATE SERPL-MCNC: 5.4 MG/DL (ref 2.7–4.5)
PHOSPHATE SERPL-MCNC: 5.5 MG/DL (ref 2.7–4.5)
PHOSPHATE SERPL-MCNC: 5.6 MG/DL (ref 2.7–4.5)
PHOSPHATE SERPL-MCNC: 5.9 MG/DL (ref 2.7–4.5)
PHOSPHATE SERPL-MCNC: 5.9 MG/DL (ref 2.7–4.5)
PHOSPHATE SERPL-MCNC: 6 MG/DL (ref 2.7–4.5)
PHOSPHATE SERPL-MCNC: 6.9 MG/DL (ref 2.7–4.5)
PHOSPHATE SERPL-MCNC: 7 MG/DL (ref 2.7–4.5)
PHOSPHATE SERPL-MCNC: 7.7 MG/DL (ref 2.7–4.5)
PHOSPHATE SERPL-MCNC: 7.7 MG/DL (ref 2.7–4.5)
PHOSPHATE SERPL-MCNC: 7.9 MG/DL (ref 2.7–4.5)
PHOSPHATE SERPL-MCNC: 7.9 MG/DL (ref 2.7–4.5)
PHOSPHATE SERPL-MCNC: 8.7 MG/DL (ref 2.7–4.5)
PHOSPHATE SERPL-MCNC: 8.9 MG/DL (ref 2.7–4.5)
PIP: 26
PIP: 26
PIP: 27
PISA TR MAX VEL: 3.47 M/S
PISA TR MAX VEL: 3.8 M/S
PLATELET # BLD AUTO: 101 K/UL (ref 150–450)
PLATELET # BLD AUTO: 101 K/UL (ref 150–450)
PLATELET # BLD AUTO: 107 K/UL (ref 150–450)
PLATELET # BLD AUTO: 11 K/UL (ref 150–450)
PLATELET # BLD AUTO: 14 K/UL (ref 150–450)
PLATELET # BLD AUTO: 15 K/UL (ref 150–450)
PLATELET # BLD AUTO: 16 K/UL (ref 150–450)
PLATELET # BLD AUTO: 160 X10(3)/MCL (ref 130–400)
PLATELET # BLD AUTO: 161 X10(3)/MCL (ref 130–400)
PLATELET # BLD AUTO: 17 K/UL (ref 150–450)
PLATELET # BLD AUTO: 17 K/UL (ref 150–450)
PLATELET # BLD AUTO: 172 X10(3)/MCL (ref 130–400)
PLATELET # BLD AUTO: 178 X10(3)/MCL (ref 130–400)
PLATELET # BLD AUTO: 179 X10(3)/MCL (ref 130–400)
PLATELET # BLD AUTO: 179 X10(3)/MCL (ref 130–400)
PLATELET # BLD AUTO: 18 K/UL (ref 150–450)
PLATELET # BLD AUTO: 182 X10(3)/MCL (ref 130–400)
PLATELET # BLD AUTO: 184 X10(3)/MCL (ref 130–400)
PLATELET # BLD AUTO: 186 X10(3)/MCL (ref 130–400)
PLATELET # BLD AUTO: 187 X10(3)/MCL (ref 130–400)
PLATELET # BLD AUTO: 188 X10(3)/MCL (ref 130–400)
PLATELET # BLD AUTO: 19 K/UL (ref 150–450)
PLATELET # BLD AUTO: 19 K/UL (ref 150–450)
PLATELET # BLD AUTO: 192 X10(3)/MCL (ref 130–400)
PLATELET # BLD AUTO: 195 X10(3)/MCL (ref 130–400)
PLATELET # BLD AUTO: 197 X10(3)/MCL (ref 130–400)
PLATELET # BLD AUTO: 21 K/UL (ref 150–450)
PLATELET # BLD AUTO: 221 X10(3)/MCL (ref 130–400)
PLATELET # BLD AUTO: 221 X10(3)/MCL (ref 130–400)
PLATELET # BLD AUTO: 224 X10(3)/MCL (ref 130–400)
PLATELET # BLD AUTO: 226 X10(3)/MCL (ref 130–400)
PLATELET # BLD AUTO: 23 K/UL (ref 150–450)
PLATELET # BLD AUTO: 231 X10(3)/MCL (ref 130–400)
PLATELET # BLD AUTO: 237 X10(3)/MCL (ref 130–400)
PLATELET # BLD AUTO: 239 X10(3)/MCL (ref 130–400)
PLATELET # BLD AUTO: 246 X10(3)/MCL (ref 130–400)
PLATELET # BLD AUTO: 253 X10(3)/MCL (ref 130–400)
PLATELET # BLD AUTO: 261 X10(3)/MCL (ref 130–400)
PLATELET # BLD AUTO: 267 X10(3)/MCL (ref 130–400)
PLATELET # BLD AUTO: 271 X10(3)/MCL (ref 130–400)
PLATELET # BLD AUTO: 28 K/UL (ref 150–450)
PLATELET # BLD AUTO: 29 K/UL (ref 150–450)
PLATELET # BLD AUTO: 319 X10(3)/MCL (ref 130–400)
PLATELET # BLD AUTO: 33 K/UL (ref 150–450)
PLATELET # BLD AUTO: 36 K/UL (ref 150–450)
PLATELET # BLD AUTO: 36 K/UL (ref 150–450)
PLATELET # BLD AUTO: 37 K/UL (ref 150–450)
PLATELET # BLD AUTO: 47 K/UL (ref 150–450)
PLATELET # BLD AUTO: 67 K/UL (ref 150–450)
PLATELET # BLD AUTO: 7 K/UL (ref 150–450)
PLATELET # BLD AUTO: 7 K/UL (ref 150–450)
PLATELET # BLD AUTO: 75 K/UL (ref 150–450)
PLATELET # BLD AUTO: 79 K/UL (ref 150–450)
PLATELET # BLD AUTO: 79 K/UL (ref 150–450)
PLATELET # BLD AUTO: 8 K/UL (ref 150–450)
PLATELET # BLD AUTO: 86 K/UL (ref 150–450)
PLATELET # BLD AUTO: 86 K/UL (ref 150–450)
PLATELET # BLD AUTO: 90 K/UL (ref 150–450)
PLATELET # BLD AUTO: 91 K/UL (ref 150–450)
PLATELET # BLD AUTO: 92 K/UL (ref 150–450)
PLATELET # BLD AUTO: 93 K/UL (ref 150–450)
PLATELET # BLD AUTO: 93 K/UL (ref 150–450)
PLATELET # BLD AUTO: 94 K/UL (ref 150–450)
PLATELET # BLD AUTO: 98 K/UL (ref 150–450)
PLATELET # BLD EST: ADEQUATE 10*3/UL
PLATELET # BLD EST: NORMAL 10*3/UL
PLATELET BLD QL SMEAR: ABNORMAL
PMV BLD AUTO: 10.1 FL (ref 7.4–10.4)
PMV BLD AUTO: 10.1 FL (ref 7.4–10.4)
PMV BLD AUTO: 10.2 FL (ref 7.4–10.4)
PMV BLD AUTO: 10.2 FL (ref 7.4–10.4)
PMV BLD AUTO: 10.2 FL (ref 9.2–12.9)
PMV BLD AUTO: 10.4 FL (ref 7.4–10.4)
PMV BLD AUTO: 10.5 FL (ref 7.4–10.4)
PMV BLD AUTO: 10.5 FL (ref 9.2–12.9)
PMV BLD AUTO: 10.6 FL (ref 7.4–10.4)
PMV BLD AUTO: 10.7 FL (ref 7.4–10.4)
PMV BLD AUTO: 10.7 FL (ref 9.2–12.9)
PMV BLD AUTO: 10.7 FL (ref 9.2–12.9)
PMV BLD AUTO: 10.8 FL (ref 9.2–12.9)
PMV BLD AUTO: 10.9 FL (ref 7.4–10.4)
PMV BLD AUTO: 11 FL (ref 9.2–12.9)
PMV BLD AUTO: 11.1 FL (ref 9.2–12.9)
PMV BLD AUTO: 11.2 FL (ref 9.2–12.9)
PMV BLD AUTO: 11.4 FL (ref 9.2–12.9)
PMV BLD AUTO: 11.4 FL (ref 9.2–12.9)
PMV BLD AUTO: 11.5 FL (ref 9.2–12.9)
PMV BLD AUTO: 12.3 FL (ref 9.2–12.9)
PMV BLD AUTO: 12.5 FL (ref 9.2–12.9)
PMV BLD AUTO: 12.6 FL (ref 9.2–12.9)
PMV BLD AUTO: 13.2 FL (ref 9.2–12.9)
PMV BLD AUTO: 8.5 FL (ref 7.4–10.4)
PMV BLD AUTO: 8.6 FL (ref 7.4–10.4)
PMV BLD AUTO: 8.8 FL (ref 7.4–10.4)
PMV BLD AUTO: 8.9 FL (ref 7.4–10.4)
PMV BLD AUTO: 9.1 FL (ref 7.4–10.4)
PMV BLD AUTO: 9.2 FL (ref 7.4–10.4)
PMV BLD AUTO: 9.2 FL (ref 7.4–10.4)
PMV BLD AUTO: 9.3 FL (ref 7.4–10.4)
PMV BLD AUTO: 9.3 FL (ref 7.4–10.4)
PMV BLD AUTO: 9.4 FL (ref 7.4–10.4)
PMV BLD AUTO: 9.6 FL (ref 7.4–10.4)
PMV BLD AUTO: 9.7 FL (ref 7.4–10.4)
PMV BLD AUTO: 9.7 FL (ref 9.2–12.9)
PMV BLD AUTO: 9.8 FL (ref 7.4–10.4)
PMV BLD AUTO: 9.8 FL (ref 7.4–10.4)
PMV BLD AUTO: 9.9 FL (ref 7.4–10.4)
PMV BLD AUTO: ABNORMAL FL (ref 9.2–12.9)
PO2 BLDA: 120 MMHG (ref 80–100)
PO2 BLDA: 44 MMHG (ref 40–60)
PO2 BLDA: 49 MMHG (ref 80–100)
PO2 BLDA: 51 MMHG (ref 80–100)
PO2 BLDA: 53 MMHG (ref 80–100)
PO2 BLDA: 56 MMHG (ref 80–100)
PO2 BLDA: 56 MMHG (ref 80–100)
PO2 BLDA: 69 MMHG (ref 80–100)
PO2 BLDA: 72 MMHG (ref 80–100)
PO2 BLDA: 73 MMHG (ref 80–100)
PO2 BLDA: 74 MMHG (ref 80–100)
PO2 BLDA: 78 MMHG (ref 80–100)
PO2 BLDA: 89 MMHG (ref 80–100)
PO2 BLDA: 94 MMHG (ref 80–100)
PO2 BLDA: 97 MMHG (ref 80–100)
POC BE: 11 MMOL/L
POC BE: 11 MMOL/L
POC BE: 12 MMOL/L
POC BE: 13 MMOL/L
POC BE: 14 MMOL/L
POC BE: 15 MMOL/L
POC BE: 16 MMOL/L
POC BE: 18 MMOL/L
POC BE: 21 MMOL/L
POC BE: 4 MMOL/L
POC BE: 5 MMOL/L
POC BE: 9 MMOL/L
POC IONIZED CALCIUM: 1.04 MMOL/L (ref 1.06–1.42)
POC IONIZED CALCIUM: 1.06 MMOL/L (ref 1.06–1.42)
POC IONIZED CALCIUM: 1.08 MMOL/L (ref 1.06–1.42)
POC IONIZED CALCIUM: 1.11 MMOL/L (ref 1.06–1.42)
POC IONIZED CALCIUM: 1.11 MMOL/L (ref 1.06–1.42)
POC IONIZED CALCIUM: 1.13 MMOL/L (ref 1.06–1.42)
POC IONIZED CALCIUM: 1.13 MMOL/L (ref 1.06–1.42)
POC IONIZED CALCIUM: 1.14 MMOL/L (ref 1.06–1.42)
POC IONIZED CALCIUM: 1.17 MMOL/L (ref 1.06–1.42)
POC IONIZED CALCIUM: 1.19 MMOL/L (ref 1.06–1.42)
POC IONIZED CALCIUM: 1.19 MMOL/L (ref 1.06–1.42)
POC IONIZED CALCIUM: 1.28 MMOL/L (ref 1.06–1.42)
POC SATURATED O2: 74 % (ref 95–100)
POC SATURATED O2: 76 % (ref 95–100)
POC SATURATED O2: 85 % (ref 95–100)
POC SATURATED O2: 87 % (ref 95–100)
POC SATURATED O2: 87 % (ref 95–100)
POC SATURATED O2: 90 % (ref 95–100)
POC SATURATED O2: 94 % (ref 95–100)
POC SATURATED O2: 95 % (ref 95–100)
POC SATURATED O2: 96 % (ref 95–100)
POC SATURATED O2: 97 % (ref 95–100)
POC SATURATED O2: 98 % (ref 95–100)
POC SATURATED O2: 98 % (ref 95–100)
POC SATURATED O2: 99 % (ref 95–100)
POC TCO2: 30 MMOL/L (ref 23–27)
POC TCO2: 32 MMOL/L (ref 24–29)
POC TCO2: 36 MMOL/L (ref 23–27)
POC TCO2: 37 MMOL/L (ref 23–27)
POC TCO2: 38 MMOL/L (ref 23–27)
POC TCO2: 39 MMOL/L (ref 23–27)
POC TCO2: 40 MMOL/L (ref 23–27)
POC TCO2: 40 MMOL/L (ref 23–27)
POC TCO2: 41 MMOL/L (ref 23–27)
POC TCO2: 42 MMOL/L (ref 23–27)
POC TCO2: 46 MMOL/L (ref 23–27)
POCT GLUCOSE: 103 MG/DL (ref 70–110)
POCT GLUCOSE: 134 MG/DL (ref 70–110)
POCT GLUCOSE: 150 MG/DL (ref 70–110)
POCT GLUCOSE: 152 MG/DL (ref 70–110)
POCT GLUCOSE: 159 MG/DL (ref 70–110)
POIKILOCYTOSIS BLD QL SMEAR: ABNORMAL
POIKILOCYTOSIS BLD QL SMEAR: NORMAL
POIKILOCYTOSIS BLD QL SMEAR: NORMAL
POIKILOCYTOSIS BLD QL SMEAR: SLIGHT
POLYCHROMASIA BLD QL SMEAR: ABNORMAL
POLYCHROMASIA BLD QL SMEAR: NORMAL
POLYCHROMASIA BLD QL SMEAR: NORMAL
POTASSIUM BLD-SCNC: 3.2 MMOL/L (ref 3.5–5.1)
POTASSIUM BLD-SCNC: 3.3 MMOL/L (ref 3.5–5.1)
POTASSIUM BLD-SCNC: 3.3 MMOL/L (ref 3.5–5.1)
POTASSIUM BLD-SCNC: 3.4 MMOL/L (ref 3.5–5.1)
POTASSIUM BLD-SCNC: 3.5 MMOL/L (ref 3.5–5.1)
POTASSIUM BLD-SCNC: 3.6 MMOL/L (ref 3.5–5.1)
POTASSIUM BLD-SCNC: 3.7 MMOL/L (ref 3.5–5.1)
POTASSIUM BLD-SCNC: 3.7 MMOL/L (ref 3.5–5.1)
POTASSIUM BLD-SCNC: 3.8 MMOL/L (ref 3.5–5.1)
POTASSIUM BLD-SCNC: 3.8 MMOL/L (ref 3.5–5.1)
POTASSIUM SERPL-SCNC: 3.2 MMOL/L (ref 3.5–5.1)
POTASSIUM SERPL-SCNC: 3.2 MMOL/L (ref 3.5–5.1)
POTASSIUM SERPL-SCNC: 3.3 MMOL/L (ref 3.5–5.1)
POTASSIUM SERPL-SCNC: 3.3 MMOL/L (ref 3.5–5.1)
POTASSIUM SERPL-SCNC: 3.4 MMOL/L (ref 3.5–5.1)
POTASSIUM SERPL-SCNC: 3.5 MMOL/L (ref 3.5–5.1)
POTASSIUM SERPL-SCNC: 3.5 MMOL/L (ref 3.5–5.1)
POTASSIUM SERPL-SCNC: 3.6 MMOL/L (ref 3.5–5.1)
POTASSIUM SERPL-SCNC: 3.7 MMOL/L (ref 3.5–5.1)
POTASSIUM SERPL-SCNC: 3.7 MMOL/L (ref 3.5–5.1)
POTASSIUM SERPL-SCNC: 3.8 MMOL/L (ref 3.5–5.1)
POTASSIUM SERPL-SCNC: 3.9 MMOL/L (ref 3.5–5.1)
POTASSIUM SERPL-SCNC: 3.9 MMOL/L (ref 3.5–5.1)
POTASSIUM SERPL-SCNC: 4 MMOL/L (ref 3.5–5.1)
POTASSIUM SERPL-SCNC: 4.1 MMOL/L (ref 3.5–5.1)
POTASSIUM SERPL-SCNC: 4.2 MMOL/L (ref 3.5–5.1)
POTASSIUM SERPL-SCNC: 4.2 MMOL/L (ref 3.5–5.1)
POTASSIUM SERPL-SCNC: 4.3 MMOL/L (ref 3.5–5.1)
POTASSIUM SERPL-SCNC: 4.4 MMOL/L (ref 3.5–5.1)
POTASSIUM SERPL-SCNC: 4.4 MMOL/L (ref 3.5–5.1)
POTASSIUM SERPL-SCNC: 4.5 MMOL/L (ref 3.5–5.1)
POTASSIUM SERPL-SCNC: 4.6 MMOL/L (ref 3.5–5.1)
POTASSIUM SERPL-SCNC: 4.7 MMOL/L (ref 3.5–5.1)
POTASSIUM SERPL-SCNC: 4.8 MMOL/L (ref 3.5–5.1)
POTASSIUM SERPL-SCNC: 4.9 MMOL/L (ref 3.5–5.1)
POTASSIUM SERPL-SCNC: 5 MMOL/L (ref 3.5–5.1)
POTASSIUM SERPL-SCNC: 5.1 MMOL/L (ref 3.5–5.1)
POTASSIUM SERPL-SCNC: 5.2 MMOL/L (ref 3.5–5.1)
POTASSIUM SERPL-SCNC: 5.2 MMOL/L (ref 3.5–5.1)
POTASSIUM SERPL-SCNC: 5.3 MMOL/L (ref 3.5–5.1)
POTASSIUM SERPL-SCNC: 5.4 MMOL/L (ref 3.5–5.1)
POTASSIUM SERPL-SCNC: 5.5 MMOL/L (ref 3.5–5.1)
POTASSIUM SERPL-SCNC: 5.6 MMOL/L (ref 3.5–5.1)
POTASSIUM SERPL-SCNC: 5.8 MMOL/L (ref 3.5–5.1)
POTASSIUM SERPL-SCNC: 6 MMOL/L (ref 3.5–5.1)
POTASSIUM SERPL-SCNC: 6.1 MMOL/L (ref 3.5–5.1)
PROMYELOCYTES NFR BLD MANUAL: 1 %
PROMYELOCYTES NFR BLD MANUAL: 1.5 %
PROMYELOCYTES NFR BLD MANUAL: 18 %
PROMYELOCYTES NFR BLD MANUAL: 18 %
PROMYELOCYTES NFR BLD MANUAL: 2 %
PROMYELOCYTES NFR BLD MANUAL: 4 %
PROT SERPL-MCNC: 4.7 G/DL (ref 6–8.4)
PROT SERPL-MCNC: 5 G/DL (ref 6–8.4)
PROT SERPL-MCNC: 5.1 G/DL (ref 6–8.4)
PROT SERPL-MCNC: 5.2 G/DL (ref 6–8.4)
PROT SERPL-MCNC: 5.2 G/DL (ref 6–8.4)
PROT SERPL-MCNC: 5.3 G/DL (ref 6–8.4)
PROT SERPL-MCNC: 5.4 G/DL (ref 6–8.4)
PROT SERPL-MCNC: 5.5 G/DL (ref 6–8.4)
PROT SERPL-MCNC: 5.7 G/DL (ref 6–8.4)
PROT SERPL-MCNC: 5.8 G/DL (ref 6–8.4)
PROT SERPL-MCNC: 5.9 G/DL (ref 6–8.4)
PROT SERPL-MCNC: 6 G/DL (ref 6–8.4)
PROT SERPL-MCNC: 6 GM/DL (ref 6.4–8.3)
PROT SERPL-MCNC: 6.1 G/DL (ref 6–8.4)
PROT SERPL-MCNC: 6.2 G/DL (ref 6–8.4)
PROT SERPL-MCNC: 6.2 GM/DL (ref 6.4–8.3)
PROT SERPL-MCNC: 6.2 GM/DL (ref 6.4–8.3)
PROT SERPL-MCNC: 6.3 G/DL (ref 6–8.4)
PROT SERPL-MCNC: 6.3 G/DL (ref 6–8.4)
PROT SERPL-MCNC: 6.3 GM/DL (ref 6.4–8.3)
PROT SERPL-MCNC: 6.4 G/DL (ref 6–8.4)
PROT SERPL-MCNC: 6.4 GM/DL (ref 6.4–8.3)
PROT SERPL-MCNC: 6.5 G/DL (ref 6–8.4)
PROT SERPL-MCNC: 6.5 GM/DL (ref 6.4–8.3)
PROT SERPL-MCNC: 6.6 G/DL (ref 6–8.4)
PROT SERPL-MCNC: 6.6 GM/DL (ref 6.4–8.3)
PROT SERPL-MCNC: 6.7 G/DL (ref 6–8.4)
PROT SERPL-MCNC: 6.7 GM/DL (ref 6.4–8.3)
PROT SERPL-MCNC: 6.8 GM/DL (ref 6.4–8.3)
PROT UR QL STRIP: ABNORMAL
PROTHROMBIN TIME: 13.3 SEC (ref 9–12.5)
PROTHROMBIN TIME: 13.4 SEC (ref 9–12.5)
PROTHROMBIN TIME: 13.4 SEC (ref 9–12.5)
PROTHROMBIN TIME: 13.7 SEC (ref 9–12.5)
PROTHROMBIN TIME: 14 SEC (ref 9–12.5)
PROTHROMBIN TIME: 14.7 SEC (ref 9–12.5)
PROTHROMBIN TIME: 15 SEC (ref 9–12.5)
PROTHROMBIN TIME: 16 SEC (ref 9–12.5)
PROTHROMBIN TIME: 23.6 SEC (ref 9–12.5)
PROTHROMBIN TIME: 27.7 SEC (ref 9–12.5)
RA MAJOR: 6.07 CM
RA MAJOR: 6.21 CM
RA MAJOR: 6.27 CM
RA PRESSURE: 15 MMHG
RA PRESSURE: 8 MMHG
RA WIDTH: 3.94 CM
RA WIDTH: 4.42 CM
RA WIDTH: 5.46 CM
RBC # BLD AUTO: 1.89 M/UL (ref 4.6–6.2)
RBC # BLD AUTO: 2.04 M/UL (ref 4.6–6.2)
RBC # BLD AUTO: 2.11 M/UL (ref 4.6–6.2)
RBC # BLD AUTO: 2.12 M/UL (ref 4.6–6.2)
RBC # BLD AUTO: 2.23 M/UL (ref 4.6–6.2)
RBC # BLD AUTO: 2.24 M/UL (ref 4.6–6.2)
RBC # BLD AUTO: 2.25 M/UL (ref 4.6–6.2)
RBC # BLD AUTO: 2.27 M/UL (ref 4.6–6.2)
RBC # BLD AUTO: 2.3 M/UL (ref 4.6–6.2)
RBC # BLD AUTO: 2.3 M/UL (ref 4.6–6.2)
RBC # BLD AUTO: 2.32 M/UL (ref 4.6–6.2)
RBC # BLD AUTO: 2.32 M/UL (ref 4.6–6.2)
RBC # BLD AUTO: 2.33 M/UL (ref 4.6–6.2)
RBC # BLD AUTO: 2.39 M/UL (ref 4.6–6.2)
RBC # BLD AUTO: 2.4 M/UL (ref 4.6–6.2)
RBC # BLD AUTO: 2.41 M/UL (ref 4.6–6.2)
RBC # BLD AUTO: 2.42 M/UL (ref 4.6–6.2)
RBC # BLD AUTO: 2.45 M/UL (ref 4.6–6.2)
RBC # BLD AUTO: 2.55 M/UL (ref 4.6–6.2)
RBC # BLD AUTO: 2.61 M/UL (ref 4.6–6.2)
RBC # BLD AUTO: 2.69 M/UL (ref 4.6–6.2)
RBC # BLD AUTO: 2.71 M/UL (ref 4.6–6.2)
RBC # BLD AUTO: 2.74 M/UL (ref 4.6–6.2)
RBC # BLD AUTO: 2.78 X10(6)/MCL (ref 4.5–5.9)
RBC # BLD AUTO: 2.91 M/UL (ref 4.6–6.2)
RBC # BLD AUTO: 2.92 M/UL (ref 4.6–6.2)
RBC # BLD AUTO: 2.92 M/UL (ref 4.6–6.2)
RBC # BLD AUTO: 2.95 M/UL (ref 4.6–6.2)
RBC # BLD AUTO: 2.97 M/UL (ref 4.6–6.2)
RBC # BLD AUTO: 2.97 X10(6)/MCL (ref 4.5–5.9)
RBC # BLD AUTO: 3 M/UL (ref 4.6–6.2)
RBC # BLD AUTO: 3.02 M/UL (ref 4.6–6.2)
RBC # BLD AUTO: 3.04 M/UL (ref 4.6–6.2)
RBC # BLD AUTO: 3.04 M/UL (ref 4.6–6.2)
RBC # BLD AUTO: 3.05 M/UL (ref 4.6–6.2)
RBC # BLD AUTO: 3.06 M/UL (ref 4.6–6.2)
RBC # BLD AUTO: 3.09 M/UL (ref 4.6–6.2)
RBC # BLD AUTO: 3.09 X10(6)/MCL (ref 4.5–5.9)
RBC # BLD AUTO: 3.1 M/UL (ref 4.6–6.2)
RBC # BLD AUTO: 3.12 M/UL (ref 4.6–6.2)
RBC # BLD AUTO: 3.13 X10(6)/MCL (ref 4.5–5.9)
RBC # BLD AUTO: 3.15 M/UL (ref 4.6–6.2)
RBC # BLD AUTO: 3.15 X10(6)/MCL (ref 4.5–5.9)
RBC # BLD AUTO: 3.18 X10(6)/MCL (ref 4.5–5.9)
RBC # BLD AUTO: 3.25 X10(6)/MCL (ref 4.5–5.9)
RBC # BLD AUTO: 3.27 X10(6)/MCL (ref 4.5–5.9)
RBC # BLD AUTO: 3.31 X10(6)/MCL (ref 4.5–5.9)
RBC # BLD AUTO: 3.34 X10(6)/MCL (ref 4.5–5.9)
RBC # BLD AUTO: 3.36 X10(6)/MCL (ref 4.5–5.9)
RBC # BLD AUTO: 3.41 X10(6)/MCL (ref 4.5–5.9)
RBC # BLD AUTO: 3.43 X10(6)/MCL (ref 4.5–5.9)
RBC # BLD AUTO: 3.45 X10(6)/MCL (ref 4.5–5.9)
RBC # BLD AUTO: 3.45 X10(6)/MCL (ref 4.5–5.9)
RBC # BLD AUTO: 3.51 X10(6)/MCL (ref 4.5–5.9)
RBC # BLD AUTO: 3.55 X10(6)/MCL (ref 4.5–5.9)
RBC # BLD AUTO: 3.57 X10(6)/MCL (ref 4.5–5.9)
RBC # BLD AUTO: 3.57 X10(6)/MCL (ref 4.5–5.9)
RBC # BLD AUTO: 3.63 X10(6)/MCL (ref 4.5–5.9)
RBC # BLD AUTO: 3.63 X10(6)/MCL (ref 4.5–5.9)
RBC # BLD AUTO: 3.65 X10(6)/MCL (ref 4.5–5.9)
RBC # BLD AUTO: 3.67 X10(6)/MCL (ref 4.5–5.9)
RBC # BLD AUTO: 3.75 X10(6)/MCL (ref 4.5–5.9)
RBC # BLD AUTO: 3.86 X10(6)/MCL (ref 4.5–5.9)
RBC #/AREA URNS AUTO: 68 /HPF (ref 0–4)
RBC MORPH BLD: ABNORMAL
RBC MORPH BLD: NORMAL
REASON FOR REFERRAL (NARRATIVE): NORMAL
REASON FOR REFERRAL, CHROMOSOME BLOOD: NORMAL
REF LAB TEST METHOD: NORMAL
RIGHT ATRIAL AREA: 22 CM2
RIGHT VENTRICULAR END-DIASTOLIC DIMENSION: 4.16 CM
RIGHT VENTRICULAR END-DIASTOLIC DIMENSION: 4.6 CM
RIGHT VENTRICULAR END-DIASTOLIC DIMENSION: 5.24 CM
RV TISSUE DOPPLER FREE WALL SYSTOLIC VELOCITY 1 (APICAL 4 CHAMBER VIEW): 19.44 CM/S
RV TISSUE DOPPLER FREE WALL SYSTOLIC VELOCITY 1 (APICAL 4 CHAMBER VIEW): 20.06 CM/S
SAMPLE: ABNORMAL
SAMPLE: NORMAL
SARS-COV-2 RNA RESP QL NAA+PROBE: NOT DETECTED
SATURATED IRON: 12 % (ref 20–50)
SCHISTOCYTES BLD QL AUTO: ABNORMAL
SCHISTOCYTES BLD QL SMEAR: ABNORMAL
SCHISTOCYTES BLD QL SMEAR: ABNORMAL
SCHISTOCYTES BLD QL SMEAR: PRESENT
SINUS: 3.59 CM
SINUS: 3.67 CM
SINUS: 4.11 CM
SITE: ABNORMAL
SITE: NORMAL
SMUDGE CELLS BLD QL SMEAR: PRESENT
SODIUM BLD-SCNC: 133 MMOL/L (ref 136–145)
SODIUM BLD-SCNC: 134 MMOL/L (ref 136–145)
SODIUM BLD-SCNC: 135 MMOL/L (ref 136–145)
SODIUM BLD-SCNC: 136 MMOL/L (ref 136–145)
SODIUM BLD-SCNC: 137 MMOL/L (ref 136–145)
SODIUM BLD-SCNC: 137 MMOL/L (ref 136–145)
SODIUM SERPL-SCNC: 115 MMOL/L (ref 136–145)
SODIUM SERPL-SCNC: 122 MMOL/L (ref 136–145)
SODIUM SERPL-SCNC: 124 MMOL/L (ref 136–145)
SODIUM SERPL-SCNC: 124 MMOL/L (ref 136–145)
SODIUM SERPL-SCNC: 126 MMOL/L (ref 136–145)
SODIUM SERPL-SCNC: 127 MMOL/L (ref 136–145)
SODIUM SERPL-SCNC: 129 MMOL/L (ref 136–145)
SODIUM SERPL-SCNC: 130 MMOL/L (ref 136–145)
SODIUM SERPL-SCNC: 131 MMOL/L (ref 136–145)
SODIUM SERPL-SCNC: 131 MMOL/L (ref 136–145)
SODIUM SERPL-SCNC: 132 MMOL/L (ref 136–145)
SODIUM SERPL-SCNC: 133 MMOL/L (ref 136–145)
SODIUM SERPL-SCNC: 134 MMOL/L (ref 136–145)
SODIUM SERPL-SCNC: 135 MMOL/L (ref 136–145)
SODIUM SERPL-SCNC: 136 MMOL/L (ref 136–145)
SODIUM SERPL-SCNC: 137 MMOL/L (ref 136–145)
SODIUM SERPL-SCNC: 138 MMOL/L (ref 136–145)
SODIUM SERPL-SCNC: 139 MMOL/L (ref 136–145)
SODIUM SERPL-SCNC: 140 MMOL/L (ref 136–145)
SODIUM SERPL-SCNC: 140 MMOL/L (ref 136–145)
SODIUM SERPL-SCNC: 143 MMOL/L (ref 136–145)
SODIUM SERPL-SCNC: 149 MMOL/L (ref 136–145)
SP GR UR STRIP: 1.01 (ref 1–1.03)
SP02: 91
SP02: 93
SP02: 94
SP02: 95
SP02: 97
SP02: 97
SPECIMEN SOURCE: NORMAL
SPECIMEN TYPE, CHROMOSOME BLOOD: NORMAL
SPECIMEN TYPE: NORMAL
SPECIMEN: NORMAL
SPHEROCYTES BLD QL SMEAR: ABNORMAL
SQUAMOUS #/AREA URNS AUTO: 1 /HPF
STJ: 3.26 CM
STJ: 3.43 CM
STJ: 3.52 CM
SUPPLEMENTAL DIAGNOSIS: NORMAL
TARGETS BLD QL SMEAR: ABNORMAL
TDI LATERAL: 0.09 M/S
TDI LATERAL: 0.1 M/S
TDI LATERAL: 0.14 M/S
TDI SEPTAL: 0.06 M/S
TDI SEPTAL: 0.07 M/S
TDI SEPTAL: 0.11 M/S
TDI: 0.08 M/S
TDI: 0.09 M/S
TDI: 0.13 M/S
TEST PERFORMANCE INFO SPEC: NORMAL
TEST PERFORMANCE INFO SPEC: NORMAL
TIBC SERPL-MCNC: 152 UG/DL (ref 69–240)
TIBC SERPL-MCNC: 211 UG/DL (ref 69–240)
TIBC SERPL-MCNC: 225 UG/DL (ref 250–450)
TIBC SERPL-MCNC: 235 UG/DL (ref 250–450)
TIBC SERPL-MCNC: 265 UG/DL (ref 250–450)
TIBC SERPL-MCNC: 73 UG/DL (ref 69–240)
TOTAL IRON BINDING CAPACITY: 234 UG/DL (ref 250–450)
TR MAX PG: 48 MMHG
TR MAX PG: 58 MMHG
TRANS ERYTHROCYTES VOL PATIENT: NORMAL ML
TRANSFERRIN SERPL-MCNC: 158 MG/DL (ref 200–375)
TRANSFERRIN SERPL-MCNC: 196 MG/DL (ref 174–364)
TRANSFERRIN SERPL-MCNC: 201 MG/DL (ref 174–364)
TRANSFERRIN SERPL-MCNC: 228 MG/DL (ref 174–364)
TRICUSPID ANNULAR PLANE SYSTOLIC EXCURSION: 1.83 CM
TRICUSPID ANNULAR PLANE SYSTOLIC EXCURSION: 2.45 CM
TRICUSPID ANNULAR PLANE SYSTOLIC EXCURSION: 2.77 CM
TROPONIN I SERPL DL<=0.01 NG/ML-MCNC: 0.03 NG/ML (ref 0–0.03)
TROPONIN I SERPL DL<=0.01 NG/ML-MCNC: <0.006 NG/ML (ref 0–0.03)
TROPONIN I SERPL DL<=0.01 NG/ML-MCNC: <0.006 NG/ML (ref 0–0.03)
TV REST PULMONARY ARTERY PRESSURE: 63 MMHG
TV REST PULMONARY ARTERY PRESSURE: 66 MMHG
UNIT NUMBER: NORMAL
URATE SERPL-MCNC: 10.1 MG/DL (ref 3.4–7)
URATE SERPL-MCNC: 10.2 MG/DL (ref 3.4–7)
URATE SERPL-MCNC: 10.2 MG/DL (ref 3.4–7)
URATE SERPL-MCNC: 10.4 MG/DL (ref 3.4–7)
URATE SERPL-MCNC: 10.6 MG/DL (ref 3.4–7)
URATE SERPL-MCNC: 10.8 MG/DL (ref 3.4–7)
URATE SERPL-MCNC: 11.2 MG/DL (ref 3.4–7)
URATE SERPL-MCNC: 11.5 MG/DL (ref 3.4–7)
URATE SERPL-MCNC: 11.8 MG/DL (ref 3.4–7)
URATE SERPL-MCNC: 11.8 MG/DL (ref 3.4–7)
URATE SERPL-MCNC: 12.2 MG/DL (ref 3.4–7)
URATE SERPL-MCNC: 12.4 MG/DL (ref 3.4–7)
URATE SERPL-MCNC: 13.4 MG/DL (ref 3.4–7)
URATE SERPL-MCNC: 13.4 MG/DL (ref 3.4–7)
URATE SERPL-MCNC: 16.2 MG/DL (ref 3.4–7)
URATE SERPL-MCNC: 18.4 MG/DL (ref 3.4–7)
URATE SERPL-MCNC: 18.4 MG/DL (ref 3.4–7)
URATE SERPL-MCNC: 6.5 MG/DL (ref 3.4–7)
URATE SERPL-MCNC: 6.6 MG/DL (ref 3.4–7)
URATE SERPL-MCNC: 6.6 MG/DL (ref 3.4–7)
URATE SERPL-MCNC: 6.7 MG/DL (ref 3.4–7)
URATE SERPL-MCNC: 6.8 MG/DL (ref 3.4–7)
URATE SERPL-MCNC: 6.8 MG/DL (ref 3.4–7)
URATE SERPL-MCNC: 7 MG/DL (ref 3.4–7)
URATE SERPL-MCNC: 7.1 MG/DL (ref 3.4–7)
URATE SERPL-MCNC: 7.1 MG/DL (ref 3.4–7)
URATE SERPL-MCNC: 7.2 MG/DL (ref 3.4–7)
URATE SERPL-MCNC: 7.2 MG/DL (ref 3.4–7)
URATE SERPL-MCNC: 7.3 MG/DL (ref 3.4–7)
URATE SERPL-MCNC: 7.4 MG/DL (ref 3.4–7)
URATE SERPL-MCNC: 7.5 MG/DL (ref 3.4–7)
URATE SERPL-MCNC: 7.6 MG/DL (ref 3.4–7)
URATE SERPL-MCNC: 7.7 MG/DL (ref 3.4–7)
URATE SERPL-MCNC: 7.7 MG/DL (ref 3.4–7)
URATE SERPL-MCNC: 7.8 MG/DL (ref 3.4–7)
URATE SERPL-MCNC: 7.8 MG/DL (ref 3.4–7)
URATE SERPL-MCNC: 7.9 MG/DL (ref 3.4–7)
URATE SERPL-MCNC: 8 MG/DL (ref 3.4–7)
URATE SERPL-MCNC: 8.1 MG/DL (ref 3.4–7)
URATE SERPL-MCNC: 8.1 MG/DL (ref 3.4–7)
URATE SERPL-MCNC: 8.2 MG/DL (ref 3.4–7)
URATE SERPL-MCNC: 8.3 MG/DL (ref 3.4–7)
URATE SERPL-MCNC: 8.4 MG/DL (ref 3.4–7)
URATE SERPL-MCNC: 8.5 MG/DL (ref 3.4–7)
URATE SERPL-MCNC: 8.6 MG/DL (ref 3.4–7)
URATE SERPL-MCNC: 8.7 MG/DL (ref 3.4–7)
URATE SERPL-MCNC: 8.9 MG/DL (ref 3.4–7)
URATE SERPL-MCNC: 9 MG/DL (ref 3.4–7)
URATE SERPL-MCNC: 9 MG/DL (ref 3.4–7)
URATE SERPL-MCNC: 9.1 MG/DL (ref 3.4–7)
URATE SERPL-MCNC: 9.2 MG/DL (ref 3.4–7)
URATE SERPL-MCNC: 9.2 MG/DL (ref 3.4–7)
URATE SERPL-MCNC: 9.3 MG/DL (ref 3.4–7)
URATE SERPL-MCNC: 9.4 MG/DL (ref 3.4–7)
URATE SERPL-MCNC: 9.6 MG/DL (ref 3.4–7)
URATE SERPL-MCNC: 9.8 MG/DL (ref 3.4–7)
URATE SERPL-MCNC: 9.9 MG/DL (ref 3.4–7)
URN SPEC COLLECT METH UR: ABNORMAL
VANCOMYCIN SERPL-MCNC: 9.8 UG/ML
VT: 420
VT: 470
VT: 470
VT: 500
WBC # BLD AUTO: 10.01 K/UL (ref 3.9–12.7)
WBC # BLD AUTO: 10.48 K/UL (ref 3.9–12.7)
WBC # BLD AUTO: 102.9 K/UL (ref 3.9–12.7)
WBC # BLD AUTO: 14.53 K/UL (ref 3.9–12.7)
WBC # BLD AUTO: 16.49 K/UL (ref 3.9–12.7)
WBC # BLD AUTO: 16.69 K/UL (ref 3.9–12.7)
WBC # BLD AUTO: 17.9 K/UL (ref 3.9–12.7)
WBC # BLD AUTO: 21.36 K/UL (ref 3.9–12.7)
WBC # BLD AUTO: 24.74 K/UL (ref 3.9–12.7)
WBC # BLD AUTO: 35.15 K/UL (ref 3.9–12.7)
WBC # BLD AUTO: 35.74 K/UL (ref 3.9–12.7)
WBC # BLD AUTO: 37.53 K/UL (ref 3.9–12.7)
WBC # BLD AUTO: 39.01 K/UL (ref 3.9–12.7)
WBC # BLD AUTO: 4.82 K/UL (ref 3.9–12.7)
WBC # BLD AUTO: 54.32 K/UL (ref 3.9–12.7)
WBC # BLD AUTO: 54.39 K/UL (ref 3.9–12.7)
WBC # BLD AUTO: 55.31 K/UL (ref 3.9–12.7)
WBC # BLD AUTO: 56.2 K/UL (ref 3.9–12.7)
WBC # BLD AUTO: 57.84 K/UL (ref 3.9–12.7)
WBC # BLD AUTO: 60.31 K/UL (ref 3.9–12.7)
WBC # BLD AUTO: 62.86 K/UL (ref 3.9–12.7)
WBC # BLD AUTO: 64.66 K/UL (ref 3.9–12.7)
WBC # BLD AUTO: 64.98 K/UL (ref 3.9–12.7)
WBC # BLD AUTO: 64.98 K/UL (ref 3.9–12.7)
WBC # BLD AUTO: 65.08 K/UL (ref 3.9–12.7)
WBC # BLD AUTO: 65.55 K/UL (ref 3.9–12.7)
WBC # BLD AUTO: 66.12 K/UL (ref 3.9–12.7)
WBC # BLD AUTO: 66.57 K/UL (ref 3.9–12.7)
WBC # BLD AUTO: 66.87 K/UL (ref 3.9–12.7)
WBC # BLD AUTO: 66.87 K/UL (ref 3.9–12.7)
WBC # BLD AUTO: 69.59 K/UL (ref 3.9–12.7)
WBC # BLD AUTO: 7.19 K/UL (ref 3.9–12.7)
WBC # BLD AUTO: 71.02 K/UL (ref 3.9–12.7)
WBC # BLD AUTO: 73.83 K/UL (ref 3.9–12.7)
WBC # BLD AUTO: 74.12 K/UL (ref 3.9–12.7)
WBC # BLD AUTO: 74.79 K/UL (ref 3.9–12.7)
WBC # BLD AUTO: 79.35 K/UL (ref 3.9–12.7)
WBC # BLD AUTO: 80.9 K/UL (ref 3.9–12.7)
WBC # BLD AUTO: 82.22 K/UL (ref 3.9–12.7)
WBC # BLD AUTO: 83.62 K/UL (ref 3.9–12.7)
WBC # BLD AUTO: 87.45 K/UL (ref 3.9–12.7)
WBC # BLD AUTO: 87.82 K/UL (ref 3.9–12.7)
WBC # SPEC AUTO: 13.2 X10(3)/MCL (ref 4.5–11)
WBC # SPEC AUTO: 2.1 X10(3)/MCL (ref 4.5–11)
WBC # SPEC AUTO: 2.4 X10(3)/MCL (ref 4.5–11)
WBC # SPEC AUTO: 2.5 X10(3)/MCL (ref 4.5–11)
WBC # SPEC AUTO: 2.5 X10(3)/MCL (ref 4.5–11)
WBC # SPEC AUTO: 2.6 X10(3)/MCL (ref 4.5–11)
WBC # SPEC AUTO: 2.7 X10(3)/MCL (ref 4.5–11)
WBC # SPEC AUTO: 2.8 X10(3)/MCL (ref 4.5–11)
WBC # SPEC AUTO: 2.8 X10(3)/MCL (ref 4.5–11)
WBC # SPEC AUTO: 2.9 X10(3)/MCL (ref 4.5–11)
WBC # SPEC AUTO: 3.1 X10(3)/MCL (ref 4.5–11)
WBC # SPEC AUTO: 3.2 X10(3)/MCL (ref 4.5–11)
WBC # SPEC AUTO: 3.3 X10(3)/MCL (ref 4.5–11)
WBC # SPEC AUTO: 3.4 X10(3)/MCL (ref 4.5–11)
WBC # SPEC AUTO: 3.4 X10(3)/MCL (ref 4.5–11)
WBC # SPEC AUTO: 3.6 X10(3)/MCL (ref 4.5–11)
WBC # SPEC AUTO: 3.8 X10(3)/MCL (ref 4.5–11)
WBC # SPEC AUTO: 3.9 X10(3)/MCL (ref 4.5–11)
WBC # SPEC AUTO: 4.1 X10(3)/MCL (ref 4.5–11)
WBC # SPEC AUTO: 4.1 X10(3)/MCL (ref 4.5–11)
WBC # SPEC AUTO: 6.1 X10(3)/MCL (ref 4.5–11)
WBC # SPEC AUTO: 7.9 X10(3)/MCL (ref 4.5–11)
WBC #/AREA URNS AUTO: 17 /HPF (ref 0–5)
WBC OTHER NFR BLD MANUAL: 52 %
WBC OTHER NFR BLD MANUAL: 56 %
WBC OTHER NFR BLD MANUAL: 62 %
WBC OTHER NFR BLD MANUAL: 73 %

## 2021-01-01 PROCEDURE — 85027 COMPLETE CBC AUTOMATED: CPT | Mod: 91 | Performed by: STUDENT IN AN ORGANIZED HEALTH CARE EDUCATION/TRAINING PROGRAM

## 2021-01-01 PROCEDURE — 83735 ASSAY OF MAGNESIUM: CPT | Performed by: NURSE PRACTITIONER

## 2021-01-01 PROCEDURE — 25000003 PHARM REV CODE 250: Performed by: STUDENT IN AN ORGANIZED HEALTH CARE EDUCATION/TRAINING PROGRAM

## 2021-01-01 PROCEDURE — 86140 C-REACTIVE PROTEIN: CPT | Performed by: STUDENT IN AN ORGANIZED HEALTH CARE EDUCATION/TRAINING PROGRAM

## 2021-01-01 PROCEDURE — 85730 THROMBOPLASTIN TIME PARTIAL: CPT | Performed by: NURSE PRACTITIONER

## 2021-01-01 PROCEDURE — 36415 COLL VENOUS BLD VENIPUNCTURE: CPT | Performed by: NURSE PRACTITIONER

## 2021-01-01 PROCEDURE — 80053 COMPREHEN METABOLIC PANEL: CPT | Performed by: STUDENT IN AN ORGANIZED HEALTH CARE EDUCATION/TRAINING PROGRAM

## 2021-01-01 PROCEDURE — 27000207 HC ISOLATION

## 2021-01-01 PROCEDURE — 95720 EEG PHY/QHP EA INCR W/VEEG: CPT | Mod: ,,, | Performed by: PSYCHIATRY & NEUROLOGY

## 2021-01-01 PROCEDURE — P9037 PLATE PHERES LEUKOREDU IRRAD: HCPCS | Performed by: STUDENT IN AN ORGANIZED HEALTH CARE EDUCATION/TRAINING PROGRAM

## 2021-01-01 PROCEDURE — 63600175 PHARM REV CODE 636 W HCPCS: Performed by: STUDENT IN AN ORGANIZED HEALTH CARE EDUCATION/TRAINING PROGRAM

## 2021-01-01 PROCEDURE — 25000242 PHARM REV CODE 250 ALT 637 W/ HCPCS: Performed by: STUDENT IN AN ORGANIZED HEALTH CARE EDUCATION/TRAINING PROGRAM

## 2021-01-01 PROCEDURE — 93010 EKG 12-LEAD: ICD-10-PCS | Mod: ,,, | Performed by: INTERNAL MEDICINE

## 2021-01-01 PROCEDURE — P9040 RBC LEUKOREDUCED IRRADIATED: HCPCS | Performed by: STUDENT IN AN ORGANIZED HEALTH CARE EDUCATION/TRAINING PROGRAM

## 2021-01-01 PROCEDURE — 84550 ASSAY OF BLOOD/URIC ACID: CPT | Mod: 91 | Performed by: FAMILY MEDICINE

## 2021-01-01 PROCEDURE — 27000221 HC OXYGEN, UP TO 24 HOURS

## 2021-01-01 PROCEDURE — 25000003 PHARM REV CODE 250: Performed by: NURSE PRACTITIONER

## 2021-01-01 PROCEDURE — 88342 IMHCHEM/IMCYTCHM 1ST ANTB: CPT | Mod: 26,59,, | Performed by: PATHOLOGY

## 2021-01-01 PROCEDURE — 27100171 HC OXYGEN HIGH FLOW UP TO 24 HOURS

## 2021-01-01 PROCEDURE — 94660 CPAP INITIATION&MGMT: CPT

## 2021-01-01 PROCEDURE — 85384 FIBRINOGEN ACTIVITY: CPT | Performed by: STUDENT IN AN ORGANIZED HEALTH CARE EDUCATION/TRAINING PROGRAM

## 2021-01-01 PROCEDURE — 99900035 HC TECH TIME PER 15 MIN (STAT)

## 2021-01-01 PROCEDURE — 82803 BLOOD GASES ANY COMBINATION: CPT

## 2021-01-01 PROCEDURE — 94761 N-INVAS EAR/PLS OXIMETRY MLT: CPT

## 2021-01-01 PROCEDURE — 88189 PR  FLOWCYTOMETRY/READ, 16 & > MARKERS: ICD-10-PCS | Mod: ,,, | Performed by: PATHOLOGY

## 2021-01-01 PROCEDURE — 99223 PR INITIAL HOSPITAL CARE,LEVL III: ICD-10-PCS | Mod: ,,, | Performed by: PSYCHIATRY & NEUROLOGY

## 2021-01-01 PROCEDURE — 85379 FIBRIN DEGRADATION QUANT: CPT | Performed by: STUDENT IN AN ORGANIZED HEALTH CARE EDUCATION/TRAINING PROGRAM

## 2021-01-01 PROCEDURE — 84100 ASSAY OF PHOSPHORUS: CPT | Performed by: STUDENT IN AN ORGANIZED HEALTH CARE EDUCATION/TRAINING PROGRAM

## 2021-01-01 PROCEDURE — P9040 RBC LEUKOREDUCED IRRADIATED: HCPCS | Performed by: INTERNAL MEDICINE

## 2021-01-01 PROCEDURE — 94640 AIRWAY INHALATION TREATMENT: CPT

## 2021-01-01 PROCEDURE — 36600 WITHDRAWAL OF ARTERIAL BLOOD: CPT

## 2021-01-01 PROCEDURE — 25000003 PHARM REV CODE 250

## 2021-01-01 PROCEDURE — 25000242 PHARM REV CODE 250 ALT 637 W/ HCPCS: Performed by: HOSPITALIST

## 2021-01-01 PROCEDURE — 99233 SBSQ HOSP IP/OBS HIGH 50: CPT | Mod: ,,, | Performed by: INTERNAL MEDICINE

## 2021-01-01 PROCEDURE — 20600001 HC STEP DOWN PRIVATE ROOM

## 2021-01-01 PROCEDURE — 25000003 PHARM REV CODE 250: Performed by: INTERNAL MEDICINE

## 2021-01-01 PROCEDURE — 85007 BL SMEAR W/DIFF WBC COUNT: CPT | Performed by: STUDENT IN AN ORGANIZED HEALTH CARE EDUCATION/TRAINING PROGRAM

## 2021-01-01 PROCEDURE — 37799 UNLISTED PX VASCULAR SURGERY: CPT

## 2021-01-01 PROCEDURE — 83735 ASSAY OF MAGNESIUM: CPT | Performed by: INTERNAL MEDICINE

## 2021-01-01 PROCEDURE — 36415 COLL VENOUS BLD VENIPUNCTURE: CPT | Performed by: INTERNAL MEDICINE

## 2021-01-01 PROCEDURE — 84100 ASSAY OF PHOSPHORUS: CPT | Performed by: NURSE PRACTITIONER

## 2021-01-01 PROCEDURE — 85007 BL SMEAR W/DIFF WBC COUNT: CPT | Mod: 91 | Performed by: INTERNAL MEDICINE

## 2021-01-01 PROCEDURE — 63600175 PHARM REV CODE 636 W HCPCS: Performed by: INTERNAL MEDICINE

## 2021-01-01 PROCEDURE — 84100 ASSAY OF PHOSPHORUS: CPT | Performed by: INTERNAL MEDICINE

## 2021-01-01 PROCEDURE — 85014 HEMATOCRIT: CPT

## 2021-01-01 PROCEDURE — 84550 ASSAY OF BLOOD/URIC ACID: CPT | Performed by: FAMILY MEDICINE

## 2021-01-01 PROCEDURE — 99233 PR SUBSEQUENT HOSPITAL CARE,LEVL III: ICD-10-PCS | Mod: CR,,, | Performed by: ORTHOPAEDIC SURGERY

## 2021-01-01 PROCEDURE — 85610 PROTHROMBIN TIME: CPT | Performed by: NURSE PRACTITIONER

## 2021-01-01 PROCEDURE — 25000003 PHARM REV CODE 250: Performed by: PHYSICIAN ASSISTANT

## 2021-01-01 PROCEDURE — 85027 COMPLETE CBC AUTOMATED: CPT | Performed by: NURSE PRACTITIONER

## 2021-01-01 PROCEDURE — 84550 ASSAY OF BLOOD/URIC ACID: CPT | Mod: 91 | Performed by: NURSE PRACTITIONER

## 2021-01-01 PROCEDURE — 84100 ASSAY OF PHOSPHORUS: CPT | Mod: 91 | Performed by: NURSE PRACTITIONER

## 2021-01-01 PROCEDURE — 85027 COMPLETE CBC AUTOMATED: CPT | Mod: 91 | Performed by: INTERNAL MEDICINE

## 2021-01-01 PROCEDURE — 80053 COMPREHEN METABOLIC PANEL: CPT | Mod: 91 | Performed by: NURSE PRACTITIONER

## 2021-01-01 PROCEDURE — 99900017 HC EXTUBATION W/PARAMETERS (STAT)

## 2021-01-01 PROCEDURE — 94002 VENT MGMT INPAT INIT DAY: CPT

## 2021-01-01 PROCEDURE — 99233 SBSQ HOSP IP/OBS HIGH 50: CPT | Mod: CR,,, | Performed by: INTERNAL MEDICINE

## 2021-01-01 PROCEDURE — 88275 CYTOGENETICS 100-300: CPT | Performed by: STUDENT IN AN ORGANIZED HEALTH CARE EDUCATION/TRAINING PROGRAM

## 2021-01-01 PROCEDURE — 99233 SBSQ HOSP IP/OBS HIGH 50: CPT | Mod: ,,, | Performed by: EMERGENCY MEDICINE

## 2021-01-01 PROCEDURE — 82728 ASSAY OF FERRITIN: CPT | Performed by: STUDENT IN AN ORGANIZED HEALTH CARE EDUCATION/TRAINING PROGRAM

## 2021-01-01 PROCEDURE — 63600175 PHARM REV CODE 636 W HCPCS

## 2021-01-01 PROCEDURE — 85027 COMPLETE CBC AUTOMATED: CPT | Performed by: INTERNAL MEDICINE

## 2021-01-01 PROCEDURE — 85384 FIBRINOGEN ACTIVITY: CPT | Performed by: INTERNAL MEDICINE

## 2021-01-01 PROCEDURE — 83735 ASSAY OF MAGNESIUM: CPT | Performed by: STUDENT IN AN ORGANIZED HEALTH CARE EDUCATION/TRAINING PROGRAM

## 2021-01-01 PROCEDURE — 81450 HL NEO GSAP 5-50DNA/DNA&RNA: CPT | Performed by: STUDENT IN AN ORGANIZED HEALTH CARE EDUCATION/TRAINING PROGRAM

## 2021-01-01 PROCEDURE — 88299 UNLISTED CYTOGENETIC STUDY: CPT | Performed by: NURSE PRACTITIONER

## 2021-01-01 PROCEDURE — 84550 ASSAY OF BLOOD/URIC ACID: CPT | Performed by: INTERNAL MEDICINE

## 2021-01-01 PROCEDURE — 84550 ASSAY OF BLOOD/URIC ACID: CPT | Mod: 91 | Performed by: INTERNAL MEDICINE

## 2021-01-01 PROCEDURE — 99900026 HC AIRWAY MAINTENANCE (STAT)

## 2021-01-01 PROCEDURE — 88311 DECALCIFY TISSUE: CPT | Performed by: PATHOLOGY

## 2021-01-01 PROCEDURE — 85610 PROTHROMBIN TIME: CPT | Performed by: INTERNAL MEDICINE

## 2021-01-01 PROCEDURE — 99497 PR ADVNCD CARE PLAN 30 MIN: ICD-10-PCS | Mod: ,,, | Performed by: EMERGENCY MEDICINE

## 2021-01-01 PROCEDURE — P9021 RED BLOOD CELLS UNIT: HCPCS | Performed by: STUDENT IN AN ORGANIZED HEALTH CARE EDUCATION/TRAINING PROGRAM

## 2021-01-01 PROCEDURE — 83615 LACTATE (LD) (LDH) ENZYME: CPT | Performed by: STUDENT IN AN ORGANIZED HEALTH CARE EDUCATION/TRAINING PROGRAM

## 2021-01-01 PROCEDURE — 85007 BL SMEAR W/DIFF WBC COUNT: CPT | Performed by: NURSE PRACTITIONER

## 2021-01-01 PROCEDURE — 97116 GAIT TRAINING THERAPY: CPT

## 2021-01-01 PROCEDURE — 99233 SBSQ HOSP IP/OBS HIGH 50: CPT | Mod: CR,GC,, | Performed by: INTERNAL MEDICINE

## 2021-01-01 PROCEDURE — 95813 PR EEG, EXTENDED, 61-119 MINS: ICD-10-PCS | Mod: 26,,, | Performed by: PSYCHIATRY & NEUROLOGY

## 2021-01-01 PROCEDURE — 88271 CYTOGENETICS DNA PROBE: CPT | Mod: 59 | Performed by: STUDENT IN AN ORGANIZED HEALTH CARE EDUCATION/TRAINING PROGRAM

## 2021-01-01 PROCEDURE — 80053 COMPREHEN METABOLIC PANEL: CPT | Performed by: INTERNAL MEDICINE

## 2021-01-01 PROCEDURE — 88313 SPECIAL STAINS GROUP 2: CPT | Mod: 59 | Performed by: PATHOLOGY

## 2021-01-01 PROCEDURE — 88271 CYTOGENETICS DNA PROBE: CPT | Performed by: NURSE PRACTITIONER

## 2021-01-01 PROCEDURE — 85097 PR  BONE MARROW,SMEAR INTERPRETATION: ICD-10-PCS | Mod: ,,, | Performed by: PATHOLOGY

## 2021-01-01 PROCEDURE — 82330 ASSAY OF CALCIUM: CPT

## 2021-01-01 PROCEDURE — 94799 UNLISTED PULMONARY SVC/PX: CPT

## 2021-01-01 PROCEDURE — 99233 SBSQ HOSP IP/OBS HIGH 50: CPT | Mod: GC,,, | Performed by: INTERNAL MEDICINE

## 2021-01-01 PROCEDURE — 95714 VEEG EA 12-26 HR UNMNTR: CPT

## 2021-01-01 PROCEDURE — 63600175 PHARM REV CODE 636 W HCPCS: Performed by: SURGERY

## 2021-01-01 PROCEDURE — 83605 ASSAY OF LACTIC ACID: CPT

## 2021-01-01 PROCEDURE — 85007 BL SMEAR W/DIFF WBC COUNT: CPT | Mod: 91 | Performed by: STUDENT IN AN ORGANIZED HEALTH CARE EDUCATION/TRAINING PROGRAM

## 2021-01-01 PROCEDURE — 85027 COMPLETE CBC AUTOMATED: CPT | Mod: 91 | Performed by: NURSE PRACTITIONER

## 2021-01-01 PROCEDURE — 99291 CRITICAL CARE FIRST HOUR: CPT | Mod: CR,GC,, | Performed by: INTERNAL MEDICINE

## 2021-01-01 PROCEDURE — 86900 BLOOD TYPING SEROLOGIC ABO: CPT | Performed by: INTERNAL MEDICINE

## 2021-01-01 PROCEDURE — 99233 PR SUBSEQUENT HOSPITAL CARE,LEVL III: ICD-10-PCS | Mod: CR,,, | Performed by: INTERNAL MEDICINE

## 2021-01-01 PROCEDURE — 83615 LACTATE (LD) (LDH) ENZYME: CPT | Performed by: INTERNAL MEDICINE

## 2021-01-01 PROCEDURE — 83735 ASSAY OF MAGNESIUM: CPT | Mod: 91 | Performed by: NURSE PRACTITIONER

## 2021-01-01 PROCEDURE — 85730 THROMBOPLASTIN TIME PARTIAL: CPT | Performed by: INTERNAL MEDICINE

## 2021-01-01 PROCEDURE — 20000000 HC ICU ROOM

## 2021-01-01 PROCEDURE — 86920 COMPATIBILITY TEST SPIN: CPT | Performed by: STUDENT IN AN ORGANIZED HEALTH CARE EDUCATION/TRAINING PROGRAM

## 2021-01-01 PROCEDURE — 80202 ASSAY OF VANCOMYCIN: CPT | Performed by: SURGERY

## 2021-01-01 PROCEDURE — 25000003 PHARM REV CODE 250: Performed by: SURGERY

## 2021-01-01 PROCEDURE — 88305 TISSUE EXAM BY PATHOLOGIST: CPT | Performed by: PATHOLOGY

## 2021-01-01 PROCEDURE — 99233 PR SUBSEQUENT HOSPITAL CARE,LEVL III: ICD-10-PCS | Mod: GC,,, | Performed by: INTERNAL MEDICINE

## 2021-01-01 PROCEDURE — 63600175 PHARM REV CODE 636 W HCPCS: Performed by: HOSPITALIST

## 2021-01-01 PROCEDURE — 99205 PR OFFICE/OUTPT VISIT, NEW, LEVL V, 60-74 MIN: ICD-10-PCS | Mod: 95,GC,, | Performed by: STUDENT IN AN ORGANIZED HEALTH CARE EDUCATION/TRAINING PROGRAM

## 2021-01-01 PROCEDURE — 92950 PR HEART/LUNG RESUSCITATION (CPR): ICD-10-PCS | Mod: GC,,, | Performed by: INTERNAL MEDICINE

## 2021-01-01 PROCEDURE — 94003 VENT MGMT INPAT SUBQ DAY: CPT

## 2021-01-01 PROCEDURE — 36415 COLL VENOUS BLD VENIPUNCTURE: CPT | Performed by: FAMILY MEDICINE

## 2021-01-01 PROCEDURE — 84295 ASSAY OF SERUM SODIUM: CPT

## 2021-01-01 PROCEDURE — 25000003 PHARM REV CODE 250: Performed by: HOSPITALIST

## 2021-01-01 PROCEDURE — 84550 ASSAY OF BLOOD/URIC ACID: CPT | Performed by: NURSE PRACTITIONER

## 2021-01-01 PROCEDURE — 86900 BLOOD TYPING SEROLOGIC ABO: CPT | Performed by: STUDENT IN AN ORGANIZED HEALTH CARE EDUCATION/TRAINING PROGRAM

## 2021-01-01 PROCEDURE — 97168 OT RE-EVAL EST PLAN CARE: CPT

## 2021-01-01 PROCEDURE — 85384 FIBRINOGEN ACTIVITY: CPT | Performed by: NURSE PRACTITIONER

## 2021-01-01 PROCEDURE — 95720 PR EEG, W/VIDEO, CONT RECORD, I&R, >12<26 HRS: ICD-10-PCS | Mod: ,,, | Performed by: PSYCHIATRY & NEUROLOGY

## 2021-01-01 PROCEDURE — 36415 COLL VENOUS BLD VENIPUNCTURE: CPT | Performed by: STUDENT IN AN ORGANIZED HEALTH CARE EDUCATION/TRAINING PROGRAM

## 2021-01-01 PROCEDURE — 99222 PR INITIAL HOSPITAL CARE,LEVL II: ICD-10-PCS | Mod: GC,,, | Performed by: PSYCHIATRY & NEUROLOGY

## 2021-01-01 PROCEDURE — 93005 ELECTROCARDIOGRAM TRACING: CPT

## 2021-01-01 PROCEDURE — P9040 RBC LEUKOREDUCED IRRADIATED: HCPCS | Performed by: NURSE PRACTITIONER

## 2021-01-01 PROCEDURE — 85060 PATHOLOGIST REVIEW: ICD-10-PCS | Mod: CR,,, | Performed by: PATHOLOGY

## 2021-01-01 PROCEDURE — 85007 BL SMEAR W/DIFF WBC COUNT: CPT | Performed by: INTERNAL MEDICINE

## 2021-01-01 PROCEDURE — 80053 COMPREHEN METABOLIC PANEL: CPT | Performed by: NURSE PRACTITIONER

## 2021-01-01 PROCEDURE — 88184 FLOWCYTOMETRY/ TC 1 MARKER: CPT | Performed by: PATHOLOGY

## 2021-01-01 PROCEDURE — 84484 ASSAY OF TROPONIN QUANT: CPT | Performed by: STUDENT IN AN ORGANIZED HEALTH CARE EDUCATION/TRAINING PROGRAM

## 2021-01-01 PROCEDURE — 88341 IMHCHEM/IMCYTCHM EA ADD ANTB: CPT | Mod: 59 | Performed by: PATHOLOGY

## 2021-01-01 PROCEDURE — 97165 OT EVAL LOW COMPLEX 30 MIN: CPT

## 2021-01-01 PROCEDURE — 99291 CRITICAL CARE FIRST HOUR: CPT | Mod: GC,,, | Performed by: STUDENT IN AN ORGANIZED HEALTH CARE EDUCATION/TRAINING PROGRAM

## 2021-01-01 PROCEDURE — 63600175 PHARM REV CODE 636 W HCPCS: Mod: JG | Performed by: INTERNAL MEDICINE

## 2021-01-01 PROCEDURE — 82565 ASSAY OF CREATININE: CPT

## 2021-01-01 PROCEDURE — 85652 RBC SED RATE AUTOMATED: CPT | Performed by: STUDENT IN AN ORGANIZED HEALTH CARE EDUCATION/TRAINING PROGRAM

## 2021-01-01 PROCEDURE — 99233 PR SUBSEQUENT HOSPITAL CARE,LEVL III: ICD-10-PCS | Mod: ,,, | Performed by: INTERNAL MEDICINE

## 2021-01-01 PROCEDURE — 99233 SBSQ HOSP IP/OBS HIGH 50: CPT | Mod: CR,,, | Performed by: ORTHOPAEDIC SURGERY

## 2021-01-01 PROCEDURE — 99222 1ST HOSP IP/OBS MODERATE 55: CPT | Mod: GC,,, | Performed by: PSYCHIATRY & NEUROLOGY

## 2021-01-01 PROCEDURE — 99233 PR SUBSEQUENT HOSPITAL CARE,LEVL III: ICD-10-PCS | Mod: CR,,, | Performed by: PHYSICIAN ASSISTANT

## 2021-01-01 PROCEDURE — 88342 IMHCHEM/IMCYTCHM 1ST ANTB: CPT | Performed by: PATHOLOGY

## 2021-01-01 PROCEDURE — 25500020 PHARM REV CODE 255: Performed by: INTERNAL MEDICINE

## 2021-01-01 PROCEDURE — 92950 HEART/LUNG RESUSCITATION CPR: CPT | Mod: GC,,, | Performed by: INTERNAL MEDICINE

## 2021-01-01 PROCEDURE — 84550 ASSAY OF BLOOD/URIC ACID: CPT | Performed by: STUDENT IN AN ORGANIZED HEALTH CARE EDUCATION/TRAINING PROGRAM

## 2021-01-01 PROCEDURE — 84132 ASSAY OF SERUM POTASSIUM: CPT

## 2021-01-01 PROCEDURE — 36430 TRANSFUSION BLD/BLD COMPNT: CPT

## 2021-01-01 PROCEDURE — 38222 DX BONE MARROW BX & ASPIR: CPT | Mod: CR,RT,, | Performed by: NURSE PRACTITIONER

## 2021-01-01 PROCEDURE — 85060 BLOOD SMEAR INTERPRETATION: CPT | Mod: ,,, | Performed by: PATHOLOGY

## 2021-01-01 PROCEDURE — 97164 PT RE-EVAL EST PLAN CARE: CPT

## 2021-01-01 PROCEDURE — 95813 EEG EXTND MNTR 61-119 MIN: CPT | Mod: 26,,, | Performed by: PSYCHIATRY & NEUROLOGY

## 2021-01-01 PROCEDURE — 83880 ASSAY OF NATRIURETIC PEPTIDE: CPT | Performed by: STUDENT IN AN ORGANIZED HEALTH CARE EDUCATION/TRAINING PROGRAM

## 2021-01-01 PROCEDURE — 84550 ASSAY OF BLOOD/URIC ACID: CPT | Mod: 91 | Performed by: STUDENT IN AN ORGANIZED HEALTH CARE EDUCATION/TRAINING PROGRAM

## 2021-01-01 PROCEDURE — P9045 ALBUMIN (HUMAN), 5%, 250 ML: HCPCS | Mod: JG

## 2021-01-01 PROCEDURE — 97161 PT EVAL LOW COMPLEX 20 MIN: CPT

## 2021-01-01 PROCEDURE — A4216 STERILE WATER/SALINE, 10 ML: HCPCS | Performed by: STUDENT IN AN ORGANIZED HEALTH CARE EDUCATION/TRAINING PROGRAM

## 2021-01-01 PROCEDURE — 99291 CRITICAL CARE FIRST HOUR: CPT | Mod: GC,,, | Performed by: SURGERY

## 2021-01-01 PROCEDURE — 99223 1ST HOSP IP/OBS HIGH 75: CPT | Mod: ,,, | Performed by: PSYCHIATRY & NEUROLOGY

## 2021-01-01 PROCEDURE — 85097 BONE MARROW INTERPRETATION: CPT | Mod: ,,, | Performed by: PATHOLOGY

## 2021-01-01 PROCEDURE — 36556 PR INSERT NON-TUNNEL CV CATH 5+ YRS OLD: ICD-10-PCS | Mod: ,,, | Performed by: NURSE PRACTITIONER

## 2021-01-01 PROCEDURE — 88341 PR IHC OR ICC EACH ADD'L SINGLE ANTIBODY  STAINPR: ICD-10-PCS | Mod: 26,59,, | Performed by: PATHOLOGY

## 2021-01-01 PROCEDURE — 88342 CHG IMMUNOCYTOCHEMISTRY: ICD-10-PCS | Mod: 26,59,, | Performed by: PATHOLOGY

## 2021-01-01 PROCEDURE — 63600175 PHARM REV CODE 636 W HCPCS: Performed by: EMERGENCY MEDICINE

## 2021-01-01 PROCEDURE — 99291 PR CRITICAL CARE, E/M 30-74 MINUTES: ICD-10-PCS | Mod: GC,,, | Performed by: SURGERY

## 2021-01-01 PROCEDURE — 88313 PR  SPECIAL STAINS,GROUP II: ICD-10-PCS | Mod: 26,,, | Performed by: PATHOLOGY

## 2021-01-01 PROCEDURE — 87040 BLOOD CULTURE FOR BACTERIA: CPT | Performed by: STUDENT IN AN ORGANIZED HEALTH CARE EDUCATION/TRAINING PROGRAM

## 2021-01-01 PROCEDURE — 85379 FIBRIN DEGRADATION QUANT: CPT | Performed by: INTERNAL MEDICINE

## 2021-01-01 PROCEDURE — 97530 THERAPEUTIC ACTIVITIES: CPT

## 2021-01-01 PROCEDURE — 97530 THERAPEUTIC ACTIVITIES: CPT | Mod: CO

## 2021-01-01 PROCEDURE — 85027 COMPLETE CBC AUTOMATED: CPT | Mod: 91 | Performed by: SURGERY

## 2021-01-01 PROCEDURE — 86920 COMPATIBILITY TEST SPIN: CPT | Performed by: NURSE PRACTITIONER

## 2021-01-01 PROCEDURE — 27000190 HC CPAP FULL FACE MASK W/VALVE

## 2021-01-01 PROCEDURE — 84484 ASSAY OF TROPONIN QUANT: CPT | Performed by: INTERNAL MEDICINE

## 2021-01-01 PROCEDURE — 88313 SPECIAL STAINS GROUP 2: CPT | Mod: 26,,, | Performed by: PATHOLOGY

## 2021-01-01 PROCEDURE — 87086 URINE CULTURE/COLONY COUNT: CPT | Performed by: STUDENT IN AN ORGANIZED HEALTH CARE EDUCATION/TRAINING PROGRAM

## 2021-01-01 PROCEDURE — 83880 ASSAY OF NATRIURETIC PEPTIDE: CPT | Performed by: INTERNAL MEDICINE

## 2021-01-01 PROCEDURE — 85027 COMPLETE CBC AUTOMATED: CPT | Performed by: STUDENT IN AN ORGANIZED HEALTH CARE EDUCATION/TRAINING PROGRAM

## 2021-01-01 PROCEDURE — 36620 PR INSERT CATH,ART,PERCUT,SHORTTERM: ICD-10-PCS | Mod: 59,,, | Performed by: NURSE PRACTITIONER

## 2021-01-01 PROCEDURE — 81245 FLT3 GENE: CPT | Performed by: STUDENT IN AN ORGANIZED HEALTH CARE EDUCATION/TRAINING PROGRAM

## 2021-01-01 PROCEDURE — 84132 ASSAY OF SERUM POTASSIUM: CPT | Performed by: STUDENT IN AN ORGANIZED HEALTH CARE EDUCATION/TRAINING PROGRAM

## 2021-01-01 PROCEDURE — 99291 PR CRITICAL CARE, E/M 30-74 MINUTES: ICD-10-PCS | Mod: GC,,, | Performed by: STUDENT IN AN ORGANIZED HEALTH CARE EDUCATION/TRAINING PROGRAM

## 2021-01-01 PROCEDURE — 31500 INSERT EMERGENCY AIRWAY: CPT | Performed by: STUDENT IN AN ORGANIZED HEALTH CARE EDUCATION/TRAINING PROGRAM

## 2021-01-01 PROCEDURE — 88264 CHROMOSOME ANALYSIS 20-25: CPT | Performed by: STUDENT IN AN ORGANIZED HEALTH CARE EDUCATION/TRAINING PROGRAM

## 2021-01-01 PROCEDURE — 99497 ADVNCD CARE PLAN 30 MIN: CPT | Mod: ,,, | Performed by: EMERGENCY MEDICINE

## 2021-01-01 PROCEDURE — 99233 PR SUBSEQUENT HOSPITAL CARE,LEVL III: ICD-10-PCS | Mod: CR,,, | Performed by: EMERGENCY MEDICINE

## 2021-01-01 PROCEDURE — U0003 INFECTIOUS AGENT DETECTION BY NUCLEIC ACID (DNA OR RNA); SEVERE ACUTE RESPIRATORY SYNDROME CORONAVIRUS 2 (SARS-COV-2) (CORONAVIRUS DISEASE [COVID-19]), AMPLIFIED PROBE TECHNIQUE, MAKING USE OF HIGH THROUGHPUT TECHNOLOGIES AS DESCRIBED BY CMS-2020-01-R: HCPCS | Performed by: STUDENT IN AN ORGANIZED HEALTH CARE EDUCATION/TRAINING PROGRAM

## 2021-01-01 PROCEDURE — 82550 ASSAY OF CK (CPK): CPT | Performed by: SURGERY

## 2021-01-01 PROCEDURE — 81310 NPM1 GENE: CPT | Performed by: STUDENT IN AN ORGANIZED HEALTH CARE EDUCATION/TRAINING PROGRAM

## 2021-01-01 PROCEDURE — 82728 ASSAY OF FERRITIN: CPT | Performed by: INTERNAL MEDICINE

## 2021-01-01 PROCEDURE — 99233 PR SUBSEQUENT HOSPITAL CARE,LEVL III: ICD-10-PCS | Mod: 95,CR,, | Performed by: INTERNAL MEDICINE

## 2021-01-01 PROCEDURE — 88237 TISSUE CULTURE BONE MARROW: CPT | Performed by: STUDENT IN AN ORGANIZED HEALTH CARE EDUCATION/TRAINING PROGRAM

## 2021-01-01 PROCEDURE — 95700 EEG CONT REC W/VID EEG TECH: CPT

## 2021-01-01 PROCEDURE — 86920 COMPATIBILITY TEST SPIN: CPT | Performed by: INTERNAL MEDICINE

## 2021-01-01 PROCEDURE — 93010 ELECTROCARDIOGRAM REPORT: CPT | Mod: ,,, | Performed by: INTERNAL MEDICINE

## 2021-01-01 PROCEDURE — 83605 ASSAY OF LACTIC ACID: CPT | Performed by: INTERNAL MEDICINE

## 2021-01-01 PROCEDURE — 99205 OFFICE O/P NEW HI 60 MIN: CPT | Mod: 95,GC,, | Performed by: STUDENT IN AN ORGANIZED HEALTH CARE EDUCATION/TRAINING PROGRAM

## 2021-01-01 PROCEDURE — P9045 ALBUMIN (HUMAN), 5%, 250 ML: HCPCS | Mod: JG | Performed by: STUDENT IN AN ORGANIZED HEALTH CARE EDUCATION/TRAINING PROGRAM

## 2021-01-01 PROCEDURE — 99233 PR SUBSEQUENT HOSPITAL CARE,LEVL III: ICD-10-PCS | Mod: ,,, | Performed by: EMERGENCY MEDICINE

## 2021-01-01 PROCEDURE — 99233 SBSQ HOSP IP/OBS HIGH 50: CPT | Mod: 95,CR,, | Performed by: INTERNAL MEDICINE

## 2021-01-01 PROCEDURE — 85007 BL SMEAR W/DIFF WBC COUNT: CPT | Mod: 91 | Performed by: NURSE PRACTITIONER

## 2021-01-01 PROCEDURE — 88311 DECALCIFY TISSUE: CPT | Mod: 26,,, | Performed by: PATHOLOGY

## 2021-01-01 PROCEDURE — 63600175 PHARM REV CODE 636 W HCPCS: Performed by: NURSE PRACTITIONER

## 2021-01-01 PROCEDURE — 88185 FLOWCYTOMETRY/TC ADD-ON: CPT | Performed by: PATHOLOGY

## 2021-01-01 PROCEDURE — 85060 PATHOLOGIST REVIEW: ICD-10-PCS | Mod: ,,, | Performed by: PATHOLOGY

## 2021-01-01 PROCEDURE — 88305 TISSUE EXAM BY PATHOLOGIST: CPT | Mod: 26,,, | Performed by: PATHOLOGY

## 2021-01-01 PROCEDURE — 99233 SBSQ HOSP IP/OBS HIGH 50: CPT | Mod: CR,,, | Performed by: PHYSICIAN ASSISTANT

## 2021-01-01 PROCEDURE — 97535 SELF CARE MNGMENT TRAINING: CPT | Mod: CO

## 2021-01-01 PROCEDURE — 85060 BLOOD SMEAR INTERPRETATION: CPT | Mod: CR,,, | Performed by: PATHOLOGY

## 2021-01-01 PROCEDURE — 99233 SBSQ HOSP IP/OBS HIGH 50: CPT | Mod: CR,,, | Performed by: EMERGENCY MEDICINE

## 2021-01-01 PROCEDURE — 80048 BASIC METABOLIC PNL TOTAL CA: CPT | Performed by: INTERNAL MEDICINE

## 2021-01-01 PROCEDURE — 86920 COMPATIBILITY TEST SPIN: CPT

## 2021-01-01 PROCEDURE — 97535 SELF CARE MNGMENT TRAINING: CPT

## 2021-01-01 PROCEDURE — P9037 PLATE PHERES LEUKOREDU IRRAD: HCPCS | Performed by: INTERNAL MEDICINE

## 2021-01-01 PROCEDURE — 80048 BASIC METABOLIC PNL TOTAL CA: CPT | Performed by: SURGERY

## 2021-01-01 PROCEDURE — 88237 TISSUE CULTURE BONE MARROW: CPT | Performed by: NURSE PRACTITIONER

## 2021-01-01 PROCEDURE — 81001 URINALYSIS AUTO W/SCOPE: CPT | Performed by: STUDENT IN AN ORGANIZED HEALTH CARE EDUCATION/TRAINING PROGRAM

## 2021-01-01 PROCEDURE — 81450 HL NEO GSAP 5-50DNA/DNA&RNA: CPT | Performed by: NURSE PRACTITIONER

## 2021-01-01 PROCEDURE — 99233 PR SUBSEQUENT HOSPITAL CARE,LEVL III: ICD-10-PCS | Mod: CR,GC,, | Performed by: INTERNAL MEDICINE

## 2021-01-01 PROCEDURE — 88305 TISSUE EXAM BY PATHOLOGIST: ICD-10-PCS | Mod: 26,,, | Performed by: PATHOLOGY

## 2021-01-01 PROCEDURE — 27000200 HC HIGH FLOW DEL DISP CIRCUIT

## 2021-01-01 PROCEDURE — 87040 BLOOD CULTURE FOR BACTERIA: CPT | Performed by: INTERNAL MEDICINE

## 2021-01-01 PROCEDURE — 88341 IMHCHEM/IMCYTCHM EA ADD ANTB: CPT | Mod: 26,59,, | Performed by: PATHOLOGY

## 2021-01-01 PROCEDURE — U0005 INFEC AGEN DETEC AMPLI PROBE: HCPCS | Performed by: STUDENT IN AN ORGANIZED HEALTH CARE EDUCATION/TRAINING PROGRAM

## 2021-01-01 PROCEDURE — 63600175 PHARM REV CODE 636 W HCPCS: Mod: JG

## 2021-01-01 PROCEDURE — 86900 BLOOD TYPING SEROLOGIC ABO: CPT | Performed by: SURGERY

## 2021-01-01 PROCEDURE — 99291 PR CRITICAL CARE, E/M 30-74 MINUTES: ICD-10-PCS | Mod: CR,GC,, | Performed by: INTERNAL MEDICINE

## 2021-01-01 PROCEDURE — 88311 PR  DECALCIFY TISSUE: ICD-10-PCS | Mod: 26,,, | Performed by: PATHOLOGY

## 2021-01-01 PROCEDURE — 88189 FLOWCYTOMETRY/READ 16 & >: CPT | Mod: ,,, | Performed by: PATHOLOGY

## 2021-01-01 PROCEDURE — 99223 PR INITIAL HOSPITAL CARE,LEVL III: ICD-10-PCS | Mod: ,,, | Performed by: INTERNAL MEDICINE

## 2021-01-01 PROCEDURE — 38222 PR BONE MARROW BIOPSY(IES) W/ASPIRATION(S); DIAGNOSTIC: ICD-10-PCS | Mod: CR,RT,, | Performed by: NURSE PRACTITIONER

## 2021-01-01 PROCEDURE — 36556 INSERT NON-TUNNEL CV CATH: CPT | Mod: ,,, | Performed by: NURSE PRACTITIONER

## 2021-01-01 PROCEDURE — 99223 1ST HOSP IP/OBS HIGH 75: CPT | Mod: ,,, | Performed by: INTERNAL MEDICINE

## 2021-01-01 PROCEDURE — 84466 ASSAY OF TRANSFERRIN: CPT | Performed by: STUDENT IN AN ORGANIZED HEALTH CARE EDUCATION/TRAINING PROGRAM

## 2021-01-01 PROCEDURE — 82955 ASSAY OF G6PD ENZYME: CPT | Performed by: STUDENT IN AN ORGANIZED HEALTH CARE EDUCATION/TRAINING PROGRAM

## 2021-01-01 PROCEDURE — 85007 BL SMEAR W/DIFF WBC COUNT: CPT | Mod: 91 | Performed by: SURGERY

## 2021-01-01 PROCEDURE — 36620 INSERTION CATHETER ARTERY: CPT | Mod: 59,,, | Performed by: NURSE PRACTITIONER

## 2021-01-01 RX ORDER — POTASSIUM CHLORIDE 29.8 MG/ML
40 INJECTION INTRAVENOUS EVERY 6 HOURS
Status: DISCONTINUED | OUTPATIENT
Start: 2021-01-01 | End: 2021-01-01

## 2021-01-01 RX ORDER — ASPIRIN 325 MG
325 TABLET, DELAYED RELEASE (ENTERIC COATED) ORAL DAILY
Status: DISCONTINUED | OUTPATIENT
Start: 2021-01-01 | End: 2021-01-01

## 2021-01-01 RX ORDER — FUROSEMIDE 10 MG/ML
40 INJECTION INTRAMUSCULAR; INTRAVENOUS DAILY
Status: DISCONTINUED | OUTPATIENT
Start: 2021-01-01 | End: 2021-01-01

## 2021-01-01 RX ORDER — MAGNESIUM SULFATE HEPTAHYDRATE 40 MG/ML
2 INJECTION, SOLUTION INTRAVENOUS ONCE
Status: COMPLETED | OUTPATIENT
Start: 2021-01-01 | End: 2021-01-01

## 2021-01-01 RX ORDER — SODIUM CHLORIDE 9 MG/ML
INJECTION, SOLUTION INTRAVENOUS CONTINUOUS
Status: DISCONTINUED | OUTPATIENT
Start: 2021-01-01 | End: 2021-01-01

## 2021-01-01 RX ORDER — FUROSEMIDE 10 MG/ML
40 INJECTION INTRAMUSCULAR; INTRAVENOUS 2 TIMES DAILY
Status: DISCONTINUED | OUTPATIENT
Start: 2021-01-01 | End: 2021-01-01

## 2021-01-01 RX ORDER — METOPROLOL TARTRATE 50 MG/1
100 TABLET ORAL 2 TIMES DAILY
Status: DISCONTINUED | OUTPATIENT
Start: 2021-01-01 | End: 2021-01-01

## 2021-01-01 RX ORDER — POTASSIUM CHLORIDE 14.9 MG/ML
20 INJECTION INTRAVENOUS ONCE
Status: COMPLETED | OUTPATIENT
Start: 2021-01-01 | End: 2021-01-01

## 2021-01-01 RX ORDER — FUROSEMIDE 10 MG/ML
80 INJECTION INTRAMUSCULAR; INTRAVENOUS NIGHTLY
Status: DISCONTINUED | OUTPATIENT
Start: 2021-01-01 | End: 2021-01-01

## 2021-01-01 RX ORDER — DIPHENHYDRAMINE HCL 25 MG
25 CAPSULE ORAL EVERY 6 HOURS PRN
Status: DISCONTINUED | OUTPATIENT
Start: 2021-01-01 | End: 2021-01-01

## 2021-01-01 RX ORDER — LANOLIN ALCOHOL/MO/W.PET/CERES
800 CREAM (GRAM) TOPICAL ONCE
Status: COMPLETED | OUTPATIENT
Start: 2021-01-01 | End: 2021-01-01

## 2021-01-01 RX ORDER — SEVELAMER CARBONATE 800 MG/1
1600 TABLET, FILM COATED ORAL
Status: DISCONTINUED | OUTPATIENT
Start: 2021-01-01 | End: 2021-01-01

## 2021-01-01 RX ORDER — SODIUM CHLORIDE 0.9 % (FLUSH) 0.9 %
10 SYRINGE (ML) INJECTION
Status: CANCELLED | OUTPATIENT
Start: 2021-01-01

## 2021-01-01 RX ORDER — LORAZEPAM 2 MG/ML
2 INJECTION INTRAMUSCULAR ONCE
Status: COMPLETED | OUTPATIENT
Start: 2021-01-01 | End: 2021-01-01

## 2021-01-01 RX ORDER — ONDANSETRON 2 MG/ML
8 INJECTION INTRAMUSCULAR; INTRAVENOUS ONCE
Status: COMPLETED | OUTPATIENT
Start: 2021-01-01 | End: 2021-01-01

## 2021-01-01 RX ORDER — FUROSEMIDE 10 MG/ML
60 INJECTION INTRAMUSCULAR; INTRAVENOUS ONCE
Status: COMPLETED | OUTPATIENT
Start: 2021-01-01 | End: 2021-01-01

## 2021-01-01 RX ORDER — HYDROCODONE BITARTRATE AND ACETAMINOPHEN 500; 5 MG/1; MG/1
TABLET ORAL
Status: DISCONTINUED | OUTPATIENT
Start: 2021-01-01 | End: 2021-01-01

## 2021-01-01 RX ORDER — ETOMIDATE 2 MG/ML
INJECTION INTRAVENOUS
Status: COMPLETED
Start: 2021-01-01 | End: 2021-01-01

## 2021-01-01 RX ORDER — FLUCONAZOLE 200 MG/1
400 TABLET ORAL DAILY
Status: DISCONTINUED | OUTPATIENT
Start: 2021-01-01 | End: 2021-01-01

## 2021-01-01 RX ORDER — POLYETHYLENE GLYCOL 3350 17 G/17G
17 POWDER, FOR SOLUTION ORAL DAILY
Status: DISCONTINUED | OUTPATIENT
Start: 2021-01-01 | End: 2021-01-01

## 2021-01-01 RX ORDER — SODIUM BICARBONATE 1 MEQ/ML
SYRINGE (ML) INTRAVENOUS CODE/TRAUMA/SEDATION MEDICATION
Status: COMPLETED | OUTPATIENT
Start: 2021-01-01 | End: 2021-01-01

## 2021-01-01 RX ORDER — HYDROXYUREA 500 MG/1
2000 CAPSULE ORAL 2 TIMES DAILY
Status: DISCONTINUED | OUTPATIENT
Start: 2021-01-01 | End: 2021-01-01

## 2021-01-01 RX ORDER — PHENYLEPHRINE HCL IN 0.9% NACL 1 MG/10 ML
SYRINGE (ML) INTRAVENOUS
Status: COMPLETED
Start: 2021-01-01 | End: 2021-01-01

## 2021-01-01 RX ORDER — LOPERAMIDE HYDROCHLORIDE 2 MG/1
2 CAPSULE ORAL 4 TIMES DAILY PRN
Status: DISCONTINUED | OUTPATIENT
Start: 2021-01-01 | End: 2021-01-01

## 2021-01-01 RX ORDER — FUROSEMIDE 10 MG/ML
40 INJECTION INTRAMUSCULAR; INTRAVENOUS ONCE
Status: COMPLETED | OUTPATIENT
Start: 2021-01-01 | End: 2021-01-01

## 2021-01-01 RX ORDER — LISINOPRIL 5 MG/1
5 TABLET ORAL DAILY
Status: ON HOLD | COMMUNITY
End: 2021-01-01 | Stop reason: ALTCHOICE

## 2021-01-01 RX ORDER — ALBUMIN HUMAN 50 G/1000ML
SOLUTION INTRAVENOUS
Status: COMPLETED
Start: 2021-01-01 | End: 2021-01-01

## 2021-01-01 RX ORDER — ACETAMINOPHEN 325 MG/1
650 TABLET ORAL EVERY 6 HOURS PRN
Status: DISCONTINUED | OUTPATIENT
Start: 2021-01-01 | End: 2021-01-01

## 2021-01-01 RX ORDER — AMLODIPINE BESYLATE 5 MG/1
5 TABLET ORAL
COMMUNITY
Start: 2021-01-01

## 2021-01-01 RX ORDER — FERROUS SULFATE 325(65) MG
325 TABLET, DELAYED RELEASE (ENTERIC COATED) ORAL DAILY
Status: DISCONTINUED | OUTPATIENT
Start: 2021-01-01 | End: 2021-01-01

## 2021-01-01 RX ORDER — LANOLIN ALCOHOL/MO/W.PET/CERES
1000 CREAM (GRAM) TOPICAL DAILY
COMMUNITY
Start: 2020-09-04

## 2021-01-01 RX ORDER — ENOXAPARIN SODIUM 100 MG/ML
40 INJECTION SUBCUTANEOUS EVERY 24 HOURS
Status: DISCONTINUED | OUTPATIENT
Start: 2021-01-01 | End: 2021-01-01

## 2021-01-01 RX ORDER — ERGOCALCIFEROL 1.25 MG/1
50000 CAPSULE ORAL WEEKLY
COMMUNITY

## 2021-01-01 RX ORDER — IPRATROPIUM BROMIDE AND ALBUTEROL SULFATE 2.5; .5 MG/3ML; MG/3ML
3 SOLUTION RESPIRATORY (INHALATION) EVERY 6 HOURS
Status: DISCONTINUED | OUTPATIENT
Start: 2021-01-01 | End: 2021-01-01

## 2021-01-01 RX ORDER — ROCURONIUM BROMIDE 10 MG/ML
INJECTION, SOLUTION INTRAVENOUS
Status: COMPLETED
Start: 2021-01-01 | End: 2021-01-01

## 2021-01-01 RX ORDER — ACETAMINOPHEN 10 MG/ML
1000 INJECTION, SOLUTION INTRAVENOUS EVERY 8 HOURS
Status: DISCONTINUED | OUTPATIENT
Start: 2021-01-01 | End: 2021-01-01

## 2021-01-01 RX ORDER — TORSEMIDE 10 MG/1
10 TABLET ORAL DAILY
Status: ON HOLD | COMMUNITY
Start: 2021-01-01 | End: 2021-01-01 | Stop reason: ALTCHOICE

## 2021-01-01 RX ORDER — SEVELAMER CARBONATE 800 MG/1
800 TABLET, FILM COATED ORAL
Status: DISCONTINUED | OUTPATIENT
Start: 2021-01-01 | End: 2021-01-01

## 2021-01-01 RX ORDER — FUROSEMIDE 10 MG/ML
120 INJECTION INTRAMUSCULAR; INTRAVENOUS DAILY
Status: DISCONTINUED | OUTPATIENT
Start: 2021-01-01 | End: 2021-01-01

## 2021-01-01 RX ORDER — MAGNESIUM SULFATE HEPTAHYDRATE 500 MG/ML
INJECTION, SOLUTION INTRAMUSCULAR; INTRAVENOUS CODE/TRAUMA/SEDATION MEDICATION
Status: COMPLETED | OUTPATIENT
Start: 2021-01-01 | End: 2021-01-01

## 2021-01-01 RX ORDER — LORAZEPAM 0.5 MG/1
1 TABLET ORAL EVERY 12 HOURS PRN
Status: DISCONTINUED | OUTPATIENT
Start: 2021-01-01 | End: 2021-01-01

## 2021-01-01 RX ORDER — ATORVASTATIN CALCIUM 20 MG/1
40 TABLET, FILM COATED ORAL DAILY
Status: DISCONTINUED | OUTPATIENT
Start: 2021-01-01 | End: 2021-01-01

## 2021-01-01 RX ORDER — ALLOPURINOL 300 MG/1
300 TABLET ORAL DAILY
Status: DISCONTINUED | OUTPATIENT
Start: 2021-01-01 | End: 2021-01-01

## 2021-01-01 RX ORDER — FENTANYL CITRATE 50 UG/ML
50 INJECTION, SOLUTION INTRAMUSCULAR; INTRAVENOUS ONCE
Status: COMPLETED | OUTPATIENT
Start: 2021-01-01 | End: 2021-01-01

## 2021-01-01 RX ORDER — FLUTICASONE PROPIONATE 50 MCG
2 SPRAY, SUSPENSION (ML) NASAL DAILY
Status: DISCONTINUED | OUTPATIENT
Start: 2021-01-01 | End: 2021-01-01

## 2021-01-01 RX ORDER — HYDROXYUREA 500 MG/1
1500 CAPSULE ORAL 2 TIMES DAILY
Status: DISCONTINUED | OUTPATIENT
Start: 2021-01-01 | End: 2021-01-01

## 2021-01-01 RX ORDER — LEVOFLOXACIN 5 MG/ML
500 INJECTION, SOLUTION INTRAVENOUS
Status: DISCONTINUED | OUTPATIENT
Start: 2021-09-10 | End: 2021-01-01

## 2021-01-01 RX ORDER — HEPARIN SODIUM 5000 [USP'U]/ML
7500 INJECTION, SOLUTION INTRAVENOUS; SUBCUTANEOUS EVERY 8 HOURS
Status: DISCONTINUED | OUTPATIENT
Start: 2021-01-01 | End: 2021-01-01

## 2021-01-01 RX ORDER — DIPHENHYDRAMINE HCL 25 MG
25 CAPSULE ORAL ONCE AS NEEDED
Status: COMPLETED | OUTPATIENT
Start: 2021-01-01 | End: 2021-01-01

## 2021-01-01 RX ORDER — GUAIFENESIN 600 MG/1
600 TABLET, EXTENDED RELEASE ORAL 2 TIMES DAILY
Status: DISCONTINUED | OUTPATIENT
Start: 2021-01-01 | End: 2021-01-01

## 2021-01-01 RX ORDER — LORAZEPAM 0.5 MG/1
0.5 TABLET ORAL EVERY 12 HOURS PRN
Status: DISCONTINUED | OUTPATIENT
Start: 2021-01-01 | End: 2021-01-01

## 2021-01-01 RX ORDER — OXYCODONE HYDROCHLORIDE 5 MG/1
5 TABLET ORAL EVERY 6 HOURS PRN
Status: DISCONTINUED | OUTPATIENT
Start: 2021-01-01 | End: 2021-01-01

## 2021-01-01 RX ORDER — HYDROMORPHONE HYDROCHLORIDE 1 MG/ML
0.2 INJECTION, SOLUTION INTRAMUSCULAR; INTRAVENOUS; SUBCUTANEOUS ONCE
Status: COMPLETED | OUTPATIENT
Start: 2021-01-01 | End: 2021-01-01

## 2021-01-01 RX ORDER — FUROSEMIDE 40 MG/1
40 TABLET ORAL 2 TIMES DAILY
Status: DISCONTINUED | OUTPATIENT
Start: 2021-01-01 | End: 2021-01-01

## 2021-01-01 RX ORDER — TRAMADOL HYDROCHLORIDE 50 MG/1
50 TABLET ORAL EVERY 6 HOURS PRN
Status: DISCONTINUED | OUTPATIENT
Start: 2021-01-01 | End: 2021-01-01

## 2021-01-01 RX ORDER — BUMETANIDE 1 MG/1
2 TABLET ORAL DAILY
Status: DISCONTINUED | OUTPATIENT
Start: 2021-01-01 | End: 2021-01-01

## 2021-01-01 RX ORDER — SUCCINYLCHOLINE CHLORIDE 20 MG/ML
INJECTION INTRAMUSCULAR; INTRAVENOUS
Status: COMPLETED
Start: 2021-01-01 | End: 2021-01-01

## 2021-01-01 RX ORDER — ALLOPURINOL 100 MG/1
100 TABLET ORAL DAILY
COMMUNITY
Start: 2021-01-01

## 2021-01-01 RX ORDER — FENTANYL CITRATE 50 UG/ML
50 INJECTION, SOLUTION INTRAMUSCULAR; INTRAVENOUS
Status: DISCONTINUED | OUTPATIENT
Start: 2021-01-01 | End: 2021-01-01 | Stop reason: HOSPADM

## 2021-01-01 RX ORDER — FUROSEMIDE 10 MG/ML
80 INJECTION INTRAMUSCULAR; INTRAVENOUS 2 TIMES DAILY
Status: DISCONTINUED | OUTPATIENT
Start: 2021-01-01 | End: 2021-01-01

## 2021-01-01 RX ORDER — FUROSEMIDE 10 MG/ML
80 INJECTION INTRAMUSCULAR; INTRAVENOUS 3 TIMES DAILY
Status: DISCONTINUED | OUTPATIENT
Start: 2021-01-01 | End: 2021-01-01

## 2021-01-01 RX ORDER — FUROSEMIDE 80 MG/1
80 TABLET ORAL 2 TIMES DAILY
Status: DISCONTINUED | OUTPATIENT
Start: 2021-01-01 | End: 2021-01-01

## 2021-01-01 RX ORDER — PROPOFOL 10 MG/ML
VIAL (ML) INTRAVENOUS
Status: COMPLETED
Start: 2021-01-01 | End: 2021-01-01

## 2021-01-01 RX ORDER — SODIUM CHLORIDE 0.9 % (FLUSH) 0.9 %
10 SYRINGE (ML) INJECTION
Status: DISCONTINUED | OUTPATIENT
Start: 2021-01-01 | End: 2021-01-01 | Stop reason: HOSPADM

## 2021-01-01 RX ORDER — HEPARIN 100 UNIT/ML
300 SYRINGE INTRAVENOUS
Status: DISCONTINUED | OUTPATIENT
Start: 2021-01-01 | End: 2021-01-01 | Stop reason: HOSPADM

## 2021-01-01 RX ORDER — LANOLIN ALCOHOL/MO/W.PET/CERES
400 CREAM (GRAM) TOPICAL 2 TIMES DAILY
Status: DISCONTINUED | OUTPATIENT
Start: 2021-01-01 | End: 2021-01-01

## 2021-01-01 RX ORDER — FUROSEMIDE 10 MG/ML
80 INJECTION INTRAMUSCULAR; INTRAVENOUS ONCE
Status: COMPLETED | OUTPATIENT
Start: 2021-01-01 | End: 2021-01-01

## 2021-01-01 RX ORDER — MICONAZOLE NITRATE 2 %
POWDER (GRAM) TOPICAL 2 TIMES DAILY
Status: DISCONTINUED | OUTPATIENT
Start: 2021-01-01 | End: 2021-01-01

## 2021-01-01 RX ORDER — CALCIUM CHLORIDE INJECTION 100 MG/ML
INJECTION, SOLUTION INTRAVENOUS CODE/TRAUMA/SEDATION MEDICATION
Status: COMPLETED | OUTPATIENT
Start: 2021-01-01 | End: 2021-01-01

## 2021-01-01 RX ORDER — PROPOFOL 10 MG/ML
INJECTION, EMULSION INTRAVENOUS
Status: COMPLETED
Start: 2021-01-01 | End: 2021-01-01

## 2021-01-01 RX ORDER — BUMETANIDE 1 MG/1
1 TABLET ORAL DAILY
Status: DISCONTINUED | OUTPATIENT
Start: 2021-01-01 | End: 2021-01-01

## 2021-01-01 RX ORDER — FERROUS SULFATE 325(65) MG
325 TABLET ORAL EVERY OTHER DAY
COMMUNITY
Start: 2020-09-04

## 2021-01-01 RX ORDER — POTASSIUM CHLORIDE 29.8 MG/ML
40 INJECTION INTRAVENOUS ONCE
Status: COMPLETED | OUTPATIENT
Start: 2021-01-01 | End: 2021-01-01

## 2021-01-01 RX ORDER — ALBUMIN HUMAN 50 G/1000ML
25 SOLUTION INTRAVENOUS ONCE
Status: DISCONTINUED | OUTPATIENT
Start: 2021-01-01 | End: 2021-01-01

## 2021-01-01 RX ORDER — HYDROXYUREA 500 MG/1
1000 CAPSULE ORAL 2 TIMES DAILY
Status: DISCONTINUED | OUTPATIENT
Start: 2021-01-01 | End: 2021-01-01

## 2021-01-01 RX ORDER — FUROSEMIDE 10 MG/ML
INJECTION INTRAMUSCULAR; INTRAVENOUS
Status: DISPENSED
Start: 2021-01-01 | End: 2021-01-01

## 2021-01-01 RX ORDER — BENZONATATE 100 MG/1
100 CAPSULE ORAL 3 TIMES DAILY PRN
Status: DISCONTINUED | OUTPATIENT
Start: 2021-01-01 | End: 2021-01-01

## 2021-01-01 RX ORDER — EPINEPHRINE 0.1 MG/ML
INJECTION INTRAVENOUS CODE/TRAUMA/SEDATION MEDICATION
Status: COMPLETED | OUTPATIENT
Start: 2021-01-01 | End: 2021-01-01

## 2021-01-01 RX ORDER — TAMSULOSIN HYDROCHLORIDE 0.4 MG/1
0.4 CAPSULE ORAL DAILY
COMMUNITY
Start: 2021-01-01

## 2021-01-01 RX ORDER — FUROSEMIDE 10 MG/ML
100 INJECTION INTRAMUSCULAR; INTRAVENOUS 3 TIMES DAILY
Status: DISCONTINUED | OUTPATIENT
Start: 2021-01-01 | End: 2021-01-01

## 2021-01-01 RX ORDER — MUPIROCIN 20 MG/G
OINTMENT TOPICAL 2 TIMES DAILY
Status: COMPLETED | OUTPATIENT
Start: 2021-01-01 | End: 2021-01-01

## 2021-01-01 RX ORDER — IPRATROPIUM BROMIDE AND ALBUTEROL SULFATE 2.5; .5 MG/3ML; MG/3ML
3 SOLUTION RESPIRATORY (INHALATION) EVERY 4 HOURS PRN
Status: DISCONTINUED | OUTPATIENT
Start: 2021-01-01 | End: 2021-01-01

## 2021-01-01 RX ORDER — FAMOTIDINE 10 MG/ML
20 INJECTION INTRAVENOUS 2 TIMES DAILY
Status: DISCONTINUED | OUTPATIENT
Start: 2021-01-01 | End: 2021-01-01

## 2021-01-01 RX ORDER — FUROSEMIDE 40 MG/1
40 TABLET ORAL 2 TIMES DAILY
Status: ON HOLD | COMMUNITY
Start: 2021-01-01 | End: 2021-01-01 | Stop reason: ALTCHOICE

## 2021-01-01 RX ORDER — PROCHLORPERAZINE EDISYLATE 5 MG/ML
10 INJECTION INTRAMUSCULAR; INTRAVENOUS EVERY 6 HOURS PRN
Status: DISCONTINUED | OUTPATIENT
Start: 2021-01-01 | End: 2021-01-01 | Stop reason: HOSPADM

## 2021-01-01 RX ORDER — OXYMETAZOLINE HCL 0.05 %
2 SPRAY, NON-AEROSOL (ML) NASAL ONCE
Status: COMPLETED | OUTPATIENT
Start: 2021-01-01 | End: 2021-01-01

## 2021-01-01 RX ORDER — POTASSIUM CHLORIDE 20 MEQ/1
40 TABLET, EXTENDED RELEASE ORAL 3 TIMES DAILY
Status: COMPLETED | OUTPATIENT
Start: 2021-01-01 | End: 2021-01-01

## 2021-01-01 RX ORDER — LORAZEPAM 2 MG/ML
INJECTION INTRAMUSCULAR
Status: COMPLETED
Start: 2021-01-01 | End: 2021-01-01

## 2021-01-01 RX ORDER — LEVOFLOXACIN 5 MG/ML
500 INJECTION, SOLUTION INTRAVENOUS
Status: DISCONTINUED | OUTPATIENT
Start: 2021-01-01 | End: 2021-01-01

## 2021-01-01 RX ORDER — OXYMETAZOLINE HCL 0.05 %
2 SPRAY, NON-AEROSOL (ML) NASAL 2 TIMES DAILY
Status: DISPENSED | OUTPATIENT
Start: 2021-01-01 | End: 2021-01-01

## 2021-01-01 RX ORDER — HYDROMORPHONE HYDROCHLORIDE 1 MG/ML
1 INJECTION, SOLUTION INTRAMUSCULAR; INTRAVENOUS; SUBCUTANEOUS
Status: DISCONTINUED | OUTPATIENT
Start: 2021-01-01 | End: 2021-01-01

## 2021-01-01 RX ORDER — BUMETANIDE 1 MG/1
1 TABLET ORAL ONCE
Status: COMPLETED | OUTPATIENT
Start: 2021-01-01 | End: 2021-01-01

## 2021-01-01 RX ORDER — AMOXICILLIN 250 MG
1 CAPSULE ORAL 2 TIMES DAILY
Status: DISCONTINUED | OUTPATIENT
Start: 2021-01-01 | End: 2021-01-01

## 2021-01-01 RX ORDER — FUROSEMIDE 10 MG/ML
20 INJECTION INTRAMUSCULAR; INTRAVENOUS ONCE
Status: COMPLETED | OUTPATIENT
Start: 2021-01-01 | End: 2021-01-01

## 2021-01-01 RX ORDER — METOPROLOL TARTRATE 100 MG/1
100 TABLET ORAL 2 TIMES DAILY
COMMUNITY
Start: 2021-01-01

## 2021-01-01 RX ORDER — BUMETANIDE 1 MG/1
1 TABLET ORAL 2 TIMES DAILY
COMMUNITY
Start: 2021-01-01

## 2021-01-01 RX ORDER — ALBUMIN HUMAN 50 G/1000ML
25 SOLUTION INTRAVENOUS ONCE
Status: COMPLETED | OUTPATIENT
Start: 2021-01-01 | End: 2021-01-01

## 2021-01-01 RX ORDER — DOXYCYCLINE HYCLATE 100 MG
100 TABLET ORAL 2 TIMES DAILY
COMMUNITY
Start: 2021-01-01

## 2021-01-01 RX ORDER — LORAZEPAM 2 MG/ML
1 INJECTION INTRAMUSCULAR
Status: DISCONTINUED | OUTPATIENT
Start: 2021-01-01 | End: 2021-01-01 | Stop reason: HOSPADM

## 2021-01-01 RX ORDER — NOREPINEPHRINE BITARTRATE/D5W 4MG/250ML
PLASTIC BAG, INJECTION (ML) INTRAVENOUS
Status: COMPLETED
Start: 2021-01-01 | End: 2021-01-01

## 2021-01-01 RX ORDER — LORAZEPAM 0.5 MG/1
0.5 TABLET ORAL ONCE
Status: COMPLETED | OUTPATIENT
Start: 2021-01-01 | End: 2021-01-01

## 2021-01-01 RX ORDER — FENTANYL CITRATE-0.9 % NACL/PF 10 MCG/ML
0-250 PLASTIC BAG, INJECTION (ML) INTRAVENOUS CONTINUOUS
Status: DISCONTINUED | OUTPATIENT
Start: 2021-01-01 | End: 2021-01-01 | Stop reason: HOSPADM

## 2021-01-01 RX ORDER — POTASSIUM CHLORIDE 29.8 MG/ML
40 INJECTION INTRAVENOUS EVERY 4 HOURS
Status: DISCONTINUED | OUTPATIENT
Start: 2021-01-01 | End: 2021-01-01

## 2021-01-01 RX ORDER — ALLOPURINOL 100 MG/1
100 TABLET ORAL DAILY
Status: DISCONTINUED | OUTPATIENT
Start: 2021-01-01 | End: 2021-01-01

## 2021-01-01 RX ORDER — ALLOPURINOL 300 MG/1
300 TABLET ORAL 2 TIMES DAILY
Status: DISCONTINUED | OUTPATIENT
Start: 2021-01-01 | End: 2021-01-01

## 2021-01-01 RX ORDER — FAMOTIDINE 20 MG/1
20 TABLET, FILM COATED ORAL 2 TIMES DAILY
Status: DISCONTINUED | OUTPATIENT
Start: 2021-01-01 | End: 2021-01-01

## 2021-01-01 RX ORDER — LEVOFLOXACIN 500 MG/1
500 TABLET, FILM COATED ORAL DAILY
Status: DISCONTINUED | OUTPATIENT
Start: 2021-01-01 | End: 2021-01-01

## 2021-01-01 RX ORDER — NAPROXEN SODIUM 220 MG/1
81 TABLET, FILM COATED ORAL DAILY
Status: DISCONTINUED | OUTPATIENT
Start: 2021-01-01 | End: 2021-01-01

## 2021-01-01 RX ORDER — PROPOFOL 10 MG/ML
0-50 INJECTION, EMULSION INTRAVENOUS CONTINUOUS
Status: DISCONTINUED | OUTPATIENT
Start: 2021-01-01 | End: 2021-01-01 | Stop reason: HOSPADM

## 2021-01-01 RX ORDER — SODIUM CHLORIDE 9 MG/ML
INJECTION, SOLUTION INTRAVENOUS
Status: DISCONTINUED | OUTPATIENT
Start: 2021-01-01 | End: 2021-01-01

## 2021-01-01 RX ORDER — DEXAMETHASONE SODIUM PHOSPHATE 1 MG/ML
1 SOLUTION/ DROPS OPHTHALMIC
Status: DISPENSED | OUTPATIENT
Start: 2021-01-01 | End: 2021-01-01

## 2021-01-01 RX ORDER — METOLAZONE 2.5 MG/1
5 TABLET ORAL DAILY
Status: DISCONTINUED | OUTPATIENT
Start: 2021-01-01 | End: 2021-01-01

## 2021-01-01 RX ORDER — ACYCLOVIR 200 MG/1
400 CAPSULE ORAL 2 TIMES DAILY
Status: DISCONTINUED | OUTPATIENT
Start: 2021-01-01 | End: 2021-01-01

## 2021-01-01 RX ORDER — HEPARIN SODIUM 5000 [USP'U]/ML
5000 INJECTION, SOLUTION INTRAVENOUS; SUBCUTANEOUS EVERY 8 HOURS
Status: DISCONTINUED | OUTPATIENT
Start: 2021-01-01 | End: 2021-01-01

## 2021-01-01 RX ORDER — FLUCONAZOLE 2 MG/ML
400 INJECTION, SOLUTION INTRAVENOUS
Status: DISCONTINUED | OUTPATIENT
Start: 2021-01-01 | End: 2021-01-01

## 2021-01-01 RX ORDER — SODIUM CHLORIDE FOR INHALATION 3 %
4 VIAL, NEBULIZER (ML) INHALATION ONCE
Status: COMPLETED | OUTPATIENT
Start: 2021-01-01 | End: 2021-01-01

## 2021-01-01 RX ORDER — CALCIUM CARBONATE 600 MG
600 TABLET ORAL DAILY
COMMUNITY
Start: 2021-01-01

## 2021-01-01 RX ORDER — OXYMETAZOLINE HCL 0.05 %
2 SPRAY, NON-AEROSOL (ML) NASAL 2 TIMES DAILY
Status: DISCONTINUED | OUTPATIENT
Start: 2021-01-01 | End: 2021-01-01

## 2021-01-01 RX ORDER — FENTANYL CITRATE 50 UG/ML
INJECTION, SOLUTION INTRAMUSCULAR; INTRAVENOUS
Status: COMPLETED
Start: 2021-01-01 | End: 2021-01-01

## 2021-01-01 RX ORDER — AMLODIPINE BESYLATE 5 MG/1
5 TABLET ORAL DAILY
Status: DISCONTINUED | OUTPATIENT
Start: 2021-01-01 | End: 2021-01-01

## 2021-01-01 RX ORDER — NOREPINEPHRINE BITARTRATE/D5W 4MG/250ML
0-3 PLASTIC BAG, INJECTION (ML) INTRAVENOUS CONTINUOUS
Status: DISCONTINUED | OUTPATIENT
Start: 2021-01-01 | End: 2021-01-01

## 2021-01-01 RX ORDER — NOREPINEPHRINE BITARTRATE/D5W 4MG/250ML
0-3 PLASTIC BAG, INJECTION (ML) INTRAVENOUS CONTINUOUS
Status: DISCONTINUED | OUTPATIENT
Start: 2021-01-01 | End: 2021-01-01 | Stop reason: HOSPADM

## 2021-01-01 RX ORDER — FUROSEMIDE 10 MG/ML
60 INJECTION INTRAMUSCULAR; INTRAVENOUS 2 TIMES DAILY
Status: DISCONTINUED | OUTPATIENT
Start: 2021-01-01 | End: 2021-01-01

## 2021-01-01 RX ORDER — FUROSEMIDE 10 MG/ML
40 INJECTION INTRAMUSCULAR; INTRAVENOUS
Status: DISCONTINUED | OUTPATIENT
Start: 2021-01-01 | End: 2021-01-01

## 2021-01-01 RX ORDER — FUROSEMIDE 40 MG/1
40 TABLET ORAL DAILY
Status: DISCONTINUED | OUTPATIENT
Start: 2021-01-01 | End: 2021-01-01

## 2021-01-01 RX ORDER — DEXAMETHASONE SODIUM PHOSPHATE 100 MG/10ML
8 INJECTION INTRAMUSCULAR; INTRAVENOUS ONCE
Status: COMPLETED | OUTPATIENT
Start: 2021-01-01 | End: 2021-01-01

## 2021-01-01 RX ORDER — FUROSEMIDE 10 MG/ML
40 INJECTION INTRAMUSCULAR; INTRAVENOUS EVERY 8 HOURS
Status: COMPLETED | OUTPATIENT
Start: 2021-01-01 | End: 2021-01-01

## 2021-01-01 RX ORDER — LIDOCAINE HYDROCHLORIDE 20 MG/ML
10 INJECTION, SOLUTION INFILTRATION; PERINEURAL ONCE
Status: COMPLETED | OUTPATIENT
Start: 2021-01-01 | End: 2021-01-01

## 2021-01-01 RX ORDER — ASPIRIN 325 MG
325 TABLET, DELAYED RELEASE (ENTERIC COATED) ORAL
COMMUNITY
Start: 2021-01-01

## 2021-01-01 RX ORDER — FUROSEMIDE 10 MG/ML
120 INJECTION INTRAMUSCULAR; INTRAVENOUS 3 TIMES DAILY
Status: DISCONTINUED | OUTPATIENT
Start: 2021-01-01 | End: 2021-01-01

## 2021-01-01 RX ORDER — DILTIAZEM HYDROCHLORIDE 180 MG/1
180 CAPSULE, EXTENDED RELEASE ORAL DAILY
Status: ON HOLD | COMMUNITY
Start: 2021-01-01 | End: 2021-01-01 | Stop reason: ALTCHOICE

## 2021-01-01 RX ORDER — TAMSULOSIN HYDROCHLORIDE 0.4 MG/1
0.4 CAPSULE ORAL DAILY
Status: DISCONTINUED | OUTPATIENT
Start: 2021-01-01 | End: 2021-01-01

## 2021-01-01 RX ADMIN — DOCUSATE SODIUM 50 MG AND SENNOSIDES 8.6 MG 1 TABLET: 8.6; 5 TABLET, FILM COATED ORAL at 08:08

## 2021-01-01 RX ADMIN — Medication 0.14 MCG/KG/MIN: at 10:09

## 2021-01-01 RX ADMIN — LEVOFLOXACIN 500 MG: 500 TABLET, FILM COATED ORAL at 09:09

## 2021-01-01 RX ADMIN — LORAZEPAM 1 MG: 1 TABLET ORAL at 08:09

## 2021-01-01 RX ADMIN — IPRATROPIUM BROMIDE AND ALBUTEROL SULFATE 3 ML: .5; 2.5 SOLUTION RESPIRATORY (INHALATION) at 01:09

## 2021-01-01 RX ADMIN — HYDROXYUREA 1500 MG: 500 CAPSULE ORAL at 09:08

## 2021-01-01 RX ADMIN — HYDROXYUREA 1000 MG: 500 CAPSULE ORAL at 09:09

## 2021-01-01 RX ADMIN — POTASSIUM CHLORIDE 20 MEQ: 200 INJECTION, SOLUTION INTRAVENOUS at 05:09

## 2021-01-01 RX ADMIN — SEVELAMER CARBONATE 1600 MG: 800 TABLET, FILM COATED ORAL at 05:08

## 2021-01-01 RX ADMIN — IPRATROPIUM BROMIDE AND ALBUTEROL SULFATE 3 ML: .5; 2.5 SOLUTION RESPIRATORY (INHALATION) at 09:08

## 2021-01-01 RX ADMIN — PROPOFOL 50 MCG/KG/MIN: 10 INJECTION, EMULSION INTRAVENOUS at 02:09

## 2021-01-01 RX ADMIN — TAMSULOSIN HYDROCHLORIDE 0.4 MG: 0.4 CAPSULE ORAL at 10:08

## 2021-01-01 RX ADMIN — Medication 0.04 MCG/KG/MIN: at 01:09

## 2021-01-01 RX ADMIN — POTASSIUM CHLORIDE 40 MEQ: 1500 TABLET, EXTENDED RELEASE ORAL at 09:08

## 2021-01-01 RX ADMIN — IPRATROPIUM BROMIDE AND ALBUTEROL SULFATE 3 ML: .5; 2.5 SOLUTION RESPIRATORY (INHALATION) at 01:08

## 2021-01-01 RX ADMIN — FUROSEMIDE 40 MG: 80 TABLET ORAL at 08:08

## 2021-01-01 RX ADMIN — FAMOTIDINE 20 MG: 20 TABLET ORAL at 09:08

## 2021-01-01 RX ADMIN — SEVELAMER CARBONATE 1600 MG: 800 TABLET, FILM COATED ORAL at 12:08

## 2021-01-01 RX ADMIN — BENZONATATE 100 MG: 100 CAPSULE, LIQUID FILLED ORAL at 08:09

## 2021-01-01 RX ADMIN — FUROSEMIDE 100 MG: 10 INJECTION, SOLUTION INTRAVENOUS at 08:09

## 2021-01-01 RX ADMIN — DOCUSATE SODIUM 50 MG AND SENNOSIDES 8.6 MG 1 TABLET: 8.6; 5 TABLET, FILM COATED ORAL at 10:08

## 2021-01-01 RX ADMIN — GUAIFENESIN 600 MG: 600 TABLET, EXTENDED RELEASE ORAL at 08:09

## 2021-01-01 RX ADMIN — HYDROXYUREA 2000 MG: 200 CAPSULE ORAL at 08:09

## 2021-01-01 RX ADMIN — FAMOTIDINE 20 MG: 10 INJECTION INTRAVENOUS at 08:09

## 2021-01-01 RX ADMIN — MUPIROCIN: 20 OINTMENT TOPICAL at 10:08

## 2021-01-01 RX ADMIN — TAMSULOSIN HYDROCHLORIDE 0.4 MG: 0.4 CAPSULE ORAL at 07:08

## 2021-01-01 RX ADMIN — PROPOFOL 35 MCG/KG/MIN: 10 INJECTION, EMULSION INTRAVENOUS at 04:09

## 2021-01-01 RX ADMIN — ENOXAPARIN SODIUM 40 MG: 40 INJECTION SUBCUTANEOUS at 05:08

## 2021-01-01 RX ADMIN — ACYCLOVIR SODIUM 200 MG: 1000 INJECTION, SOLUTION INTRAVENOUS at 08:09

## 2021-01-01 RX ADMIN — ALLOPURINOL 300 MG: 100 TABLET ORAL at 10:08

## 2021-01-01 RX ADMIN — Medication 2 SPRAY: at 12:08

## 2021-01-01 RX ADMIN — FUROSEMIDE 80 MG: 80 TABLET ORAL at 05:08

## 2021-01-01 RX ADMIN — PHYTONADIONE 10 MG: 10 INJECTION, EMULSION INTRAMUSCULAR; INTRAVENOUS; SUBCUTANEOUS at 09:09

## 2021-01-01 RX ADMIN — METOPROLOL TARTRATE 100 MG: 50 TABLET, FILM COATED ORAL at 08:08

## 2021-01-01 RX ADMIN — GUAIFENESIN AND DEXTROMETHORPHAN HYDROBROMIDE 1 TABLET: 600; 30 TABLET, EXTENDED RELEASE ORAL at 12:08

## 2021-01-01 RX ADMIN — FUROSEMIDE 80 MG: 10 INJECTION, SOLUTION INTRAMUSCULAR; INTRAVENOUS at 08:08

## 2021-01-01 RX ADMIN — PROPOFOL 50 MCG/KG/MIN: 10 INJECTION, EMULSION INTRAVENOUS at 09:09

## 2021-01-01 RX ADMIN — METOPROLOL TARTRATE 100 MG: 50 TABLET, FILM COATED ORAL at 09:08

## 2021-01-01 RX ADMIN — IPRATROPIUM BROMIDE AND ALBUTEROL SULFATE 3 ML: .5; 2.5 SOLUTION RESPIRATORY (INHALATION) at 03:08

## 2021-01-01 RX ADMIN — DEXAMETHASONE 1 DROP: 1 SUSPENSION OPHTHALMIC at 08:08

## 2021-01-01 RX ADMIN — ACYCLOVIR 400 MG: 200 CAPSULE ORAL at 10:09

## 2021-01-01 RX ADMIN — LORAZEPAM 1 MG: 1 TABLET ORAL at 10:09

## 2021-01-01 RX ADMIN — ENOXAPARIN SODIUM 40 MG: 40 INJECTION SUBCUTANEOUS at 09:08

## 2021-01-01 RX ADMIN — FERROUS SULFATE TAB EC 325 MG (65 MG FE EQUIVALENT) 325 MG: 325 (65 FE) TABLET DELAYED RESPONSE at 07:08

## 2021-01-01 RX ADMIN — FLUTICASONE PROPIONATE 100 MCG: 50 SPRAY, METERED NASAL at 09:08

## 2021-01-01 RX ADMIN — Medication 400 MG: at 03:08

## 2021-01-01 RX ADMIN — HEPARIN SODIUM 5000 UNITS: 5000 INJECTION INTRAVENOUS; SUBCUTANEOUS at 08:08

## 2021-01-01 RX ADMIN — FLUCONAZOLE 400 MG: 2 INJECTION, SOLUTION INTRAVENOUS at 10:09

## 2021-01-01 RX ADMIN — LEVOFLOXACIN 500 MG: 500 TABLET, FILM COATED ORAL at 08:09

## 2021-01-01 RX ADMIN — AZITHROMYCIN MONOHYDRATE 250 MG: 500 INJECTION, POWDER, LYOPHILIZED, FOR SOLUTION INTRAVENOUS at 10:08

## 2021-01-01 RX ADMIN — HYDROXYUREA 1000 MG: 500 CAPSULE ORAL at 10:08

## 2021-01-01 RX ADMIN — Medication 400 MG: at 10:09

## 2021-01-01 RX ADMIN — METOPROLOL TARTRATE 100 MG: 50 TABLET, FILM COATED ORAL at 08:09

## 2021-01-01 RX ADMIN — SEVELAMER CARBONATE 1600 MG: 800 TABLET, FILM COATED ORAL at 01:08

## 2021-01-01 RX ADMIN — IPRATROPIUM BROMIDE AND ALBUTEROL SULFATE 3 ML: .5; 2.5 SOLUTION RESPIRATORY (INHALATION) at 12:08

## 2021-01-01 RX ADMIN — PIPERACILLIN SODIUM AND TAZOBACTAM SODIUM 4.5 G: 4; .5 INJECTION, POWDER, FOR SOLUTION INTRAVENOUS at 08:09

## 2021-01-01 RX ADMIN — PIPERACILLIN SODIUM AND TAZOBACTAM SODIUM 4.5 G: 4; .5 INJECTION, POWDER, FOR SOLUTION INTRAVENOUS at 04:09

## 2021-01-01 RX ADMIN — MUPIROCIN: 20 OINTMENT TOPICAL at 09:08

## 2021-01-01 RX ADMIN — ALLOPURINOL 300 MG: 300 TABLET ORAL at 11:09

## 2021-01-01 RX ADMIN — MICONAZOLE NITRATE: 20 POWDER TOPICAL at 09:08

## 2021-01-01 RX ADMIN — IPRATROPIUM BROMIDE AND ALBUTEROL SULFATE 3 ML: .5; 2.5 SOLUTION RESPIRATORY (INHALATION) at 08:08

## 2021-01-01 RX ADMIN — IPRATROPIUM BROMIDE AND ALBUTEROL SULFATE 3 ML: .5; 2.5 SOLUTION RESPIRATORY (INHALATION) at 07:09

## 2021-01-01 RX ADMIN — Medication 0.06 MCG/KG/MIN: at 11:09

## 2021-01-01 RX ADMIN — Medication 1 TABLET: at 09:08

## 2021-01-01 RX ADMIN — ACYCLOVIR 400 MG: 200 CAPSULE ORAL at 08:09

## 2021-01-01 RX ADMIN — GUAIFENESIN AND DEXTROMETHORPHAN HYDROBROMIDE 1 TABLET: 600; 30 TABLET, EXTENDED RELEASE ORAL at 08:08

## 2021-01-01 RX ADMIN — METOLAZONE 5 MG: 5 TABLET ORAL at 08:09

## 2021-01-01 RX ADMIN — PROPOFOL 50 MCG/KG/MIN: 10 INJECTION, EMULSION INTRAVENOUS at 08:09

## 2021-01-01 RX ADMIN — Medication 0.1 MCG/KG/MIN: at 11:09

## 2021-01-01 RX ADMIN — MUPIROCIN: 20 OINTMENT TOPICAL at 08:08

## 2021-01-01 RX ADMIN — HYDROXYUREA 2000 MG: 500 CAPSULE ORAL at 08:08

## 2021-01-01 RX ADMIN — FUROSEMIDE 60 MG: 10 INJECTION, SOLUTION INTRAVENOUS at 05:08

## 2021-01-01 RX ADMIN — METOPROLOL TARTRATE 100 MG: 50 TABLET, FILM COATED ORAL at 10:08

## 2021-01-01 RX ADMIN — Medication 1 TABLET: at 03:08

## 2021-01-01 RX ADMIN — FAMOTIDINE 20 MG: 10 INJECTION INTRAVENOUS at 10:09

## 2021-01-01 RX ADMIN — FERROUS SULFATE TAB EC 325 MG (65 MG FE EQUIVALENT) 325 MG: 325 (65 FE) TABLET DELAYED RESPONSE at 09:08

## 2021-01-01 RX ADMIN — FAMOTIDINE 20 MG: 20 TABLET ORAL at 08:08

## 2021-01-01 RX ADMIN — ALLOPURINOL 300 MG: 100 TABLET ORAL at 08:08

## 2021-01-01 RX ADMIN — AMLODIPINE BESYLATE 5 MG: 5 TABLET ORAL at 10:09

## 2021-01-01 RX ADMIN — PROPOFOL 50 MCG/KG/MIN: 10 INJECTION, EMULSION INTRAVENOUS at 11:09

## 2021-01-01 RX ADMIN — SODIUM CHLORIDE 1000 ML: 0.9 INJECTION, SOLUTION INTRAVENOUS at 04:09

## 2021-01-01 RX ADMIN — FLUCONAZOLE 400 MG: 200 TABLET ORAL at 09:09

## 2021-01-01 RX ADMIN — AMLODIPINE BESYLATE 5 MG: 5 TABLET ORAL at 08:08

## 2021-01-01 RX ADMIN — OXYCODONE 5 MG: 5 TABLET ORAL at 08:09

## 2021-01-01 RX ADMIN — DEXAMETHASONE 6 MG: 4 TABLET ORAL at 09:08

## 2021-01-01 RX ADMIN — ACETAMINOPHEN 650 MG: 325 TABLET ORAL at 06:08

## 2021-01-01 RX ADMIN — FENTANYL CITRATE 50 MCG: 50 INJECTION INTRAMUSCULAR; INTRAVENOUS at 05:09

## 2021-01-01 RX ADMIN — SODIUM CHLORIDE: 0.9 INJECTION, SOLUTION INTRAVENOUS at 09:08

## 2021-01-01 RX ADMIN — Medication 2 SPRAY: at 09:09

## 2021-01-01 RX ADMIN — SEVELAMER CARBONATE 1600 MG: 800 TABLET, FILM COATED ORAL at 08:08

## 2021-01-01 RX ADMIN — TAMSULOSIN HYDROCHLORIDE 0.4 MG: 0.4 CAPSULE ORAL at 08:08

## 2021-01-01 RX ADMIN — PROPOFOL 30 MCG/KG/MIN: 10 INJECTION, EMULSION INTRAVENOUS at 09:09

## 2021-01-01 RX ADMIN — SODIUM CHLORIDE 6 MG: 9 INJECTION, SOLUTION INTRAVENOUS at 05:08

## 2021-01-01 RX ADMIN — METOLAZONE 5 MG: 5 TABLET ORAL at 12:09

## 2021-01-01 RX ADMIN — FUROSEMIDE 40 MG: 80 TABLET ORAL at 11:08

## 2021-01-01 RX ADMIN — FUROSEMIDE 40 MG: 10 INJECTION, SOLUTION INTRAMUSCULAR; INTRAVENOUS at 11:08

## 2021-01-01 RX ADMIN — GUAIFENESIN AND DEXTROMETHORPHAN HYDROBROMIDE 1 TABLET: 600; 30 TABLET, EXTENDED RELEASE ORAL at 03:08

## 2021-01-01 RX ADMIN — MICONAZOLE NITRATE: 20 POWDER TOPICAL at 08:09

## 2021-01-01 RX ADMIN — LORAZEPAM 1 MG: 1 TABLET ORAL at 08:08

## 2021-01-01 RX ADMIN — ACYCLOVIR SODIUM 200 MG: 1000 INJECTION, SOLUTION INTRAVENOUS at 09:09

## 2021-01-01 RX ADMIN — MAGNESIUM SULFATE 2 G: 2 INJECTION INTRAVENOUS at 05:09

## 2021-01-01 RX ADMIN — IPRATROPIUM BROMIDE AND ALBUTEROL SULFATE 3 ML: .5; 2.5 SOLUTION RESPIRATORY (INHALATION) at 07:08

## 2021-01-01 RX ADMIN — BUMETANIDE 1 MG: 1 TABLET ORAL at 11:08

## 2021-01-01 RX ADMIN — SEVELAMER CARBONATE 800 MG: 800 TABLET, FILM COATED ORAL at 05:09

## 2021-01-01 RX ADMIN — PROPOFOL 50 MCG/KG/MIN: 10 INJECTION, EMULSION INTRAVENOUS at 12:09

## 2021-01-01 RX ADMIN — MICONAZOLE NITRATE: 20 POWDER TOPICAL at 08:08

## 2021-01-01 RX ADMIN — PROPOFOL 45 MCG/KG/MIN: 10 INJECTION, EMULSION INTRAVENOUS at 07:09

## 2021-01-01 RX ADMIN — CYTARABINE 7860 MG: 100 INJECTION, SOLUTION INTRATHECAL; INTRAVENOUS; SUBCUTANEOUS at 04:08

## 2021-01-01 RX ADMIN — ALLOPURINOL 300 MG: 300 TABLET ORAL at 09:09

## 2021-01-01 RX ADMIN — FUROSEMIDE 40 MG: 10 INJECTION, SOLUTION INTRAMUSCULAR; INTRAVENOUS at 08:08

## 2021-01-01 RX ADMIN — EPINEPHRINE 1 MG: 0.1 INJECTION, SOLUTION ENDOTRACHEAL; INTRACARDIAC; INTRAVENOUS at 02:09

## 2021-01-01 RX ADMIN — FAMOTIDINE 20 MG: 20 TABLET ORAL at 10:08

## 2021-01-01 RX ADMIN — DIPHENHYDRAMINE HYDROCHLORIDE 25 MG: 25 CAPSULE ORAL at 08:09

## 2021-01-01 RX ADMIN — SEVELAMER CARBONATE 800 MG: 800 TABLET, FILM COATED ORAL at 04:09

## 2021-01-01 RX ADMIN — ACETAMINOPHEN 650 MG: 325 TABLET ORAL at 02:08

## 2021-01-01 RX ADMIN — FUROSEMIDE 80 MG: 80 TABLET ORAL at 08:08

## 2021-01-01 RX ADMIN — Medication 400 MG: at 08:09

## 2021-01-01 RX ADMIN — ALLOPURINOL 300 MG: 300 TABLET ORAL at 08:09

## 2021-01-01 RX ADMIN — TAMSULOSIN HYDROCHLORIDE 0.4 MG: 0.4 CAPSULE ORAL at 09:08

## 2021-01-01 RX ADMIN — POTASSIUM CHLORIDE 40 MEQ: 29.8 INJECTION, SOLUTION INTRAVENOUS at 08:09

## 2021-01-01 RX ADMIN — BUMETANIDE 2 MG: 1 TABLET ORAL at 09:08

## 2021-01-01 RX ADMIN — AMLODIPINE BESYLATE 5 MG: 5 TABLET ORAL at 09:08

## 2021-01-01 RX ADMIN — FUROSEMIDE 100 MG: 10 INJECTION, SOLUTION INTRAVENOUS at 02:09

## 2021-01-01 RX ADMIN — HYDROXYUREA 2000 MG: 200 CAPSULE ORAL at 10:09

## 2021-01-01 RX ADMIN — SODIUM BICARBONATE 50 MEQ: 84 INJECTION, SOLUTION INTRAVENOUS at 02:09

## 2021-01-01 RX ADMIN — PROPOFOL 45 MCG/KG/MIN: 10 INJECTION, EMULSION INTRAVENOUS at 01:09

## 2021-01-01 RX ADMIN — Medication 800 MG: at 09:08

## 2021-01-01 RX ADMIN — POTASSIUM CHLORIDE 40 MEQ: 29.8 INJECTION, SOLUTION INTRAVENOUS at 05:09

## 2021-01-01 RX ADMIN — GUAIFENESIN 600 MG: 600 TABLET, EXTENDED RELEASE ORAL at 08:08

## 2021-01-01 RX ADMIN — GUAIFENESIN AND DEXTROMETHORPHAN HYDROBROMIDE 1 TABLET: 600; 30 TABLET, EXTENDED RELEASE ORAL at 07:08

## 2021-01-01 RX ADMIN — LEVOFLOXACIN 500 MG: 500 TABLET, FILM COATED ORAL at 01:08

## 2021-01-01 RX ADMIN — ACYCLOVIR 400 MG: 200 CAPSULE ORAL at 08:08

## 2021-01-01 RX ADMIN — FUROSEMIDE 40 MG: 10 INJECTION, SOLUTION INTRAMUSCULAR; INTRAVENOUS at 06:08

## 2021-01-01 RX ADMIN — LORAZEPAM 1 MG: 1 TABLET ORAL at 09:09

## 2021-01-01 RX ADMIN — ALLOPURINOL 300 MG: 100 TABLET ORAL at 09:08

## 2021-01-01 RX ADMIN — LEVETIRACETAM 750 MG: 100 INJECTION, SOLUTION, CONCENTRATE INTRAVENOUS at 08:09

## 2021-01-01 RX ADMIN — LEVOFLOXACIN 500 MG: 500 TABLET, FILM COATED ORAL at 10:09

## 2021-01-01 RX ADMIN — IPRATROPIUM BROMIDE AND ALBUTEROL SULFATE 3 ML: .5; 2.5 SOLUTION RESPIRATORY (INHALATION) at 02:08

## 2021-01-01 RX ADMIN — CEFTRIAXONE SODIUM 2 G: 2 INJECTION, SOLUTION INTRAVENOUS at 09:08

## 2021-01-01 RX ADMIN — IPRATROPIUM BROMIDE AND ALBUTEROL SULFATE 3 ML: .5; 2.5 SOLUTION RESPIRATORY (INHALATION) at 08:09

## 2021-01-01 RX ADMIN — FLUTICASONE PROPIONATE 100 MCG: 50 SPRAY, METERED NASAL at 08:08

## 2021-01-01 RX ADMIN — PROPOFOL 50 MCG/KG/MIN: 10 INJECTION, EMULSION INTRAVENOUS at 04:09

## 2021-01-01 RX ADMIN — ALBUMIN HUMAN 25 G: 50 SOLUTION INTRAVENOUS at 03:09

## 2021-01-01 RX ADMIN — HYDROMORPHONE HYDROCHLORIDE 1 MG: 1 INJECTION, SOLUTION INTRAMUSCULAR; INTRAVENOUS; SUBCUTANEOUS at 02:08

## 2021-01-01 RX ADMIN — Medication 400 MG: at 08:08

## 2021-01-01 RX ADMIN — FUROSEMIDE 80 MG: 80 TABLET ORAL at 09:08

## 2021-01-01 RX ADMIN — FUROSEMIDE 40 MG: 10 INJECTION, SOLUTION INTRAMUSCULAR; INTRAVENOUS at 01:08

## 2021-01-01 RX ADMIN — BENZONATATE 100 MG: 100 CAPSULE, LIQUID FILLED ORAL at 10:09

## 2021-01-01 RX ADMIN — DEXAMETHASONE 1 DROP: 1 SUSPENSION OPHTHALMIC at 03:08

## 2021-01-01 RX ADMIN — EPINEPHRINE 0.02 MCG/KG/MIN: 1 INJECTION INTRAMUSCULAR; INTRAVENOUS; SUBCUTANEOUS at 02:09

## 2021-01-01 RX ADMIN — ACETAMINOPHEN 650 MG: 325 TABLET ORAL at 08:08

## 2021-01-01 RX ADMIN — HYDROXYUREA 1500 MG: 500 CAPSULE ORAL at 10:08

## 2021-01-01 RX ADMIN — Medication 1 TABLET: at 09:09

## 2021-01-01 RX ADMIN — MICONAZOLE NITRATE: 20 POWDER TOPICAL at 09:09

## 2021-01-01 RX ADMIN — ACETAMINOPHEN 650 MG: 325 TABLET ORAL at 09:08

## 2021-01-01 RX ADMIN — LORAZEPAM 1 MG: 1 TABLET ORAL at 11:09

## 2021-01-01 RX ADMIN — FUROSEMIDE 80 MG: 10 INJECTION, SOLUTION INTRAMUSCULAR; INTRAVENOUS at 08:09

## 2021-01-01 RX ADMIN — Medication 0.04 MCG/KG/MIN: at 05:09

## 2021-01-01 RX ADMIN — LOPERAMIDE HYDROCHLORIDE 2 MG: 2 CAPSULE ORAL at 08:08

## 2021-01-01 RX ADMIN — IPRATROPIUM BROMIDE AND ALBUTEROL SULFATE 3 ML: .5; 2.5 SOLUTION RESPIRATORY (INHALATION) at 03:09

## 2021-01-01 RX ADMIN — OXYCODONE 5 MG: 5 TABLET ORAL at 09:08

## 2021-01-01 RX ADMIN — DIPHENHYDRAMINE HYDROCHLORIDE 25 MG: 25 CAPSULE ORAL at 09:09

## 2021-01-01 RX ADMIN — BUMETANIDE 1 MG: 1 TABLET ORAL at 08:08

## 2021-01-01 RX ADMIN — FLUCONAZOLE 400 MG: 200 TABLET ORAL at 10:09

## 2021-01-01 RX ADMIN — ACYCLOVIR SODIUM 200 MG: 1000 INJECTION, SOLUTION INTRAVENOUS at 10:09

## 2021-01-01 RX ADMIN — FUROSEMIDE 60 MG: 10 INJECTION INTRAMUSCULAR; INTRAVENOUS at 02:08

## 2021-01-01 RX ADMIN — FUROSEMIDE 80 MG: 10 INJECTION, SOLUTION INTRAMUSCULAR; INTRAVENOUS at 10:09

## 2021-01-01 RX ADMIN — SEVELAMER CARBONATE 800 MG: 800 TABLET, FILM COATED ORAL at 12:09

## 2021-01-01 RX ADMIN — REMDESIVIR 100 MG: 100 INJECTION, POWDER, LYOPHILIZED, FOR SOLUTION INTRAVENOUS at 09:08

## 2021-01-01 RX ADMIN — FLUTICASONE PROPIONATE 100 MCG: 50 SPRAY, METERED NASAL at 09:09

## 2021-01-01 RX ADMIN — ACETAMINOPHEN 650 MG: 325 TABLET ORAL at 12:09

## 2021-01-01 RX ADMIN — FENTANYL CITRATE 50 MCG: 50 INJECTION INTRAMUSCULAR; INTRAVENOUS at 07:09

## 2021-01-01 RX ADMIN — HYDROXYUREA 1500 MG: 500 CAPSULE ORAL at 08:08

## 2021-01-01 RX ADMIN — IOHEXOL 100 ML: 350 INJECTION, SOLUTION INTRAVENOUS at 05:08

## 2021-01-01 RX ADMIN — HEPARIN SODIUM 5000 UNITS: 5000 INJECTION INTRAVENOUS; SUBCUTANEOUS at 02:08

## 2021-01-01 RX ADMIN — HYDROXYUREA 2000 MG: 500 CAPSULE ORAL at 09:08

## 2021-01-01 RX ADMIN — Medication 0.06 MCG/KG/MIN: at 02:09

## 2021-01-01 RX ADMIN — LOPERAMIDE HYDROCHLORIDE 2 MG: 2 CAPSULE ORAL at 03:08

## 2021-01-01 RX ADMIN — FENTANYL CITRATE 50 MCG: 50 INJECTION INTRAMUSCULAR; INTRAVENOUS at 02:09

## 2021-01-01 RX ADMIN — METOPROLOL TARTRATE 100 MG: 50 TABLET, FILM COATED ORAL at 07:08

## 2021-01-01 RX ADMIN — Medication 10 ML: at 09:08

## 2021-01-01 RX ADMIN — SODIUM CHLORIDE: 0.9 INJECTION, SOLUTION INTRAVENOUS at 12:08

## 2021-01-01 RX ADMIN — FUROSEMIDE 20 MG: 10 INJECTION INTRAMUSCULAR; INTRAVENOUS at 09:08

## 2021-01-01 RX ADMIN — AZITHROMYCIN MONOHYDRATE 250 MG: 500 INJECTION, POWDER, LYOPHILIZED, FOR SOLUTION INTRAVENOUS at 01:08

## 2021-01-01 RX ADMIN — BENZONATATE 100 MG: 100 CAPSULE, LIQUID FILLED ORAL at 02:09

## 2021-01-01 RX ADMIN — Medication 0.12 MCG/KG/MIN: at 06:09

## 2021-01-01 RX ADMIN — ALLOPURINOL 300 MG: 100 TABLET ORAL at 07:08

## 2021-01-01 RX ADMIN — HYDROMORPHONE HYDROCHLORIDE 0.2 MG: 1 INJECTION, SOLUTION INTRAMUSCULAR; INTRAVENOUS; SUBCUTANEOUS at 12:08

## 2021-01-01 RX ADMIN — FENTANYL CITRATE 50 MCG: 50 INJECTION INTRAMUSCULAR; INTRAVENOUS at 08:09

## 2021-01-01 RX ADMIN — ALBUMIN (HUMAN) 25 G: 12.5 SOLUTION INTRAVENOUS at 03:09

## 2021-01-01 RX ADMIN — GUAIFENESIN AND DEXTROMETHORPHAN HYDROBROMIDE 1 TABLET: 600; 30 TABLET, EXTENDED RELEASE ORAL at 10:08

## 2021-01-01 RX ADMIN — AMLODIPINE BESYLATE 5 MG: 5 TABLET ORAL at 08:09

## 2021-01-01 RX ADMIN — OXYCODONE 5 MG: 5 TABLET ORAL at 11:09

## 2021-01-01 RX ADMIN — OXYCODONE 5 MG: 5 TABLET ORAL at 08:08

## 2021-01-01 RX ADMIN — IPRATROPIUM BROMIDE AND ALBUTEROL SULFATE 3 ML: .5; 2.5 SOLUTION RESPIRATORY (INHALATION) at 10:08

## 2021-01-01 RX ADMIN — FERROUS SULFATE TAB EC 325 MG (65 MG FE EQUIVALENT) 325 MG: 325 (65 FE) TABLET DELAYED RESPONSE at 08:08

## 2021-01-01 RX ADMIN — DEXAMETHASONE 6 MG: 4 TABLET ORAL at 08:08

## 2021-01-01 RX ADMIN — FUROSEMIDE 40 MG: 10 INJECTION, SOLUTION INTRAMUSCULAR; INTRAVENOUS at 04:08

## 2021-01-01 RX ADMIN — SODIUM ZIRCONIUM CYCLOSILICATE 10 G: 5 POWDER, FOR SUSPENSION ORAL at 09:08

## 2021-01-01 RX ADMIN — FUROSEMIDE 40 MG: 80 TABLET ORAL at 09:08

## 2021-01-01 RX ADMIN — METOLAZONE 5 MG: 5 TABLET ORAL at 11:09

## 2021-01-01 RX ADMIN — Medication 2 SPRAY: at 05:08

## 2021-01-01 RX ADMIN — FUROSEMIDE 120 MG: 10 INJECTION, SOLUTION INTRAMUSCULAR; INTRAVENOUS at 02:09

## 2021-01-01 RX ADMIN — FUROSEMIDE 120 MG: 10 INJECTION, SOLUTION INTRAMUSCULAR; INTRAVENOUS at 08:09

## 2021-01-01 RX ADMIN — GUAIFENESIN 600 MG: 600 TABLET, EXTENDED RELEASE ORAL at 12:08

## 2021-01-01 RX ADMIN — FUROSEMIDE 100 MG: 10 INJECTION, SOLUTION INTRAVENOUS at 09:09

## 2021-01-01 RX ADMIN — PROPOFOL 45 MCG/KG/MIN: 10 INJECTION, EMULSION INTRAVENOUS at 12:09

## 2021-01-01 RX ADMIN — FENTANYL CITRATE 50 MCG: 50 INJECTION INTRAMUSCULAR; INTRAVENOUS at 10:09

## 2021-01-01 RX ADMIN — SODIUM CHLORIDE: 0.9 INJECTION, SOLUTION INTRAVENOUS at 10:08

## 2021-01-01 RX ADMIN — FUROSEMIDE 60 MG: 10 INJECTION, SOLUTION INTRAMUSCULAR; INTRAVENOUS at 08:08

## 2021-01-01 RX ADMIN — LOPERAMIDE HYDROCHLORIDE 2 MG: 2 CAPSULE ORAL at 10:08

## 2021-01-01 RX ADMIN — FENTANYL CITRATE 50 MCG: 50 INJECTION INTRAMUSCULAR; INTRAVENOUS at 12:09

## 2021-01-01 RX ADMIN — EPINEPHRINE 1 MG: 0.1 INJECTION, SOLUTION ENDOTRACHEAL; INTRACARDIAC; INTRAVENOUS at 03:09

## 2021-01-01 RX ADMIN — TRAMADOL HYDROCHLORIDE 50 MG: 50 TABLET, COATED ORAL at 05:08

## 2021-01-01 RX ADMIN — POTASSIUM CHLORIDE 40 MEQ: 1500 TABLET, EXTENDED RELEASE ORAL at 02:08

## 2021-01-01 RX ADMIN — METOPROLOL TARTRATE 100 MG: 50 TABLET, FILM COATED ORAL at 10:09

## 2021-01-01 RX ADMIN — PROPOFOL 50 MCG/KG/MIN: 10 INJECTION, EMULSION INTRAVENOUS at 06:09

## 2021-01-01 RX ADMIN — Medication 400 MG: at 09:08

## 2021-01-01 RX ADMIN — PROPOFOL 20 MCG/KG/MIN: 10 INJECTION, EMULSION INTRAVENOUS at 03:09

## 2021-01-01 RX ADMIN — BENZONATATE 100 MG: 100 CAPSULE, LIQUID FILLED ORAL at 06:09

## 2021-01-01 RX ADMIN — TAMSULOSIN HYDROCHLORIDE 0.4 MG: 0.4 CAPSULE ORAL at 08:09

## 2021-01-01 RX ADMIN — FAMOTIDINE 20 MG: 20 TABLET ORAL at 08:09

## 2021-01-01 RX ADMIN — MICONAZOLE NITRATE: 20 POWDER TOPICAL at 03:08

## 2021-01-01 RX ADMIN — IPRATROPIUM BROMIDE AND ALBUTEROL SULFATE 3 ML: .5; 2.5 SOLUTION RESPIRATORY (INHALATION) at 02:09

## 2021-01-01 RX ADMIN — MICONAZOLE NITRATE 1 EACH: 20 POWDER TOPICAL at 09:08

## 2021-01-01 RX ADMIN — FENTANYL CITRATE 50 MCG: 50 INJECTION, SOLUTION INTRAMUSCULAR; INTRAVENOUS at 05:09

## 2021-01-01 RX ADMIN — FUROSEMIDE 60 MG: 10 INJECTION, SOLUTION INTRAVENOUS at 01:08

## 2021-01-01 RX ADMIN — MICONAZOLE NITRATE: 20 POWDER TOPICAL at 10:09

## 2021-01-01 RX ADMIN — Medication 1 TABLET: at 08:08

## 2021-01-01 RX ADMIN — FLUTICASONE PROPIONATE 100 MCG: 50 SPRAY, METERED NASAL at 08:09

## 2021-01-01 RX ADMIN — SODIUM CHLORIDE 5 ML/HR: 0.9 INJECTION, SOLUTION INTRAVENOUS at 08:09

## 2021-01-01 RX ADMIN — POTASSIUM CHLORIDE 20 MEQ: 14.9 INJECTION, SOLUTION INTRAVENOUS at 05:09

## 2021-01-01 RX ADMIN — PHYTONADIONE 10 MG: 10 INJECTION, EMULSION INTRAMUSCULAR; INTRAVENOUS; SUBCUTANEOUS at 12:09

## 2021-01-01 RX ADMIN — Medication 0.13 MCG/KG/MIN: at 11:09

## 2021-01-01 RX ADMIN — Medication 0.08 MCG/KG/MIN: at 10:09

## 2021-01-01 RX ADMIN — ACETAMINOPHEN 650 MG: 325 TABLET ORAL at 08:09

## 2021-01-01 RX ADMIN — IPRATROPIUM BROMIDE AND ALBUTEROL SULFATE 3 ML: .5; 2.5 SOLUTION RESPIRATORY (INHALATION) at 05:08

## 2021-01-01 RX ADMIN — LIDOCAINE HYDROCHLORIDE 10 ML: 20 INJECTION, SOLUTION INFILTRATION; PERINEURAL at 03:08

## 2021-01-01 RX ADMIN — GUAIFENESIN 600 MG: 600 TABLET, EXTENDED RELEASE ORAL at 09:09

## 2021-01-01 RX ADMIN — FUROSEMIDE 100 MG: 10 INJECTION, SOLUTION INTRAVENOUS at 04:09

## 2021-01-01 RX ADMIN — FAMOTIDINE 20 MG: 20 TABLET ORAL at 09:09

## 2021-01-01 RX ADMIN — POTASSIUM CHLORIDE 40 MEQ: 1500 TABLET, EXTENDED RELEASE ORAL at 03:08

## 2021-01-01 RX ADMIN — OXYCODONE 5 MG: 5 TABLET ORAL at 02:08

## 2021-01-01 RX ADMIN — OXYCODONE 5 MG: 5 TABLET ORAL at 06:08

## 2021-01-01 RX ADMIN — IPRATROPIUM BROMIDE AND ALBUTEROL SULFATE 3 ML: .5; 2.5 SOLUTION RESPIRATORY (INHALATION) at 12:09

## 2021-01-01 RX ADMIN — Medication 1 TABLET: at 08:09

## 2021-01-01 RX ADMIN — ACYCLOVIR 400 MG: 200 CAPSULE ORAL at 09:09

## 2021-01-01 RX ADMIN — DEXAMETHASONE 6 MG: 4 TABLET ORAL at 10:08

## 2021-01-01 RX ADMIN — IPRATROPIUM BROMIDE AND ALBUTEROL SULFATE 3 ML: .5; 2.5 SOLUTION RESPIRATORY (INHALATION) at 06:08

## 2021-01-01 RX ADMIN — SEVELAMER CARBONATE 1600 MG: 800 TABLET, FILM COATED ORAL at 07:08

## 2021-01-01 RX ADMIN — ENOXAPARIN SODIUM 40 MG: 40 INJECTION SUBCUTANEOUS at 06:08

## 2021-01-01 RX ADMIN — DIPHENHYDRAMINE HYDROCHLORIDE 25 MG: 25 CAPSULE ORAL at 07:08

## 2021-01-01 RX ADMIN — FUROSEMIDE 40 MG: 10 INJECTION, SOLUTION INTRAMUSCULAR; INTRAVENOUS at 05:08

## 2021-01-01 RX ADMIN — SEVELAMER CARBONATE 800 MG: 800 TABLET, FILM COATED ORAL at 08:09

## 2021-01-01 RX ADMIN — FUROSEMIDE 40 MG: 80 TABLET ORAL at 10:08

## 2021-01-01 RX ADMIN — HEPARIN SODIUM 5000 UNITS: 5000 INJECTION INTRAVENOUS; SUBCUTANEOUS at 05:08

## 2021-01-01 RX ADMIN — POTASSIUM CHLORIDE 40 MEQ: 29.8 INJECTION, SOLUTION INTRAVENOUS at 07:09

## 2021-01-01 RX ADMIN — LOPERAMIDE HYDROCHLORIDE 2 MG: 2 CAPSULE ORAL at 01:08

## 2021-01-01 RX ADMIN — FUROSEMIDE 80 MG: 10 INJECTION, SOLUTION INTRAMUSCULAR; INTRAVENOUS at 03:08

## 2021-01-01 RX ADMIN — AMLODIPINE BESYLATE 5 MG: 5 TABLET ORAL at 07:08

## 2021-01-01 RX ADMIN — OXYCODONE 5 MG: 5 TABLET ORAL at 04:08

## 2021-01-01 RX ADMIN — AMLODIPINE BESYLATE 5 MG: 5 TABLET ORAL at 09:09

## 2021-01-01 RX ADMIN — FUROSEMIDE 40 MG: 40 TABLET ORAL at 09:08

## 2021-01-01 RX ADMIN — FUROSEMIDE 80 MG: 10 INJECTION, SOLUTION INTRAMUSCULAR; INTRAVENOUS at 03:09

## 2021-01-01 RX ADMIN — LOPERAMIDE HYDROCHLORIDE 2 MG: 2 CAPSULE ORAL at 09:08

## 2021-01-01 RX ADMIN — HEPARIN SODIUM 5000 UNITS: 5000 INJECTION INTRAVENOUS; SUBCUTANEOUS at 10:08

## 2021-01-01 RX ADMIN — PROPOFOL 45 MCG/KG/MIN: 10 INJECTION, EMULSION INTRAVENOUS at 09:09

## 2021-01-01 RX ADMIN — AMLODIPINE BESYLATE 5 MG: 5 TABLET ORAL at 10:08

## 2021-01-01 RX ADMIN — GUAIFENESIN AND DEXTROMETHORPHAN HYDROBROMIDE 1 TABLET: 600; 30 TABLET, EXTENDED RELEASE ORAL at 02:08

## 2021-01-01 RX ADMIN — FLUCONAZOLE 400 MG: 200 TABLET ORAL at 08:09

## 2021-01-01 RX ADMIN — OXYCODONE 5 MG: 5 TABLET ORAL at 10:08

## 2021-01-01 RX ADMIN — PROPOFOL 10 MCG/KG/MIN: 10 INJECTION, EMULSION INTRAVENOUS at 08:09

## 2021-01-01 RX ADMIN — Medication 2 SPRAY: at 08:08

## 2021-01-01 RX ADMIN — PROPOFOL 35 MCG/KG/MIN: 10 INJECTION, EMULSION INTRAVENOUS at 12:09

## 2021-01-01 RX ADMIN — DEXAMETHASONE 1 DROP: 1 SUSPENSION OPHTHALMIC at 04:08

## 2021-01-01 RX ADMIN — PIPERACILLIN SODIUM AND TAZOBACTAM SODIUM 4.5 G: 4; .5 INJECTION, POWDER, FOR SOLUTION INTRAVENOUS at 12:09

## 2021-01-01 RX ADMIN — OXYCODONE 5 MG: 5 TABLET ORAL at 03:09

## 2021-01-01 RX ADMIN — FLUCONAZOLE 400 MG: 200 TABLET ORAL at 01:08

## 2021-01-01 RX ADMIN — FENTANYL CITRATE 50 MCG: 50 INJECTION INTRAMUSCULAR; INTRAVENOUS at 09:09

## 2021-01-01 RX ADMIN — DIPHENHYDRAMINE HYDROCHLORIDE 25 MG: 25 CAPSULE ORAL at 08:08

## 2021-01-01 RX ADMIN — TAMSULOSIN HYDROCHLORIDE 0.4 MG: 0.4 CAPSULE ORAL at 09:09

## 2021-01-01 RX ADMIN — EPINEPHRINE 0.08 MCG/KG/MIN: 1 INJECTION INTRAMUSCULAR; INTRAVENOUS; SUBCUTANEOUS at 02:09

## 2021-01-01 RX ADMIN — FUROSEMIDE 40 MG: 10 INJECTION, SOLUTION INTRAMUSCULAR; INTRAVENOUS at 09:08

## 2021-01-01 RX ADMIN — LORAZEPAM 1 MG: 1 TABLET ORAL at 09:08

## 2021-01-01 RX ADMIN — PROPOFOL 45 MCG/KG/MIN: 10 INJECTION, EMULSION INTRAVENOUS at 05:09

## 2021-01-01 RX ADMIN — IPRATROPIUM BROMIDE AND ALBUTEROL SULFATE 3 ML: .5; 2.5 SOLUTION RESPIRATORY (INHALATION) at 09:09

## 2021-01-01 RX ADMIN — ONDANSETRON 8 MG: 2 INJECTION INTRAMUSCULAR; INTRAVENOUS at 04:08

## 2021-01-01 RX ADMIN — IPRATROPIUM BROMIDE AND ALBUTEROL SULFATE 3 ML: .5; 2.5 SOLUTION RESPIRATORY (INHALATION) at 11:09

## 2021-01-01 RX ADMIN — Medication 400 MG: at 09:09

## 2021-01-01 RX ADMIN — FUROSEMIDE 100 MG: 10 INJECTION, SOLUTION INTRAVENOUS at 10:09

## 2021-01-01 RX ADMIN — MAGNESIUM SULFATE HEPTAHYDRATE 2 G: 500 INJECTION, SOLUTION INTRAMUSCULAR; INTRAVENOUS at 02:09

## 2021-01-01 RX ADMIN — SEVELAMER CARBONATE 800 MG: 800 TABLET, FILM COATED ORAL at 06:08

## 2021-01-01 RX ADMIN — MAGNESIUM SULFATE 2 G: 2 INJECTION INTRAVENOUS at 07:09

## 2021-01-01 RX ADMIN — FENTANYL CITRATE 75 MCG/HR: 50 INJECTION, SOLUTION INTRAMUSCULAR; INTRAVENOUS at 10:09

## 2021-01-01 RX ADMIN — ALLOPURINOL 300 MG: 300 TABLET ORAL at 10:09

## 2021-01-01 RX ADMIN — SODIUM CHLORIDE: 0.9 INJECTION, SOLUTION INTRAVENOUS at 04:08

## 2021-01-01 RX ADMIN — SODIUM CHLORIDE: 0.9 INJECTION, SOLUTION INTRAVENOUS at 01:08

## 2021-01-01 RX ADMIN — DIPHENHYDRAMINE HYDROCHLORIDE 25 MG: 25 CAPSULE ORAL at 06:08

## 2021-01-01 RX ADMIN — POTASSIUM CHLORIDE 20 MEQ: 200 INJECTION, SOLUTION INTRAVENOUS at 04:09

## 2021-01-01 RX ADMIN — BENZONATATE 100 MG: 100 CAPSULE, LIQUID FILLED ORAL at 12:08

## 2021-01-01 RX ADMIN — HYDROXYUREA 1000 MG: 500 CAPSULE ORAL at 08:08

## 2021-01-01 RX ADMIN — MICONAZOLE NITRATE: 20 POWDER TOPICAL at 11:08

## 2021-01-01 RX ADMIN — SODIUM ZIRCONIUM CYCLOSILICATE 10 G: 5 POWDER, FOR SUSPENSION ORAL at 04:08

## 2021-01-01 RX ADMIN — BENZONATATE 100 MG: 100 CAPSULE, LIQUID FILLED ORAL at 08:08

## 2021-01-01 RX ADMIN — DIPHENHYDRAMINE HYDROCHLORIDE 25 MG: 25 CAPSULE ORAL at 04:09

## 2021-01-01 RX ADMIN — REMDESIVIR 200 MG: 100 INJECTION, POWDER, LYOPHILIZED, FOR SOLUTION INTRAVENOUS at 11:08

## 2021-01-01 RX ADMIN — ACETAMINOPHEN 650 MG: 325 TABLET ORAL at 05:08

## 2021-01-01 RX ADMIN — CALCIUM CHLORIDE 1 G: 100 INJECTION, SOLUTION INTRAVENOUS at 02:09

## 2021-01-01 RX ADMIN — FUROSEMIDE 40 MG: 10 INJECTION, SOLUTION INTRAMUSCULAR; INTRAVENOUS at 10:08

## 2021-01-01 RX ADMIN — FLUTICASONE PROPIONATE 100 MCG: 50 SPRAY, METERED NASAL at 10:08

## 2021-01-01 RX ADMIN — LORAZEPAM 1 MG: 2 INJECTION INTRAMUSCULAR; INTRAVENOUS at 10:09

## 2021-01-01 RX ADMIN — FLUTICASONE PROPIONATE 100 MCG: 50 SPRAY, METERED NASAL at 01:08

## 2021-01-01 RX ADMIN — Medication 1 TABLET: at 07:08

## 2021-01-01 RX ADMIN — FAMOTIDINE 20 MG: 20 TABLET ORAL at 10:09

## 2021-01-01 RX ADMIN — LORAZEPAM 1 MG: 1 TABLET ORAL at 03:09

## 2021-01-01 RX ADMIN — OXYCODONE 5 MG: 5 TABLET ORAL at 10:09

## 2021-01-01 RX ADMIN — LOPERAMIDE HYDROCHLORIDE 2 MG: 2 CAPSULE ORAL at 05:08

## 2021-01-01 RX ADMIN — PROPOFOL 50 MCG/KG/MIN: 10 INJECTION, EMULSION INTRAVENOUS at 05:09

## 2021-01-01 RX ADMIN — POTASSIUM CHLORIDE 40 MEQ: 29.8 INJECTION, SOLUTION INTRAVENOUS at 03:09

## 2021-01-01 RX ADMIN — HEPARIN SODIUM 5000 UNITS: 5000 INJECTION INTRAVENOUS; SUBCUTANEOUS at 09:08

## 2021-01-01 RX ADMIN — METOPROLOL TARTRATE 100 MG: 50 TABLET, FILM COATED ORAL at 09:09

## 2021-01-01 RX ADMIN — GUAIFENESIN AND DEXTROMETHORPHAN HYDROBROMIDE 1 TABLET: 600; 30 TABLET, EXTENDED RELEASE ORAL at 04:08

## 2021-01-01 RX ADMIN — BENZONATATE 100 MG: 100 CAPSULE, LIQUID FILLED ORAL at 06:08

## 2021-01-01 RX ADMIN — ALLOPURINOL 300 MG: 300 TABLET ORAL at 12:09

## 2021-01-01 RX ADMIN — ALLOPURINOL 100 MG: 100 TABLET ORAL at 09:08

## 2021-01-01 RX ADMIN — DEXAMETHASONE SODIUM PHOSPHATE 8 MG: 10 INJECTION INTRAMUSCULAR; INTRAVENOUS at 03:08

## 2021-01-01 RX ADMIN — ACETAMINOPHEN 650 MG: 325 TABLET ORAL at 10:08

## 2021-01-01 RX ADMIN — GUAIFENESIN AND DEXTROMETHORPHAN HYDROBROMIDE 1 TABLET: 600; 30 TABLET, EXTENDED RELEASE ORAL at 09:08

## 2021-01-01 RX ADMIN — Medication 2 SPRAY: at 11:08

## 2021-01-01 RX ADMIN — Medication 0.09 MCG/KG/MIN: at 02:09

## 2021-01-01 RX ADMIN — GUAIFENESIN AND DEXTROMETHORPHAN HYDROBROMIDE 1 TABLET: 600; 30 TABLET, EXTENDED RELEASE ORAL at 06:09

## 2021-01-01 RX ADMIN — METOLAZONE 5 MG: 5 TABLET ORAL at 09:09

## 2021-01-01 RX ADMIN — Medication 2 SPRAY: at 07:08

## 2021-01-01 RX ADMIN — POLYETHYLENE GLYCOL 3350 17 G: 17 POWDER, FOR SOLUTION ORAL at 12:09

## 2021-01-01 RX ADMIN — ENOXAPARIN SODIUM 40 MG: 40 INJECTION SUBCUTANEOUS at 03:08

## 2021-01-01 RX ADMIN — DEXAMETHASONE 1 DROP: 1 SUSPENSION OPHTHALMIC at 09:08

## 2021-01-01 RX ADMIN — LORAZEPAM 0.5 MG: 0.5 TABLET ORAL at 04:08

## 2021-01-01 RX ADMIN — DOCUSATE SODIUM 50 MG AND SENNOSIDES 8.6 MG 1 TABLET: 8.6; 5 TABLET, FILM COATED ORAL at 09:08

## 2021-01-01 RX ADMIN — FUROSEMIDE 20 MG: 10 INJECTION INTRAMUSCULAR; INTRAVENOUS at 01:08

## 2021-01-01 RX ADMIN — Medication 400 MG: at 10:08

## 2021-01-01 RX ADMIN — DAPTOMYCIN 785 MG: 350 INJECTION, POWDER, LYOPHILIZED, FOR SOLUTION INTRAVENOUS at 11:09

## 2021-01-01 RX ADMIN — SEVELAMER CARBONATE 1600 MG: 800 TABLET, FILM COATED ORAL at 09:08

## 2021-01-01 RX ADMIN — GUAIFENESIN AND DEXTROMETHORPHAN HYDROBROMIDE 1 TABLET: 600; 30 TABLET, EXTENDED RELEASE ORAL at 05:08

## 2021-01-01 RX ADMIN — BENZONATATE 100 MG: 100 CAPSULE, LIQUID FILLED ORAL at 03:08

## 2021-01-01 RX ADMIN — Medication 1 TABLET: at 10:09

## 2021-01-01 RX ADMIN — HYDROXYUREA 2000 MG: 200 CAPSULE ORAL at 09:09

## 2021-01-01 RX ADMIN — SODIUM CHLORIDE SOLN NEBU 3% 4 ML: 3 NEBU SOLN at 01:08

## 2021-01-01 RX ADMIN — POLYETHYLENE GLYCOL 3350 17 G: 17 POWDER, FOR SOLUTION ORAL at 08:09

## 2021-01-01 RX ADMIN — FENTANYL CITRATE 50 MCG/HR: 50 INJECTION, SOLUTION INTRAMUSCULAR; INTRAVENOUS at 01:09

## 2021-01-01 RX ADMIN — REMDESIVIR 100 MG: 100 INJECTION, POWDER, LYOPHILIZED, FOR SOLUTION INTRAVENOUS at 08:08

## 2021-01-01 RX ADMIN — FLUTICASONE PROPIONATE 100 MCG: 50 SPRAY, METERED NASAL at 10:09

## 2021-01-01 RX ADMIN — VANCOMYCIN HYDROCHLORIDE 2000 MG: 10 INJECTION, POWDER, LYOPHILIZED, FOR SOLUTION INTRAVENOUS at 01:09

## 2021-01-01 RX ADMIN — LOPERAMIDE HYDROCHLORIDE 2 MG: 2 CAPSULE ORAL at 02:08

## 2021-01-01 RX ADMIN — CEFTRIAXONE SODIUM 2 G: 2 INJECTION, SOLUTION INTRAVENOUS at 10:08

## 2021-01-01 RX ADMIN — OXYCODONE 5 MG: 5 TABLET ORAL at 02:09

## 2021-01-01 RX ADMIN — PIPERACILLIN SODIUM AND TAZOBACTAM SODIUM 4.5 G: 4; .5 INJECTION, POWDER, FOR SOLUTION INTRAVENOUS at 03:09

## 2021-01-01 RX ADMIN — LEVETIRACETAM 2000 MG: 100 INJECTION, SOLUTION, CONCENTRATE INTRAVENOUS at 05:09

## 2021-01-01 RX ADMIN — FLUCONAZOLE 400 MG: 2 INJECTION, SOLUTION INTRAVENOUS at 11:09

## 2021-01-01 RX ADMIN — FERROUS SULFATE TAB EC 325 MG (65 MG FE EQUIVALENT) 325 MG: 325 (65 FE) TABLET DELAYED RESPONSE at 10:08

## 2021-01-01 RX ADMIN — Medication 0.16 MCG/KG/MIN: at 04:09

## 2021-01-01 RX ADMIN — ACETAMINOPHEN 650 MG: 325 TABLET ORAL at 07:08

## 2021-01-01 RX ADMIN — FUROSEMIDE 80 MG: 80 TABLET ORAL at 06:08

## 2021-01-01 RX ADMIN — FUROSEMIDE 40 MG: 10 INJECTION, SOLUTION INTRAMUSCULAR; INTRAVENOUS at 12:08

## 2021-01-01 RX ADMIN — DEXAMETHASONE 1 DROP: 1 SUSPENSION OPHTHALMIC at 10:08

## 2021-01-01 RX ADMIN — Medication 0.06 MCG/KG/MIN: at 07:09

## 2021-01-01 RX ADMIN — HYDROXYUREA 1000 MG: 500 CAPSULE ORAL at 09:08

## 2021-01-01 RX ADMIN — HEPARIN SODIUM 7500 UNITS: 5000 INJECTION INTRAVENOUS; SUBCUTANEOUS at 10:08

## 2021-01-01 RX ADMIN — FUROSEMIDE 100 MG: 10 INJECTION, SOLUTION INTRAVENOUS at 03:09

## 2021-01-01 RX ADMIN — Medication 0.05 MCG/KG/MIN: at 09:09

## 2021-01-01 RX ADMIN — Medication 0.06 MCG/KG/MIN: at 03:09

## 2021-01-01 RX ADMIN — HEPARIN SODIUM 5000 UNITS: 5000 INJECTION INTRAVENOUS; SUBCUTANEOUS at 04:08

## 2021-01-01 RX ADMIN — GUAIFENESIN AND DEXTROMETHORPHAN HYDROBROMIDE 1 TABLET: 600; 30 TABLET, EXTENDED RELEASE ORAL at 08:09

## 2021-01-01 RX ADMIN — CEFTRIAXONE SODIUM 2 G: 2 INJECTION, SOLUTION INTRAVENOUS at 12:08

## 2021-01-01 RX ADMIN — SODIUM ZIRCONIUM CYCLOSILICATE 10 G: 5 POWDER, FOR SUSPENSION ORAL at 08:08

## 2021-01-01 RX ADMIN — SEVELAMER CARBONATE 800 MG: 800 TABLET, FILM COATED ORAL at 01:08

## 2021-01-01 RX ADMIN — BENZONATATE 100 MG: 100 CAPSULE, LIQUID FILLED ORAL at 05:08

## 2021-01-01 RX ADMIN — LORAZEPAM 0.5 MG: 0.5 TABLET ORAL at 12:08

## 2021-01-01 RX ADMIN — IPRATROPIUM BROMIDE AND ALBUTEROL SULFATE 3 ML: .5; 2.5 SOLUTION RESPIRATORY (INHALATION) at 11:08

## 2021-01-01 RX ADMIN — SODIUM CHLORIDE: 0.9 INJECTION, SOLUTION INTRAVENOUS at 03:08

## 2021-01-01 RX ADMIN — REMDESIVIR 100 MG: 100 INJECTION, POWDER, LYOPHILIZED, FOR SOLUTION INTRAVENOUS at 10:08

## 2021-01-01 RX ADMIN — HYDROXYUREA 1000 MG: 200 CAPSULE ORAL at 10:09

## 2021-01-01 RX ADMIN — BUMETANIDE 1 MG: 1 TABLET ORAL at 10:08

## 2021-01-01 RX ADMIN — Medication 0.13 MCG/KG/MIN: at 06:09

## 2021-01-01 RX ADMIN — LORAZEPAM 2 MG: 2 INJECTION INTRAMUSCULAR; INTRAVENOUS at 08:09

## 2021-01-01 RX ADMIN — LORAZEPAM 2 MG: 2 INJECTION INTRAMUSCULAR at 08:09

## 2021-01-01 RX ADMIN — FAMOTIDINE 20 MG: 20 TABLET ORAL at 07:08

## 2021-01-01 RX ADMIN — SODIUM CHLORIDE 6 MG: 9 INJECTION, SOLUTION INTRAVENOUS at 08:08

## 2021-01-01 RX ADMIN — HYDROXYUREA 1000 MG: 500 CAPSULE ORAL at 12:08

## 2021-01-01 RX ADMIN — BUMETANIDE 1 MG: 1 TABLET ORAL at 04:08

## 2021-01-01 RX ADMIN — ACYCLOVIR 400 MG: 200 CAPSULE ORAL at 01:08

## 2021-01-01 RX ADMIN — Medication 0.08 MCG/KG/MIN: at 05:09

## 2021-01-01 RX ADMIN — DIPHENHYDRAMINE HYDROCHLORIDE 25 MG: 25 CAPSULE ORAL at 10:08

## 2021-01-01 RX ADMIN — SODIUM CHLORIDE: 0.9 INJECTION, SOLUTION INTRAVENOUS at 02:09

## 2021-01-01 RX ADMIN — GUAIFENESIN AND DEXTROMETHORPHAN HYDROBROMIDE 1 TABLET: 600; 30 TABLET, EXTENDED RELEASE ORAL at 01:08

## 2021-01-01 RX ADMIN — GUAIFENESIN 600 MG: 600 TABLET, EXTENDED RELEASE ORAL at 10:09

## 2021-01-01 RX ADMIN — TAMSULOSIN HYDROCHLORIDE 0.4 MG: 0.4 CAPSULE ORAL at 10:09

## 2021-08-03 PROBLEM — E87.1 HYPONATREMIA: Status: ACTIVE | Noted: 2021-01-01

## 2021-08-03 PROBLEM — I50.9 CONGESTIVE HEART FAILURE: Status: ACTIVE | Noted: 2017-10-04

## 2021-08-03 PROBLEM — D68.9 COAGULATION DISORDER: Status: ACTIVE | Noted: 2021-01-01

## 2021-08-03 PROBLEM — I48.91 ATRIAL FIBRILLATION: Status: ACTIVE | Noted: 2021-01-01

## 2021-08-03 PROBLEM — N13.8 BENIGN PROSTATIC HYPERPLASIA WITH URINARY OBSTRUCTION: Status: ACTIVE | Noted: 2021-01-01

## 2021-08-03 PROBLEM — S06.4XAA EPIDURAL HEMATOMA: Status: ACTIVE | Noted: 2017-10-04

## 2021-08-03 PROBLEM — I10 HYPERTENSION: Status: ACTIVE | Noted: 2017-10-04

## 2021-08-03 PROBLEM — G47.33 OSA (OBSTRUCTIVE SLEEP APNEA): Status: ACTIVE | Noted: 2017-10-08

## 2021-08-03 PROBLEM — E66.01 MORBID OBESITY DUE TO EXCESS CALORIES: Status: ACTIVE | Noted: 2017-10-04

## 2021-08-03 PROBLEM — S32.010A CLOSED COMPRESSION FRACTURE OF L1 LUMBAR VERTEBRA, INITIAL ENCOUNTER: Status: ACTIVE | Noted: 2017-10-04

## 2021-08-03 PROBLEM — K72.90 LIVER FAILURE: Status: ACTIVE | Noted: 2021-01-01

## 2021-08-03 PROBLEM — N40.1 BENIGN PROSTATIC HYPERPLASIA WITH URINARY OBSTRUCTION: Status: ACTIVE | Noted: 2021-01-01

## 2021-08-03 PROBLEM — I51.9 LEFT VENTRICULAR DYSFUNCTION: Status: ACTIVE | Noted: 2021-01-01

## 2021-08-03 PROBLEM — D75.81 MYELOFIBROSIS: Status: ACTIVE | Noted: 2017-10-16

## 2021-08-03 PROBLEM — Z87.438 HISTORY OF BPH: Status: ACTIVE | Noted: 2017-10-08

## 2021-08-03 PROBLEM — D64.9 ANEMIA: Status: ACTIVE | Noted: 2017-10-04

## 2021-08-08 PROBLEM — J12.82 PNEUMONIA DUE TO COVID-19 VIRUS: Status: ACTIVE | Noted: 2021-01-01

## 2021-08-08 PROBLEM — U07.1 PNEUMONIA DUE TO COVID-19 VIRUS: Status: ACTIVE | Noted: 2021-01-01

## 2021-08-08 PROBLEM — C92.00 AML (ACUTE MYELOBLASTIC LEUKEMIA): Status: ACTIVE | Noted: 2021-01-01

## 2021-08-09 PROBLEM — N17.9 AKI (ACUTE KIDNEY INJURY): Status: ACTIVE | Noted: 2021-01-01

## 2021-08-09 PROBLEM — I50.42 CHRONIC COMBINED SYSTOLIC AND DIASTOLIC HEART FAILURE: Status: ACTIVE | Noted: 2021-01-01

## 2021-08-09 PROBLEM — J96.02 ACUTE HYPERCAPNIC RESPIRATORY FAILURE: Status: ACTIVE | Noted: 2021-01-01

## 2021-08-09 PROBLEM — I50.42 CHRONIC COMBINED SYSTOLIC AND DIASTOLIC HEART FAILURE: Status: RESOLVED | Noted: 2021-01-01 | Resolved: 2021-01-01

## 2021-08-11 PROBLEM — Z51.5 PALLIATIVE CARE ENCOUNTER: Status: ACTIVE | Noted: 2021-01-01

## 2021-08-25 PROBLEM — D63.0 ANEMIA IN NEOPLASTIC DISEASE: Status: ACTIVE | Noted: 2017-10-04

## 2021-08-25 PROBLEM — D72.829 LEUKOCYTOSIS: Status: ACTIVE | Noted: 2021-01-01

## 2021-08-25 PROBLEM — D69.6 THROMBOCYTOPENIA: Status: ACTIVE | Noted: 2021-01-01

## 2021-08-27 PROBLEM — N17.9 AKI (ACUTE KIDNEY INJURY): Status: RESOLVED | Noted: 2021-01-01 | Resolved: 2021-01-01

## 2021-08-27 PROBLEM — J96.02 ACUTE HYPERCAPNIC RESPIRATORY FAILURE: Status: RESOLVED | Noted: 2021-01-01 | Resolved: 2021-01-01

## 2021-08-31 PROBLEM — J96.01 ACUTE HYPOXEMIC RESPIRATORY FAILURE: Status: ACTIVE | Noted: 2021-01-01

## 2021-09-02 PROBLEM — J96.90 RESPIRATORY FAILURE: Status: ACTIVE | Noted: 2021-01-01

## 2021-09-06 PROBLEM — J96.90 RESPIRATORY FAILURE: Status: RESOLVED | Noted: 2021-01-01 | Resolved: 2021-01-01

## 2021-09-06 PROBLEM — G93.40 ACUTE ENCEPHALOPATHY: Status: ACTIVE | Noted: 2021-01-01

## 2021-09-10 LAB — BACTERIA BLD CULT: NORMAL

## 2021-09-11 LAB
BACTERIA BLD CULT: ABNORMAL

## 2021-09-12 LAB — BACTERIA BLD CULT: NORMAL

## 2022-01-05 ENCOUNTER — TELEPHONE (OUTPATIENT)
Dept: HEMATOLOGY/ONCOLOGY | Facility: CLINIC | Age: 62
End: 2022-01-05
Payer: MEDICARE

## 2022-04-10 ENCOUNTER — HISTORICAL (OUTPATIENT)
Dept: ADMINISTRATIVE | Facility: HOSPITAL | Age: 62
End: 2022-04-10

## 2022-04-12 ENCOUNTER — TELEPHONE (OUTPATIENT)
Dept: HEMATOLOGY/ONCOLOGY | Facility: CLINIC | Age: 62
End: 2022-04-12

## 2022-04-27 VITALS
DIASTOLIC BLOOD PRESSURE: 74 MMHG | WEIGHT: 309.94 LBS | OXYGEN SATURATION: 96 % | SYSTOLIC BLOOD PRESSURE: 118 MMHG | BODY MASS INDEX: 45.91 KG/M2 | HEIGHT: 69 IN

## 2022-05-03 NOTE — HISTORICAL [facility] CERNER
This is a historical note converted from [facility]. Formatting and pictures may have been removed.  Please reference [facility] for original formatting and attached multimedia. History of Present Illness  Past medical history: Urinary retention.? Myelofibrosis.? Anemia.? BPH.? CHF.? Hypertension.? Hyponatremia.? Leg pain.? Abnormal liver function.? Epistaxis, requiring cauterization in the past.? Obesity.? Sleep apnea.? Cataract.? Hypertension.? Hernia repair.? Gastric bypass?for weight loss (2000).? Alcohol abuse.? Noncompliance with follow-ups.? Atrial fibrillation.? Morbidly obese.? Back surgery at [facility] [place],?in 2016 or 2017.  ?  Social history:?.? Lives by himself in [place], [place]. ?Has 3 sons.? Disabled.? Drank heavily all his life, up to 5-12 beers daily?for many years;?for last 1 year,?and occasional 6 pack of beer.? No tobacco or illicit drugs.  ?  Family history:?Negative for cancers or any blood disorders.  ?  Health maintenance:  -04/27/2015: Screening colonoscopy: Normal  ?  History of present illness:  60-year-old gentleman with history of myelofibrosis, last seen in hematology clinic on 06/08/2017, subsequently lost to follow-up, now referred back to hematology for follow-up by internal medicine.  ?  History and?chronology reviewed:  -01/2014: Noted to have heterogeneous bone marrow changes in the ribs and thoracic spine on CT chest  -Bone marrow biopsy (01/08/2014): Extensive osteosclerosis and fibrosis, consistent with primary myelofibrosis.? Hematopoietic elements comprise only 10% of bone marrow.? JAK2 mutation.? Flow cytometry with <3% bone marrow blasts with no abnormal immunophenotype.? Normal male karyotype.?  -Lost to follow-up for some time  -02/19/2015: Symptomatic anemia.? PRBC x4 units.  -Alcohol abuse.? Abnormal LFTs.? Coagulopathy.? Bruising.?  -History of epistaxis.? Required cauterization in the past.  -History of hypodensity in the  spleen  -Repeat bone marrow exam 05/2017, showed 6% blasts  -History of fatty mass of right upper retroperitoneum, noted on CT in March 2015, resulting in mass-effect on suprarenal IVC  -Not a stem cell transplant candidate because of comorbidities  -Supportive care with transfusions as needed  -01/12/2016: CT A/P with contrast: Splenomegaly with 3.5 cm nodule; retroperitoneal fatty mass adjacent to IVC without change in size since 03/12/2015; bony sclerosis consistent with myelofibrosis  -05/27/2017: Bone marrow exam: Markedly hypocellular (10%) bone marrow with myelofibrosis (bone marrow is predominantly fibrotic with small clusters of hematopoietic cells composed primarily of red blood cell precursors and dysplastic appearing/partially crushed megakaryocytes); flow cytometry shows an increase in CD34 positive myeloblasts (CD33 negative), representing approximately 6% of total leukocytes, and a small population of monoclonal CD5 negative, CD10 negative B cells, representing approximately 4% of lymphocytes; normal male karyotype  -12/12/2017: Abdominal ultrasound, complete: Hepatic cirrhotic morphology and hepatomegaly (23.5 cm); splenomegaly (21.0 cm)  -03/19/2020: CT A/P with contrast (abdominal pain): Hepatosplenomegaly, unchanged since 01/12/2016 (liver 17.5 cm; spleen 22 cm)  -09/02/2020: Limited abdominal ultrasound: Hepatomegaly; coarse echotexture of liver parenchyma; in gallbladder, either sludge balls or non-shadowing stones  -03/11/2020: TTE: LVEF 60%  -09/14/2020: TTE: LVEF 25%  -09/19/2020: CT chest with contrast (shortness of breath, recurrent hyponatremia): No acute abnormality; diffuse heterogeneous sclerosis throughout the bones is unchanged  ?  Labs reviewed.  ?  -Chronic hyponatremia.? Sodium 129.  -LFTs normal  -B12 low, 199 (09/01/2020)  -Iron stores normal (09/01/2020)  -Chronic anemia.? Hemoglobin 8.5 (09/28/2020)  -MCV normal  -Leukopenia and neutropenia.? WBC 2.3.? ANC 0.77.  -Platelets  normal.  -Abnormal differential count, consistent with myelofibrosis  ?  Transfusions:  12/12/2017: PRBC x2  02/23/2015: PRBC x2  02/20/2015: PRBC x2  02/19/2015: PRBC x2  ?  Accordingly,?7-8 PRBCs?because of gastric bypass surgery in 2000.  According to him,?7?PRBC units?in the course of back surgery?at [facility], 3-4 years back.  Says that he has been taking?oral vitamin B12, chewable?as well as pills, for last 10-12 years?after gastric bypass surgery.  ?  Interval history:  10/28/2020:  Presents for hematology consultation.? Morbidly obese. ?In a wheelchair.  Main complaint is?pain in feet, left wrist, right shoulder,?and joints, 10/10, for last 3 days, worse at night.  Chronic weakness and fatigue.? Exertional dyspnea.? Ambulates with the help of a walker.  No fevers, chills, or drenching night sweats.? No anorexia or unintentional weight loss.  He is wondering why he is?rapidly gaining weight.  No abdominal pain, nausea, vomiting, jaundice, fevers, chills,?hematemesis, melena, or hematochezia.  ?  ?  Review of systems:  All systems reviewed, and found to be negative except for the symptoms detailed above.  ?  ?  Physical examination:  VITAL SIGNS:? Reviewed.? ?  GENERAL:? In no apparent distress.?  HEAD:? No signs of head trauma.  EYES:? Pupils are equal.? Extraocular motions intact.?  EARS:? Hearing grossly intact.  MOUTH:? Oropharynx is normal.  NECK:? No adenopathy, no JVD.? ?  CHEST:? Chest with clear breath sounds bilaterally.? No wheezes, rales, or rhonchi.?  CARDIAC:? Regular rate and rhythm.? S1 and S2, without murmurs, gallops, or rubs.  VASCULAR:? No Edema.? Peripheral pulses normal and equal in all extremities.  ABDOMEN:? Soft, without detectable tenderness.? No sign of distention.? No? ?rebound or guarding, and no masses palpated.? ?Bowel Sounds normal.  MUSCULOSKELETAL:? Good range of motion of all major joints. Extremities without clubbing, cyanosis or edema.?  NEUROLOGIC  EXAM:? Alert and oriented x 3.? No focal sensory or strength deficits.? ?Speech normal.? Follows commands.  PSYCHIATRIC:? Mood normal.  SKIN:? No rash or lesions.  10/28/2020: In no acute discomfort. ?Morbidly obese. ?In a wheelchair.? Complains of pain in?both feet?and left wrist.? Also, all joints hurt.? Lungs clear to auscultation. ?Heart sounds normal.? I could not palpate liver, spleen, or any other intra-abdominal organs or masses because of?morbid obesity.  ?  ?  Assessment:  #Primary myelofibrosis, diagnosed on bone marrow biopsy (01/08/2014).? JAK2 mutation negative.? Extensive osteosclerosis and fibrosis.? Cellularity 10%.? Flow cytometry with <3% bone marrow blasts.? Normal immunophenotype.  -05/27/2017: Bone marrow biopsy: Markedly hypocellular, 10%; myelofibrosis; bone marrow predominantly fibrotic; a small population of monoclonal CD5 negative, CD10 negative B cells, representing approximately 4% of lymphocytes  -Noncompliant with follow-up  -Not a candidate for allogenic stem cell transplant because of comorbidities and noncompliance  -PRBC x8 between 02/19/2015-12/12/2017  ?  #Chronic anemia, hemoglobin ~8.5 g/dL  -Normocytic  -PRBC x8 (02/19/2015-12/12/2017)  ?  #Leukopenia and neutropenia  -WBC 2.3.? ANC 0.77  Platelet count normal  ?  #History of hepatosplenomegaly secondary to myelofibrosis  -CT A/P (01/20/2016): No megalies with 3.5 cm nodule; retroperitoneal fatty mass adjacent to IVC without change since 03/12/2020; bony sclerosis, consistent with myelofibrosis  -CT A/P (03/19/2020): Hepatosplenomegaly, unchanged since 01/12/2016  ?  #Vitamin B12 deficiency  -B12 level 199 (09/01/2020)  -Iron stores normal (09/01/2020)  -10/28/2020:?Says that he has been taking?vitamin B12 orally, both tubal as well as pills,?14-12 years?after gastric bypass surgery  ?  #Morbidly obese, status post gastric bypass surgery?(2000):  -10/28/2020: Weight 322.97 LBS; height 175 cm; BMI 47.84  ?  #Atrial  fibrillation;?on anticoagulation with Xarelto  ?  #Alcohol abuse.?  #Noncompliant with follow-ups.  #History of epistaxis, requiring cauterization in the past  #History of gastric bypass?in 2000  #History of back surgery?in [place]?in 2016 or 2070  ?  ?  Plan:  Supportive care?with periodic transfusions.  If erythropoietin level <500,?then, may benefit from Procrit shots for chronic anemia.  Not a candidate for allogenic stem cell transplant because of comorbidities.  ?  Chronically anemic.  PRBC x8 (02/19/2015-12/12/2017).  Hemoglobin ~8.5 g/dL  Check erythropoietin level; if <500, then, may benefit from Procrit therapy.  Transfuse as needed for symptoms or if hemoglobin drops below 7 g/dL.  Check reticulocyte count, serum iron, TIBC, ferritin, B12 level, methylmalonic acid level, intrinsic factor antibody, LDH, haptoglobin, SPEP, DENNIS  Recall, screening colonoscopy on 04/27/2015, was normal.  ?  B12 deficiency was noted on 09/01/2020.  Will repeat B12 level now, and check methylmalonic acid level, intrinsic factor antibody.  Apparently, has been?taking vitamin B12?orally,?chewable as well as pills, for last 10-12 years?after gastric bypass surgery.  If repeat B12 deficiency is documented,?then will need to start him on?B12 shots.  ?  Follow-up visit in 10 days, with labs.  ?  Above discussed at length with him.? All questions answered.  He understands and agrees with this plan.  Physical Exam  Vitals & Measurements  T:?37.6? ?C (Oral)? HR:?103(Peripheral)? RR:?18? BP:?99/64? SpO2:?96%?  HT:?175.00?cm? WT:?146.500?kg? BMI:?47.84?   Problem List/Past Medical History  Ongoing  Acute urinary retention  Anemia  BPH with urinary obstruction  Coagulopathy  Congestive heart failure  HTN (hypertension)  Hyponatremia  Leg pain  Liver function failure  LV dysfunction  Morbid obesity(  Probable Diagnosis  )  Myelofibrosis  Myofibrosis  Primary myelofibrosis  Historical  Apnea, sleep  Back pain  Cataract  CHF - Congestive  heart failure  Hypertension  Nose bleed  Procedure/Surgical History  Transfusion of Nonautologous Red Blood Cells into Peripheral Vein, Percutaneous Approach (12/11/2017)  BONE MARROW ASPIRATION PERFORMED WITH BONE MARROW BIOPSY THROUGH THE SAME INCISION ON THE SAME DATE OF SERVICE (05/25/2017)  Bone marrow; biopsy, needle or trocar (05/25/2017)  Drainage of Bone Marrow, Percutaneous Approach, Diagnostic (05/25/2017)  Extraction of Iliac Bone Marrow, Percutaneous Approach, Diagnostic (05/25/2017)  Drainage of Right Upper Extremity, Percutaneous Approach (10/03/2016)  Drainage of Right Lower Arm Skin, External Approach (10/01/2016)  Incision and drainage of abscess (eg, carbuncle, suppurative hidradenitis, cutaneous or subcutaneous abscess, cyst, furuncle, or paronychia); simple or single (10/01/2016)  Drainage of Bladder with Drainage Device, Via Natural or Artificial Opening (09/25/2016)  Insertion of temporary indwelling bladder catheter; simple (eg, Gordon) (09/25/2016)  Control nasal hemorrhage, anterior, simple (limited cautery and/or packing) any method (02/05/2016)  Packing of Nasal Region using Packing Material (02/05/2016)  Cataract Extraction Phacoemulsification (Right) (08/20/2015)  Extracapsular cataract removal with insertion of intraocular lens prosthesis (1 stage procedure), manual or mechanical technique (eg, irrigation and aspiration or phacoemulsification) (08/20/2015)  Insertion of intraocular lens prosthesis at time of cataract extraction, one-stage (08/20/2015)  Phacoemulsification and aspiration of cataract (08/20/2015)  Cataract Extraction Phacoemulsification (Left) (06/09/2015)  Extracapsular cataract removal with insertion of intraocular lens prosthesis (1 stage procedure), manual or mechanical technique (eg, irrigation and aspiration or phacoemulsification) (06/09/2015)  Insertion of intraocular lens prosthesis at time of cataract extraction, one-stage (06/09/2015)  Phacoemulsification and  aspiration of cataract (06/09/2015)  Colonoscopy (04/27/2015)  Colonoscopy (04/27/2015)  Colonoscopy, flexible; diagnostic, including collection of specimen(s) by brushing or washing, when performed (separate procedure) (04/27/2015)  Transfusion of packed cells (02/23/2015)  Transfusion of packed cells (02/20/2015)  Biopsy Bone Marrow Aspiration (.) (01/08/2014)  Biopsy of bone marrow (01/08/2014)  gastric bypass  hernia repair   Medications  bisacodyl 5 mg ORAL enteric coated tablet, 30 mg= 6 tab(s), Oral, Once  bumetanide 2 mg oral tablet, 2 mg= 1 tab(s), Oral, BID, 1 refills  colchicine 0.6 mg oral capsule, 0.6 mg= 1 cap(s), Oral, BID  cyanocobalamin 1000 mcg oral tablet, 1000 mcg= 1 tab(s), Oral, Daily, 5 refills  ferrous sulfate 325 mg oral tablet, 325 mg= 1 tab(s), Oral, Every other day, 3 refills  Flomax 0.4 mg oral capsule, 0.4 mg= 1 cap(s), Oral, Daily, 3 refills  Lasix 80 mg oral tablet, 80 mg= 1 tab(s), Oral, BID  lisinopril 5 mg oral tablet, 5 mg= 1 tab(s), Oral, Daily, 3 refills  magnesium citrate oral liquid, 1 bottle(s)= 300 mL, Oral, Once  metoprolol tartrate 100 mg oral tablet, 100 mg= 1 tab(s), Oral, BID, 3 refills  Miralax - for colonoscopy prep, 238 gm= 14 packet(s), Oral, Once  Multi-Day Plus Minerals oral tablet, 1 tab(s), Oral, Daily  Solumedrol IV push / IM, 125 mg= 2 mL, IV Push, Once  Toradol 30 mg for IV Push, 30 mg= 1 mL, IV Push, Once  Vitamin D2 2000 intl units oral capsule, 2000 IntUnit= 1 cap(s), Oral, Daily, 3 refills  Xarelto 20mg Tablet, 20 mg= 1 tab(s), Oral, qPM, 3 refills  Allergies  No Known Allergies  Social History  Abuse/Neglect  No, 10/20/2020  No, 09/25/2020  Alcohol - High Risk, 12/04/2014  Past, 09/25/2020  Employment/School  disability, Work/School description: disabled. Operates hazardous equipment: No., 12/08/2016  Exercise - Does not exercise, 03/06/2015  Self assessment: Fair condition., 12/08/2016  Home/Environment  Lives with Spouse. Living situation:  Home/Independent. Alcohol abuse in household: No. Substance abuse in household: No. Smoker in household: No. Injuries/Abuse/Neglect in household: No. Feels unsafe at home: No. Safe place to go: Yes. Family/Friends available for support: Yes. Concern for family members at home: No. Major illness in household: No. Financial concerns: No. TV/Computer concerns: No., 12/08/2016  Nutrition/Health  Caffeine intake amount: NONE. Wants to lose weight: Yes. Sleeping concerns: Yes. Feels highly stressed: No., 11/17/2015  Regular, 03/06/2015  Other  Sexual  Sexually active: Yes., 03/06/2015  Substance Use - Denies Substance Abuse, 12/04/2014  Never, 05/20/2016  Tobacco - Denies Tobacco Use, 12/04/2014  Never (less than 100 in lifetime), N/A, 10/20/2020  Never (less than 100 in lifetime), N/A, 09/25/2020  Family History  Alcohol user: Father.  Cataract.: Father.  Hyperlipidemia.: Brother.  Hypertension.: Father.  Tobacco user: Father.  Immunizations  Vaccine Date Status Comments   influenza virus vaccine, inactivated 09/26/2020 Given    influenza virus vaccine, inactivated 10/08/2019 Recorded    influenza virus vaccine, inactivated 10/05/2018 Given tolerated well   influenza virus vaccine, inactivated 12/13/2017 Given Nursing Judgment   influenza virus vaccine, inactivated 09/28/2016 Given    influenza virus vaccine, inactivated 11/17/2015 Given    influenza virus vaccine, inactivated 02/25/2015 Given Med Not Available   pneumococcal 23-polyvalent vaccine 01/05/2014 Given    influenza virus vaccine, inactivated 01/05/2014 Given

## 2022-05-03 NOTE — HISTORICAL [facility] CERNER
This is a historical note converted from [facility]. Formatting and pictures may have been removed.  Please reference Ashanti for original formatting and attached multimedia. History of Present Illness  Past medical history: Urinary retention.? Myelofibrosis.? Anemia.? BPH.? CHF.? Hypertension.? Hyponatremia.? Leg pain.? Abnormal liver function.? Epistaxis, requiring cauterization in the past.? Obesity.? Sleep apnea.? Cataract.? Hypertension.? Hernia repair.? Gastric bypass?for weight loss (2000).? Alcohol abuse.? Noncompliance with follow-ups.? Atrial fibrillation.? Morbidly obese.? Back surgery at [facility], [place],?in 2016 or 2017.  Social history:?.? Lives by himself in [place], [place]. ?Has 3 sons.? Disabled.? Drank heavily all his life, up to 5-12 beers daily?for many years;?for last 1 year,?and occasional 6 pack of beer.? No tobacco or illicit drugs.  Family history:?Negative for cancers or any blood disorders.  Health maintenance:  -04/27/2015: Screening colonoscopy: Normal  ?  ?   Reason for follow-up:  Myelofibrosis; transfusion dependent anemia >>> AML  ?  ?   History of present illness:  60-year-old gentleman with history of myelofibrosis, last seen in hematology clinic on 06/08/2017, subsequently lost to follow-up, now referred back to hematology for follow-up by internal medicine.  ?   History and?chronology reviewed:  -01/2014: Noted to have heterogeneous bone marrow changes in the ribs and thoracic spine on CT chest  -Bone marrow biopsy (01/08/2014): Extensive osteosclerosis and fibrosis, consistent with primary myelofibrosis.? Hematopoietic elements comprise only 10% of bone marrow.? JAK2 mutation.? Flow cytometry with <3% bone marrow blasts with no abnormal immunophenotype.? Normal male karyotype.?  -Lost to follow-up for some time  -02/19/2015: Symptomatic anemia.? PRBC x4 units.  -Alcohol abuse.? Abnormal LFTs.? Coagulopathy.? Bruising.?  -History of  epistaxis.? Required cauterization in the past.  -History of hypodensity in the spleen  -Repeat bone marrow exam 05/2017, showed 6% blasts  -History of fatty mass of right upper retroperitoneum, noted on CT in March 2015, resulting in mass-effect on suprarenal IVC  -Not a stem cell transplant candidate because of comorbidities  -Supportive care with transfusions as needed  -01/12/2016: CT A/P with contrast: Splenomegaly with 3.5 cm nodule; retroperitoneal fatty mass adjacent to IVC without change in size since 03/12/2015; bony sclerosis consistent with myelofibrosis  -05/27/2017: Bone marrow exam: Markedly hypocellular (10%) bone marrow with myelofibrosis (bone marrow is predominantly fibrotic with small clusters of hematopoietic cells composed primarily of red blood cell precursors and dysplastic appearing/partially crushed megakaryocytes); flow cytometry shows an increase in CD34 positive myeloblasts (CD33 negative), representing approximately 6% of total leukocytes, and a small population of monoclonal CD5 negative, CD10 negative B cells, representing approximately 4% of lymphocytes; normal male karyotype  -12/12/2017: Abdominal ultrasound, complete: Hepatic cirrhotic morphology and hepatomegaly (23.5 cm); splenomegaly (21.0 cm)  -03/19/2020: CT A/P with contrast (abdominal pain): Hepatosplenomegaly, unchanged since 01/12/2016 (liver 17.5 cm; spleen 22 cm)  -09/02/2020: Limited abdominal ultrasound: Hepatomegaly; coarse echotexture of liver parenchyma; in gallbladder, either sludge balls or non-shadowing stones  -03/11/2020: TTE: LVEF 60%  -09/14/2020: TTE: LVEF 25%  -09/19/2020: CT chest with contrast (shortness of breath, recurrent hyponatremia): No acute abnormality; diffuse heterogeneous sclerosis throughout the bones is unchanged  ?   Labs reviewed:  ?   -Chronic hyponatremia.? Sodium 129.  -LFTs normal  -B12 low, 199 (09/01/2020)  -Iron stores normal (09/01/2020)  -Chronic anemia.? Hemoglobin 8.5  (09/28/2020)  -MCV normal  -Leukopenia and neutropenia.? WBC 2.3.? ANC 0.77.  -Platelets normal.  -Abnormal differential count, consistent with myelofibrosis  ?  Transfusions:  12/12/2017: PRBC x2  02/23/2015: PRBC x2  02/20/2015: PRBC x2  02/19/2015: PRBC x2  ?  According to him,?7-8 PRBCs?because of gastric bypass surgery in 2000.  According to him,?7?PRBC units?in the course of back surgery?at [facility], 3-4 years back.  Says that he has been taking?oral vitamin B12, chewable?as well as pills, for last 10-12 years?after gastric bypass surgery.  ?  Interval history:  10/28/2020:  Presents for hematology consultation.? Morbidly obese. ?In a wheelchair.  Main complaint is?pain in feet, left wrist, right shoulder,?and joints, 10/10, for last 3 days, worse at night.  Chronic weakness and fatigue.? Exertional dyspnea.? Ambulates with the help of a walker.  No fevers, chills, or drenching night sweats.? No anorexia or unintentional weight loss.  He is wondering why he is?rapidly gaining weight.  No abdominal pain, nausea, vomiting, jaundice, fevers, chills,?hematemesis, melena, or hematochezia.  ?  12/15/2020:  -EGD (11/04/2020) during hospitalization 10/28/2020-11/04/2020 ([facility]), negative; stool for occult blood was positive x3; outpatient colonoscopy was recommended  -Colonoscopy being planned by [facility]  -Feraheme 510 mg IV x2 (11/20/2020, 11/27/2020)  -Procrit 40,000 subcutaneously weekly, started 11/20/2020  Presents for follow-up visit. ?Doing great.? Performance status markedly improved after intravenous iron infusion with Feraheme.? Able to walk more.? Breathing much better.? Very happy.? Does not think that he needs to be transfused today.? Tolerating Procrit shots?without any side effects. ?Labs on 12/11/2020, showed ferritin 883.64 (was 294.35 on 10/28/2020), hemoglobin 8.8, ANC of 0.85, normal platelets.? He has not required transfusion and his hemoglobin has  remained stable since 10/28/2020.  ?  07/23/2021:  -Procrit?started 11/20/2020  -Hospitalized 06/24/2021-06/29/2021: Acute CHF exacerbation; acute hypercapnic respiratory failure secondary to obesity hypoventilation syndrome, etc.; required BiPAP at night for obesity hypoventilation syndrome/ROVERTO/chronic hypercapnic respiratory failure; positive nuclear stress test with small to medium area of moderate to mild apical ischemia 06/28/2021; inpatient C did not reveal coronary artery disease  -07/13/2021: Flow cytometry of blood (leukemia lymphoma panel: Atypical myeloid blasts accounting for 43% of viable leukocytes, favor acute myeloid leukemia; a small population of monoclonal B cells, representing 0.2% of viable leukocytes  -07/23/2021: CBC reviewed; WBC 13.2; hemoglobin 7.1; platelets 160K anc2.74; blasts 36%  Presents for follow up.? States that he has started eating healthier and he feels a lot better than before.? Says that he stopped drinking alcohol.? He is extremely disappointed that now, after all his efforts at leading a healthy lifestyle, he has developed acute myeloid leukemia.? Denies any weakness or fatigue.? Denies recurrent fevers, chills, infections, unusual bleeding or bruising.? No unusual headaches or focal neurological symptoms.? No skin rash.? No lumps or lymphadenopathy.? We will refer him to [facility] for consideration of chemotherapy.? We will stop Procrit.? Will transfuse as needed.  ?  ?  Review of systems:  All systems reviewed, and found to be negative except for the symptoms detailed above.  ?  ?  Physical examination:  VITAL SIGNS:? Reviewed.? ?  GENERAL:? In no apparent distress.?  HEAD:? No signs of head trauma.  EYES:? Pupils are equal.? Extraocular motions intact.?  EARS:? Hearing grossly intact.  MOUTH:? Oropharynx is normal.  NECK:? No adenopathy, no JVD.? ?  CHEST:? Chest with clear breath sounds bilaterally.? No wheezes, rales, or rhonchi.?  CARDIAC:? Regular rate and  rhythm.? S1 and S2, without murmurs, gallops, or rubs.  VASCULAR:? No Edema.? Peripheral pulses normal and equal in all extremities.  ABDOMEN:? Soft, without detectable tenderness.? No sign of distention.? No? ?rebound or guarding, and no masses palpated.? ?Bowel Sounds normal.  MUSCULOSKELETAL:? Good range of motion of all major joints. Extremities without clubbing, cyanosis or edema.?  NEUROLOGIC EXAM:? Alert and oriented x 3.? No focal sensory or strength deficits.? ?Speech normal.? Follows commands.  PSYCHIATRIC:? Mood normal.  SKIN:? No rash or lesions.  10/28/2020: In no acute discomfort. ?Morbidly obese. ?In a wheelchair.? Complains of pain in?both feet?and left wrist.? Also, all joints hurt.? Lungs clear to auscultation. ?Heart sounds normal.? I could not palpate liver, spleen, or any other intra-abdominal organs or masses because of?morbid obesity.  11/13/2020: Not examined; it was a telemedicine visit, audio only.  12/15/2020: General condition dramatically improved.? He is happy.? Pink color to face.  07/23/2021:?Looks well and healthy.? Obese, but appears?much healthier than before.? Lungs clear. ?Heart sounds normal.  ?  ?  Assessment:  #Primary myelofibrosis, diagnosed on bone marrow biopsy (01/08/2014).? JAK2 mutation negative.? Extensive osteosclerosis and fibrosis.??Bone marrow?cellularity 10%.? Flow cytometry with <3% bone marrow blasts.? Normal immunophenotype.  -05/27/2017: Bone marrow biopsy: Markedly hypocellular, 10%; myelofibrosis; bone marrow predominantly fibrotic; a small population of monoclonal CD5 negative, CD10 negative B cells, representing approximately 4% of lymphocytes  -Noncompliant with follow-up  -Not a candidate for allogenic stem cell transplant because of comorbidities and noncompliance  -PRBC x8 between 02/19/2015-12/12/2017  -10/28/2020: Erythropoietin level?370.4  -EGD (11/04/2020) during hospitalization 10/28/2020-11/04/2020 ([facility]), negative; stool  for occult blood was positive x3; outpatient colonoscopy was recommended  -Feraheme 510 mg IV x2 (11/20/2020, 11/27/2020)?(relative iron deficiency)  -Procrit 40,000 subcutaneously weekly, started 11/20/2020  -Hospitalized 06/24/2021-06/29/2021: Acute CHF exacerbation; acute hypercapnic respiratory failure secondary to obesity hypoventilation syndrome, etc.; required BiPAP at night for obesity hypoventilation syndrome/ROVERTO/chronic hypercapnic respiratory failure; positive nuclear stress test with small to medium area of moderate to mild apical ischemia 06/28/2021; inpatient C did not reveal coronary artery disease  -07/13/2021: Flow cytometry of blood (leukemia lymphoma panel: Atypical myeloid blasts accounting for 43% of viable leukocytes, favor acute myeloid leukemia; a small population of monoclonal B cells, representing 0.2% of viable leukocytes  >>?Now, has transformed into acute myeloid leukemia  ?  ?  #Chronic anemia, hemoglobin ~8.5 g/dL  -Normocytic  -PRBC x8 (02/19/2015-12/12/2017)  -PRBC x4 units?(10/28/2020-11/04/2020; hemoglobin 6.9)  -EGD (11/04/2020) during hospitalization 10/28/2020-11/04/2020 ([facility]), negative; stool for occult blood was positive x3; outpatient colonoscopy was recommended  -Feraheme 510 mg IV x2 (11/20/2020, 11/27/2020)  -Procrit 40,000 subcutaneously weekly, started 11/20/2020  ?  ?  #Iron deficiency:  -10/28/2020:?Serum iron 8, low. ?TIBC 192, low.? Transferrin saturation 4%, low.? Ferritin 294.35, elevated?(relative iron deficiency)  -Screening colonoscopy 04/27/2015, was normal  -Patient is status post gastric bypass?in 2000  -EGD (11/04/2020) during hospitalization 10/28/2020-11/04/2020 ([facility]), negative; stool for occult blood was positive x3; outpatient colonoscopy was recommended  -Colonoscopy being planned by [facility]  -Feraheme 510 mg IV x2 (11/20/2020, 11/27/2020)  ?  ?  #Leukopenia and neutropenia  -WBC 2.3.? ANC 0.77?(secondary  to myelofibrosis)  -Platelet count normal  ?  #History of hepatosplenomegaly secondary to myelofibrosis  -CT A/P (01/20/2016): No megalies with 3.5 cm nodule; retroperitoneal fatty mass adjacent to IVC without change since 03/12/2020; bony sclerosis, consistent with myelofibrosis  -CT A/P (03/19/2020): Hepatosplenomegaly, unchanged since 01/12/2016  ?  ?  #Vitamin B12 deficiency  -B12 level 199 (09/01/2020)  -Iron stores normal (09/01/2020)  -10/28/2020:?Says that he has been taking?vitamin B12 orally, both tubal as well as pills,?14-12 years?after gastric bypass surgery  -10/28/2020: B12 level 1773, elevated?(absorbing satisfactorily via oral route).? Intrinsic factor antibody negative.  ?  ?  #Morbidly obese, status post gastric bypass surgery?(2000):  -10/28/2020: Weight 322.97 LBS; height 175 cm; BMI 47.84  ?  ?  #Atrial fibrillation;?on anticoagulation with Xarelto  ?  #Alcohol abuse.?  #Noncompliant with follow-ups.  #History of epistaxis, requiring cauterization in the past  #History of gastric bypass?in 2000  #History of back surgery?in [place]?in 2016 or 2017  ?  ?  Plan:  Now, has transformed into acute myeloid leukemia  Multiple?comorbidities  High-dose chemotherapy?with stem cell rescue, face?out of question  Very poor prognosis  Will refer to BMT service, [place], for palliative?chemotherapy, if feasible  Stop Procrit  Transfused?as needed?if hemoglobin drops to <7 g/dL?or if he becomes symptomatic  (All blood units?leukopoor and irradiated)  ?  Supportive care?with periodic transfusions?(for symptomatic anemia?or if hemoglobin <7 g/dL)  Erythropoietin level <500;?Procrit 40,000 units subcutaneously weekly, started?11/20/2020  Not a candidate for allogenic stem cell transplant because of comorbidities.  ?  Chronically anemic.  PRBC x8 (02/19/2015-12/12/2017)  PRBC x5 (10/28/2020-11/04/2020)  Usual?hemoglobin ~8.5 g/dL  ?  -Relative iron deficiency (labs: 10/28/2020)  -Feraheme?510 mg IV x2  (11/20/2020, 11/27/2020)  -Recheck iron stores 6 weeks post completion of Feraheme  -FIT?test?negative (12/18/2020)  -Screening colonoscopy 04/27/2015, was normal  -Patient is status post gastric bypass?in 2000  -EGD (11/04/2020): Negative?([facility],?hospitalized?10/28/2020-11/04/2020, symptomatic anemia, heme positive stool)  -Colonoscopy being planned by?[facility]  ?  B12 deficiency was noted on 09/01/2020.  Intrinsic factor antibody negative  Apparently, has been?taking vitamin B12?orally,?chewable as well as pills, for last 10-12 years?after gastric bypass surgery.  -Absorbing satisfactorily via oral route?(labs:?10/28/2020; B12 level?1773)  ?  Check CBC every week?prior to each?shot of Procrit  ?  Follow-up in 1 month  ?  Above discussed at length with him. ?All questions answered.  Discussed labs and given copies of relevant reports.  Discussed with him that prognosis is guarded.? He was disappointed.  He understands and agrees with this plan.  Physical Exam  Vitals & Measurements  T:?36.9? ?C (Oral)? HR:?82(Peripheral)? RR:?20? BP:?117/70?  HT:?176.00?cm? WT:?138.500?kg? BMI:?44.71?   Problem List/Past Medical History  Ongoing  Acute urinary retention  Anemia  Atrial fibrillation  BPH with urinary obstruction  Coagulopathy  Congestive heart failure  HTN (hypertension)  Hyponatremia  Leg pain  Liver function failure  LV dysfunction  Morbid obesity(  Probable Diagnosis  )  Myelofibrosis  Myofibrosis  Primary myelofibrosis  Primary myelofibrosis  Historical  Apnea, sleep  Back pain  Cataract  CHF - Congestive heart failure  Hypertension  Nose bleed  Procedure/Surgical History  Fluoroscopy of Multiple Coronary Arteries using Low Osmolar Contrast (06/28/2021)  Fluoroscopy of Thoracic Aorta using Low Osmolar Contrast (06/28/2021)  Measurement of Cardiac Sampling and Pressure, Left Heart, Percutaneous Approach (06/28/2021)  Transfusion of Nonautologous Red Blood Cells into Peripheral Vein,  Percutaneous Approach (12/11/2017)  BONE MARROW ASPIRATION PERFORMED WITH BONE MARROW BIOPSY THROUGH THE SAME INCISION ON THE SAME DATE OF SERVICE (05/25/2017)  Bone marrow; biopsy, needle or trocar (05/25/2017)  Drainage of Bone Marrow, Percutaneous Approach, Diagnostic (05/25/2017)  Extraction of Iliac Bone Marrow, Percutaneous Approach, Diagnostic (05/25/2017)  Drainage of Right Upper Extremity, Percutaneous Approach (10/03/2016)  Drainage of Right Lower Arm Skin, External Approach (10/01/2016)  Incision and drainage of abscess (eg, carbuncle, suppurative hidradenitis, cutaneous or subcutaneous abscess, cyst, furuncle, or paronychia); simple or single (10/01/2016)  Drainage of Bladder with Drainage Device, Via Natural or Artificial Opening (09/25/2016)  Insertion of temporary indwelling bladder catheter; simple (eg, Gordon) (09/25/2016)  Control nasal hemorrhage, anterior, simple (limited cautery and/or packing) any method (02/05/2016)  Packing of Nasal Region using Packing Material (02/05/2016)  Cataract Extraction Phacoemulsification (Right) (08/20/2015)  Extracapsular cataract removal with insertion of intraocular lens prosthesis (1 stage procedure), manual or mechanical technique (eg, irrigation and aspiration or phacoemulsification) (08/20/2015)  Insertion of intraocular lens prosthesis at time of cataract extraction, one-stage (08/20/2015)  Phacoemulsification and aspiration of cataract (08/20/2015)  Cataract Extraction Phacoemulsification (Left) (06/09/2015)  Extracapsular cataract removal with insertion of intraocular lens prosthesis (1 stage procedure), manual or mechanical technique (eg, irrigation and aspiration or phacoemulsification) (06/09/2015)  Insertion of intraocular lens prosthesis at time of cataract extraction, one-stage (06/09/2015)  Phacoemulsification and aspiration of cataract (06/09/2015)  Colonoscopy (04/27/2015)  Colonoscopy (04/27/2015)  Colonoscopy, flexible; diagnostic, including  collection of specimen(s) by brushing or washing, when performed (separate procedure) (04/27/2015)  Transfusion of packed cells (02/23/2015)  Transfusion of packed cells (02/20/2015)  Biopsy Bone Marrow Aspiration (.) (01/08/2014)  Biopsy of bone marrow (01/08/2014)  gastric bypass  hernia repair   Medications  albuterol-ipratropium 100 mcg-20 mcg/inh inhalation aerosol, 1 puff(s), INH, QID, 5 refills,? ?Unable to obtain: PT STATED Last Dose Date/Time Unknown  allopurinol 100 mg oral tablet, 100 mg= 1 tab(s), Oral, Daily, 4 refills  amlodipine 5 mg oral tablet, 5 mg= 1 tab(s), Oral, Daily, 3 refills  aspirin 325 mg oral Delayed Release (EC) tablet, 325 mg= 1 tab(s), Oral, Daily, 6 refills  bisacodyl 5 mg ORAL enteric coated tablet, 30 mg= 6 tab(s), Oral, Once  bumetanide 1 mg oral tablet, 1 mg= 1 tab(s), Oral, Daily, 1 refills  calcium carbonate 600 mg oral tablet, 600 mg= 1 tab(s), Oral, Daily  cyanocobalamin 1000 mcg oral tablet, 1000 mcg= 1 tab(s), Oral, Daily, 5 refills  Flomax 0.4 mg oral capsule, 0.4 mg= 1 cap(s), Oral, Daily, 4 refills  furosemide 40 mg oral tablet, 40 mg= 1 tab(s), Oral, BID, 3 refills  magnesium citrate oral liquid, 1 bottle(s)= 300 mL, Oral, Once  magnesium oxide 400 mg oral tablet, 400 mg= 1 tab(s), Oral, Daily  metoprolol tartrate 100 mg oral tablet, 100 mg= 1 tab(s), Oral, BID, 4 refills  Miralax - for colonoscopy prep, 238 gm= 14 packet(s), Oral, Once  Multi-Day Plus Minerals oral tablet, 1 tab(s), Oral, Daily  Nebulizer Machine, See Instructions,? ?Unable to obtain: PT STATED Last Dose Date/Time Unknown  Nebulizer Tubing Kit, See Instructions, 5 refills,? ?Unable to obtain: PT STATED Last Dose Date/Time Unknown  Retacrit 4000 units/mL preservative-free injectable solution, See Instructions  Solumedrol IV push / IM, 125 mg= 2 mL, IV Push, Once  Toradol 30 mg for IV Push, 30 mg= 1 mL, IV Push, Once  Vitamin D2 2000 intl units oral capsule, 2000 IntUnit= 1 cap(s), Oral, Daily, 3  refills  Allergies  vancomycin  Social History  Abuse/Neglect  No, No, Yes, 07/16/2021  Alcohol - High Risk, 12/04/2014  Past, 07/12/2021  Employment/School  Work/School description: SSI Disability., 07/12/2021  Exercise - Does not exercise, 03/06/2015  Self assessment: Fair condition., 12/08/2016  Home/Environment  Lives with Spouse. Living situation: Home/Independent. Alcohol abuse in household: No. Substance abuse in household: No. Smoker in household: No. Injuries/Abuse/Neglect in household: No. Feels unsafe at home: No. Safe place to go: Yes. Family/Friends available for support: Yes. Concern for family members at home: No. Major illness in household: No. Financial concerns: No. TV/Computer concerns: No., 12/08/2016  Nutrition/Health  Low sodium, Good, 07/12/2021  Other  Sexual  Gender Identity Identifies as male., 03/01/2021  Sexually active: Yes., 03/06/2015  Substance Use - Denies Substance Abuse, 12/04/2014  Never, 07/12/2021  Tobacco - Denies Tobacco Use, 12/04/2014  Never (less than 100 in lifetime), N/A, 07/16/2021  Family History  Alcohol user: Father.  Cataract.: Father.  Hyperlipidemia.: Brother.  Hypertension.: Father.  Tobacco user: Father.  Immunizations  Vaccine Date Status Comments   COVID-19 MRNA, LNP-S, PF- Pfizer 04/30/2021 Recorded    influenza virus vaccine, inactivated 09/26/2020 Given    influenza virus vaccine, inactivated 10/08/2019 Recorded    influenza virus vaccine, inactivated 10/05/2018 Given tolerated well   influenza virus vaccine, inactivated 12/13/2017 Given Nursing Judgment   influenza virus vaccine, inactivated 10/05/2017 Recorded    influenza virus vaccine, inactivated 09/28/2016 Given    influenza virus vaccine, inactivated 11/17/2015 Given    influenza virus vaccine, inactivated 02/25/2015 Given Med Not Available   pneumococcal 23-polyvalent vaccine 01/05/2014 Given    influenza virus vaccine, inactivated 01/05/2014 Given

## 2022-05-03 NOTE — HISTORICAL [facility]
This is a historical note converted from [facility]. Formatting and pictures may have been removed.  Please reference Ashanti for original formatting and attached multimedia. Chief Complaint  retacrit  History of Present Illness  Problem List:  Primary Myelofibrosis  -Diagnosed 01/08/2014 via BMB  -JAK2 negative  ?   Current Treatment:  -PRN transfusions for Hgb <7 or symptomatic anemia  ?   -Procrit 40,000 units SubQ weekly  -Started: 11/20/2020  ?   Dose due: 3/3/2021  ?   Treatment History:  N/A  ?   HPI/Clinical History:  60-year-old gentleman with history of myelofibrosis, last seen in hematology clinic on 06/08/2017, subsequently lost to follow-up, now referred back to hematology for follow-up by internal medicine.  For full HPI please refer to MD last note dated 12/15/2020. Also refer to assessment and plan section.  ?   Interval History  3/3/2021: Patient presents for follow up on weekly Procrit; he is scheduled to receive a dose today. He was recently admitted for cellulitis; he finishes his oral antibiotics and steroids tomorrow. He states his BP meds were adjusted to prevent hyperkalemia. Today, K is 4.5. He also states they changed his diuretic; he complains that his mouth is always dry. Advised him to suck on peppermints as he was instructed to monitor his water intake. He complains that he is more short of breath than usual; he gets winded with ambulation. Will check iron stores as he stopped taking oral iron months ago when he completed the prescription.?Ca low at 7.9; remaining electrolytes WNL. LFTs WNL. WBC 6.1; H&H 9.0 & 30.3; plts 319k; ANC 3130. He is concerned that his H&H are never normal. Advised him that, because of his blood disorder, his H&H will likely never be WNL but they are better now than they have been. Will continue weekly CBC and have him follow up in 3 weeks. He is amenable to this plan.  Review of Systems  A complete 12-point?ROS was performed in full with pertinent positives as  described in interval history. Remainder of ROS done in full and are negative.  Physical Exam  Vitals & Measurements  T:?37.0? ?C (Oral)? HR:?101(Peripheral)? RR:?20? BP:?142/76?  HT:?175.00?cm? WT:?143.870?kg? BMI:?46.98?  General: ?Alert and oriented, No acute distress.??  Appearance: Well-groomed; obese.  HEENT: ?Normocephalic, Oral mucosa is moist.?Pupils are equal, round and reactive to light, Extraocular movements are intact, Normal conjunctiva.?  Neck: ?Supple, Non-tender, No lymphadenopathy, No thyromegaly.??  Respiratory: ?Lungs are clear to auscultation, Respirations are non-labored, Breath sounds are equal, Symmetrical chest wall expansion.??  Cardiovascular: ?Normal rate, Regular rhythm, No edema.??  Breast:??Breast exam not performed on todays visit.??  Gastrointestinal: Rounded,?Soft, Non-tender, Non-distended, Normal bowel sounds.??  Musculoskeletal: ?Normal strength.??Left wrist brace.  Integumentary: ?Warm, dry, intact.??Right arm bruising.  Neurologic: ?Alert, Oriented, No focal deficits, Cranial Nerves II-XII are grossly intact.??  Cognition and Speech: ?Oriented, Speech clear and coherent.?Wearing face mask.?  Psychiatric: ?Cooperative, Appropriate mood & affect. ?  ECOG Performance Scale: 2 - Capable of all self-care but unable to carry out any work activities. Up and about greater than 50 percent of waking hours.?  Assessment/Plan  1.?Primary myelofibrosis?D47.1  # Primary myelofibrosis, diagnosed on bone marrow biopsy (01/08/2014).? JAK2 mutation negative.? Extensive osteosclerosis and fibrosis.? Cellularity 10%.? Flow cytometry with <3% bone marrow blasts.? Normal immunophenotype.  -05/27/2017: Bone marrow biopsy: Markedly hypocellular, 10%; myelofibrosis; bone marrow predominantly fibrotic; a small population of monoclonal CD5 negative, CD10 negative B cells, representing approximately 4% of lymphocytes  -Noncompliant with follow-up  -Not a candidate for allogenic stem cell transplant  because of comorbidities and noncompliance  -PRBC x8 between 02/19/2015-12/12/2017  -10/28/2020: Erythropoietin level?370.4  -EGD (11/04/2020) during hospitalization 10/28/2020-11/04/2020 ([facility]), negative; stool for occult blood was positive x3; outpatient colonoscopy was recommended  -Feraheme 510 mg IV x2 (11/20/2020, 11/27/2020)?(relative iron deficiency)  -Procrit 40,000 subcutaneously weekly, started 11/20/2020  ?   # ??Chronic anemia, hemoglobin ~8.5 g/dL  -Normocytic  -PRBC x8 (02/19/2015-12/12/2017)  -PRBC x4 units?(10/28/2020-11/04/2020; hemoglobin 6.9)  -EGD (11/04/2020) during hospitalization 10/28/2020-11/04/2020 ([facility]), negative; stool for occult blood was positive x3; outpatient colonoscopy was recommended  -Feraheme 510 mg IV x2 (11/20/2020, 11/27/2020)  -Procrit 40,000 subcutaneously weekly, started 11/20/2020  ?   # ??Iron deficiency:  -10/28/2020:?Serum iron 8, low. ?TIBC 192, low.? Transferrin saturation 4%, low.? Ferritin 294.35, elevated?(relative iron deficiency)  -Screening colonoscopy 04/27/2015, was normal  -Patient is status post gastric bypass?in 2000  -EGD (11/04/2020) during hospitalization 10/28/2020-11/04/2020 ([facility]), negative; stool for occult blood was positive x3; outpatient colonoscopy was recommended  -Colonoscopy being planned by [facility]  -Feraheme 510 mg IV x2 (11/20/2020, 11/27/2020)  ?   # ??Leukopenia and neutropenia  -WBC 2.3.? ANC 0.77?(secondary to myelofibrosis)  -Platelet count normal  ?   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   # ??Vitamin B12 deficiency  -B12 level 199 (09/01/2020)  -Iron stores normal (09/01/2020)  -10/28/2020:?Says  that he has been taking?vitamin B12 orally, both tubal as well as pills,?14-12 years?after gastric bypass surgery  -10/28/2020: B12 level 1773, elevated?(absorbing satisfactorily via oral route).? Intrinsic factor antibody negative.  ?   # ??Morbidly obese, status post gastric bypass surgery?(2000):  -10/28/2020: Weight 322.97 LBS; height 175 cm; BMI 47.84  ?   # ??Atrial fibrillation;?on anticoagulation with Xarelto  ?  #Alcohol abuse.?  #Noncompliant with follow-ups.  #History of epistaxis, requiring cauterization in the past  #History of gastric bypass?in 2000  #History of back surgery?in [place]?in 2016 or 2017  ?  Plan:  Supportive care?with periodic transfusions?(for symptomatic anemia?or if hemoglobin <7 g/dL)  Erythropoietin level <500  Procrit 40,000 units subcutaneously weekly, started?11/20/2020  Not a candidate for allogenic stem cell transplant because of comorbidities.  ?  Chronically anemic.  PRBC x8 (02/19/2015-12/12/2017)  PRBC x5 (10/28/2020-11/04/2020)  Usual?hemoglobin ~8.5 g/dL  ?  -Relative iron deficiency (labs: 10/28/2020)  -Feraheme?510 mg IV x2 (11/20/2020, 11/27/2020)  -Recheck iron stores 6 weeks post completion of Feraheme  -FIT?test  -Screening colonoscopy 04/27/2015, was normal  -Patient is status post gastric bypass?in 2000  -EGD (11/04/2020): Negative?([facility],?hospitalized?10/28/2020-11/04/2020, symptomatic anemia, heme positive stool)  -Colonoscopy being planned by?[facility]  ?  B12 deficiency was noted on 09/01/2020.  Intrinsic factor antibody negative  Apparently, has been?taking vitamin B12?orally,?chewable as well as pills, for last 10-12 years?after gastric bypass surgery.  -Absorbing satisfactorily via oral route?(labs:?10/28/2020; B12 level?1773)  ?  Check CBC every week?prior to each start of Procrit  ?  Check iron studies today.  Continue supportive care measures;?Hgb 9.0; no transfusion indicated.  Continue Procrit weekly & weekly CBC  monitoring.  Follow up in?3 weeks (F2F on 3/24/2021) with CBC, CMP.  Ordered:  ?   Problem List/Past Medical History  Ongoing  Acute urinary retention  Anemia  BPH with urinary obstruction  Coagulopathy  Congestive heart failure  HTN (hypertension)  Hyponatremia  Leg pain  Liver function failure  LV dysfunction  Morbid obesity(  Probable Diagnosis  )  Myelofibrosis  Myofibrosis  Primary myelofibrosis  Primary myelofibrosis  Historical  Apnea, sleep  Back pain  Cataract  CHF - Congestive heart failure  Hypertension  Nose bleed  Procedure/Surgical History  Transfusion of Nonautologous Red Blood Cells into Peripheral Vein, Percutaneous Approach (12/11/2017)  BONE MARROW ASPIRATION PERFORMED WITH BONE MARROW BIOPSY THROUGH THE SAME INCISION ON THE SAME DATE OF SERVICE (05/25/2017)  Bone marrow; biopsy, needle or trocar (05/25/2017)  Drainage of Bone Marrow, Percutaneous Approach, Diagnostic (05/25/2017)  Extraction of Iliac Bone Marrow, Percutaneous Approach, Diagnostic (05/25/2017)  Drainage of Right Upper Extremity, Percutaneous Approach (10/03/2016)  Drainage of Right Lower Arm Skin, External Approach (10/01/2016)  Incision and drainage of abscess (eg, carbuncle, suppurative hidradenitis, cutaneous or subcutaneous abscess, cyst, furuncle, or paronychia); simple or single (10/01/2016)  Drainage of Bladder with Drainage Device, Via Natural or Artificial Opening (09/25/2016)  Insertion of temporary indwelling bladder catheter; simple (eg, Gordon) (09/25/2016)  Control nasal hemorrhage, anterior, simple (limited cautery and/or packing) any method (02/05/2016)  Packing of Nasal Region using Packing Material (02/05/2016)  Cataract Extraction Phacoemulsification (Right) (08/20/2015)  Extracapsular cataract removal with insertion of intraocular lens prosthesis (1 stage procedure), manual or mechanical technique (eg, irrigation and aspiration or phacoemulsification) (08/20/2015)  Insertion of intraocular lens prosthesis at  time of cataract extraction, one-stage (08/20/2015)  Phacoemulsification and aspiration of cataract (08/20/2015)  Cataract Extraction Phacoemulsification (Left) (06/09/2015)  Extracapsular cataract removal with insertion of intraocular lens prosthesis (1 stage procedure), manual or mechanical technique (eg, irrigation and aspiration or phacoemulsification) (06/09/2015)  Insertion of intraocular lens prosthesis at time of cataract extraction, one-stage (06/09/2015)  Phacoemulsification and aspiration of cataract (06/09/2015)  Colonoscopy (04/27/2015)  Colonoscopy (04/27/2015)  Colonoscopy, flexible; diagnostic, including collection of specimen(s) by brushing or washing, when performed (separate procedure) (04/27/2015)  Transfusion of packed cells (02/23/2015)  Transfusion of packed cells (02/20/2015)  Biopsy Bone Marrow Aspiration (.) (01/08/2014)  Biopsy of bone marrow (01/08/2014)  gastric bypass  hernia repair   Medications  allopurinol 100 mg oral tablet, 100 mg= 1 tab(s), Oral, Daily, 2 refills  aspirin 325 mg oral Delayed Release (EC) tablet, 325 mg= 1 tab(s), Oral, Daily, 6 refills  bisacodyl 5 mg ORAL enteric coated tablet, 30 mg= 6 tab(s), Oral, Once  cyanocobalamin 1000 mcg oral tablet, 1000 mcg= 1 tab(s), Oral, Daily, 5 refills  diltiazem 180 mg/24 hours oral CAPsule, extended release, 180 mg= 1 cap(s), Oral, Daily, 3 refills  ferrous sulfate 325 mg oral tablet, 325 mg= 1 tab(s), Oral, Every other day, 3 refills,? ?Not taking  Flomax 0.4 mg oral capsule, 0.4 mg= 1 cap(s), Oral, Daily, 3 refills  Lasix 40 mg oral tablet, 40 mg= 1 tab(s), Oral, Daily  magnesium citrate oral liquid, 1 bottle(s)= 300 mL, Oral, Once  magnesium oxide 400 mg oral tablet, 400 mg= 1 tab(s), Oral, Daily  metoprolol tartrate 100 mg oral tablet, 100 mg= 1 tab(s), Oral, BID, 3 refills  Miralax - for colonoscopy prep, 238 gm= 14 packet(s), Oral, Once  Multi-Day Plus Minerals oral tablet, 1 tab(s), Oral, Daily  Norvasc 5 mg oral tablet,  5 mg= 1 tab(s), Oral, Daily  Pantoprazole 40 mg ORAL EC-Tablet, 40 mg= 1 tab(s), Oral, BID  Procrit 2000 units/mL preservative-free injectable solution, 2000 units= 1 mL, Subcutaneous, 3x/Wk  Solumedrol IV push / IM, 125 mg= 2 mL, IV Push, Once  TNF Medl (Template NonFormulary), See Instructions,? ?Not Taking, Completed Rx  Toradol 30 mg for IV Push, 30 mg= 1 mL, IV Push, Once  Vitamin D2 2000 intl units oral capsule, 2000 IntUnit= 1 cap(s), Oral, Daily, 3 refills  Allergies  vancomycin  Social History  Abuse/Neglect  No, No, Yes, 03/03/2021  Alcohol - High Risk, 12/04/2014  Past, Wine, 03/03/2021  Employment/School  disability, Work/School description: disabled. Operates hazardous equipment: No., 12/08/2016  Exercise - Does not exercise, 03/06/2015  Self assessment: Fair condition., 12/08/2016  Home/Environment  Lives with Spouse. Living situation: Home/Independent. Alcohol abuse in household: No. Substance abuse in household: No. Smoker in household: No. Injuries/Abuse/Neglect in household: No. Feels unsafe at home: No. Safe place to go: Yes. Family/Friends available for support: Yes. Concern for family members at home: No. Major illness in household: No. Financial concerns: No. TV/Computer concerns: No., 12/08/2016  Nutrition/Health  Caffeine intake amount: NONE. Wants to lose weight: Yes. Sleeping concerns: Yes. Feels highly stressed: No., 11/17/2015  Regular, 03/06/2015  Other  Sexual  Gender Identity Identifies as male., 03/01/2021  Sexually active: Yes., 03/06/2015  Substance Use - Denies Substance Abuse, 12/04/2014  Never, 03/03/2021  Tobacco - Denies Tobacco Use, 12/04/2014  Never (less than 100 in lifetime), No, 03/03/2021  Family History  Alcohol user: Father.  Cataract.: Father.  Hyperlipidemia.: Brother.  Hypertension.: Father.  Tobacco user: Father.  Immunizations  Vaccine Date Status Comments   influenza virus vaccine, inactivated 09/26/2020 Given    influenza virus vaccine, inactivated 10/08/2019  Recorded    influenza virus vaccine, inactivated 10/05/2018 Given tolerated well   influenza virus vaccine, inactivated 12/13/2017 Given Nursing Judgment   influenza virus vaccine, inactivated 10/05/2017 Recorded    influenza virus vaccine, inactivated 09/28/2016 Given    influenza virus vaccine, inactivated 11/17/2015 Given    influenza virus vaccine, inactivated 02/25/2015 Given Med Not Available   pneumococcal 23-polyvalent vaccine 01/05/2014 Given    influenza virus vaccine, inactivated 01/05/2014 Given    Health Maintenance  Health Maintenance  ???Pending?(in the next year)  ??? ??OverDue  ??? ? ? ?Alcohol Misuse Screening due??01/02/21??and every 1??year(s)  ??? ??Due?  ??? ? ? ?Medicare Annual Wellness Exam due??03/03/21??and every 1??year(s)  ??? ? ? ?Tetanus Vaccine due??03/03/21??and every 10??year(s)  ??? ? ? ?Zoster Vaccine due??03/03/21??Unknown Frequency  ??? ??Due In Future?  ??? ? ? ?HF-LVEF not due until??11/04/21??and every 1??year(s)  ??? ? ? ?Obesity Screening not due until??01/01/22??and every 1??year(s)  ??? ? ? ?HF-Heart Failure Education not due until??02/24/22??and every 1??year(s)  ??? ? ? ?Aspirin Therapy for CVD Prevention not due until??02/24/22??and every 1??year(s)  ??? ? ? ?ADL Screening not due until??03/01/22??and every 1??year(s)  ???Satisfied?(in the past 1 year)  ??? ??Satisfied?  ??? ? ? ?ADL Screening on??03/01/21.??Satisfied by Caro Thompson RN  ??? ? ? ?Aspirin Therapy for CVD Prevention on??02/24/21.??Satisfied by Nneka Lofton LPN  ??? ? ? ?Blood Pressure Screening on??03/03/21.??Satisfied by Zhou, Lashari  ??? ? ? ?Body Mass Index Check on??03/03/21.??Satisfied by Radha BOWIE, Annamaria Graff  ??? ? ? ?Colorectal Screening on??12/18/20.??Satisfied by Radha Sosa  ??? ? ? ?Coronary Artery Disease Maintenance-Antiplatelet Agent Prescribed on??02/19/21.??Satisfied by Codey Ansari MD  ??? ? ? ?Depression Screening on??03/03/21.??Satisfied by Abelardo  Chelsea  ??? ? ? ?Diabetes Screening on??03/03/21.??Satisfied by Florian Castro, Rodolfo Matute  ??? ? ? ?Hypertension Management-Blood Pressure on??03/03/21.??Satisfied by Chelsea Zhou  ??? ? ? ?Influenza Vaccine on??01/05/21.??Satisfied by Jessi Castro  ??? ? ? ?Obesity Screening on??03/03/21.??Satisfied by Radha BOWIE, Annamaria Graff  ??? ??Refused?  ??? ? ? ?Influenza Vaccine on??12/04/20.??Recorded by Joann Healy  ?  Lab Results  Test Name Test Result Date/Time   Sodium Lvl 137 mmol/L 03/03/2021 13:15 CST   Potassium Lvl 4.5 mmol/L 03/03/2021 13:15 CST   Chloride 103 mmol/L 03/03/2021 13:15 CST   CO2 26 mmol/L 03/03/2021 13:15 CST   Calcium Lvl 7.9 mg/dL (Low) 03/03/2021 13:15 CST   Glucose Lvl 93 mg/dL 03/03/2021 13:15 CST   AST 26 unit/L 03/03/2021 13:15 CST   ALT 16 unit/L 03/03/2021 13:15 CST   Total Protein 6.2 gm/dL (Low) 03/03/2021 13:15 CST   Albumin Lvl 3.7 gm/dL 03/03/2021 13:15 CST   Globulin 2.5 gm/dL 03/03/2021 13:15 CST   A/G Ratio 1.5 ratio 03/03/2021 13:15 CST   WBC 6.1 x10(3)/mcL 03/03/2021 13:15 CST   RBC 3.36 x10(6)/mcL (Low) 03/03/2021 13:15 CST   Hgb 9.0 gm/dL (Low) 03/03/2021 13:15 CST   Hct 30.3 % (Low) 03/03/2021 13:15 CST   Platelet 319 x10(3)/mcL 03/03/2021 13:15 CST   MCV 90.2 fL 03/03/2021 13:15 CST   MCH 26.8 pg 03/03/2021 13:15 CST   MCHC 29.7 gm/dL (Low) 03/03/2021 13:15 CST   RDW 20.1 % (High) 03/03/2021 13:15 CST   MPV 9.3 fL 03/03/2021 13:15 CST   Abs Neut 3.13 x10(3)/mcL 03/03/2021 13:15 CST   Neut Man 59 % 03/03/2021 13:15 CST   Lymph Man 15.0 % (Low) 03/03/2021 13:15 CST   Monocyte Man 12 % (High) 03/03/2021 13:15 CST   Eos Man 1 % 03/03/2021 13:15 CST   Basophil Man 2 % (High) 03/03/2021 13:15 CST   F[place]th Man 11 % (High) 03/03/2021 13:15 CST   NRBC Man 16 % 03/03/2021 13:15 CST   Platelet Est Normal 03/03/2021 13:15 CST   Anisocyte 1+ 03/03/2021 13:15 CST   Microcyte 2+ 03/03/2021 13:15 CST   RBC Morph Abnormal 03/03/2021 13:15 CST

## 2022-05-03 NOTE — HISTORICAL [facility] CERNER
This is a historical note converted from [facility]. Formatting and pictures may have been removed.  Please reference Ashanti for original formatting and attached multimedia. History of Present Illness  Past medical history: Urinary retention.? Myelofibrosis.? Anemia.? BPH.? CHF.? Hypertension.? Hyponatremia.? Leg pain.? Abnormal liver function.? Epistaxis, requiring cauterization in the past.? Obesity.? Sleep apnea.? Cataract.? Hypertension.? Hernia repair.? Gastric bypass?for weight loss (2000).? Alcohol abuse.? Noncompliance with follow-ups.? Atrial fibrillation.? Morbidly obese.? Back surgery at [facility] [place],?in 2016 or 2017.  ?   Social history:?.? Lives by himself in [place], [place]. ?Has 3 sons.? Disabled.? Drank heavily all his life, up to 5-12 beers daily?for many years;?for last 1 year,?and occasional 6 pack of beer.? No tobacco or illicit drugs.  ?   Family history:?Negative for cancers or any blood disorders.  ?   Health maintenance:  -04/27/2015: Screening colonoscopy: Normal  ?   Reason for follow-up:  Myelofibrosis; transfusion dependent anemia  ?   History of present illness:  60-year-old gentleman with history of myelofibrosis, last seen in hematology clinic on 06/08/2017, subsequently lost to follow-up, now referred back to hematology for follow-up by internal medicine.  ?   History and?chronology reviewed:  -01/2014: Noted to have heterogeneous bone marrow changes in the ribs and thoracic spine on CT chest  -Bone marrow biopsy (01/08/2014): Extensive osteosclerosis and fibrosis, consistent with primary myelofibrosis.? Hematopoietic elements comprise only 10% of bone marrow.? JAK2 mutation.? Flow cytometry with <3% bone marrow blasts with no abnormal immunophenotype.? Normal male karyotype.?  -Lost to follow-up for some time  -02/19/2015: Symptomatic anemia.? PRBC x4 units.  -Alcohol abuse.? Abnormal LFTs.? Coagulopathy.? Bruising.?  -History of  epistaxis.? Required cauterization in the past.  -History of hypodensity in the spleen  -Repeat bone marrow exam 05/2017, showed 6% blasts  -History of fatty mass of right upper retroperitoneum, noted on CT in March 2015, resulting in mass-effect on suprarenal IVC  -Not a stem cell transplant candidate because of comorbidities  -Supportive care with transfusions as needed  -01/12/2016: CT A/P with contrast: Splenomegaly with 3.5 cm nodule; retroperitoneal fatty mass adjacent to IVC without change in size since 03/12/2015; bony sclerosis consistent with myelofibrosis  -05/27/2017: Bone marrow exam: Markedly hypocellular (10%) bone marrow with myelofibrosis (bone marrow is predominantly fibrotic with small clusters of hematopoietic cells composed primarily of red blood cell precursors and dysplastic appearing/partially crushed megakaryocytes); flow cytometry shows an increase in CD34 positive myeloblasts (CD33 negative), representing approximately 6% of total leukocytes, and a small population of monoclonal CD5 negative, CD10 negative B cells, representing approximately 4% of lymphocytes; normal male karyotype  -12/12/2017: Abdominal ultrasound, complete: Hepatic cirrhotic morphology and hepatomegaly (23.5 cm); splenomegaly (21.0 cm)  -03/19/2020: CT A/P with contrast (abdominal pain): Hepatosplenomegaly, unchanged since 01/12/2016 (liver 17.5 cm; spleen 22 cm)  -09/02/2020: Limited abdominal ultrasound: Hepatomegaly; coarse echotexture of liver parenchyma; in gallbladder, either sludge balls or non-shadowing stones  -03/11/2020: TTE: LVEF 60%  -09/14/2020: TTE: LVEF 25%  -09/19/2020: CT chest with contrast (shortness of breath, recurrent hyponatremia): No acute abnormality; diffuse heterogeneous sclerosis throughout the bones is unchanged  ?   Labs reviewed.  ?  -Chronic hyponatremia.? Sodium 129.  -LFTs normal  -B12 low, 199 (09/01/2020)  -Iron stores normal (09/01/2020)  -Chronic anemia.? Hemoglobin 8.5  (09/28/2020)  -MCV normal  -Leukopenia and neutropenia.? WBC 2.3.? ANC 0.77.  -Platelets normal.  -Abnormal differential count, consistent with myelofibrosis  ?  Transfusions:  12/12/2017: PRBC x2  02/23/2015: PRBC x2  02/20/2015: PRBC x2  02/19/2015: PRBC x2  ?  According to him,?7-8 PRBCs?because of gastric bypass surgery in 2000.  According to him,?7?PRBC units?in the course of back surgery?at [facility], 3-4 years back.  Says that he has been taking?oral vitamin B12, chewable?as well as pills, for last 10-12 years?after gastric bypass surgery.  ?  Interval history:  10/28/2020:  Presents for hematology consultation.? Morbidly obese. ?In a wheelchair.  Main complaint is?pain in feet, left wrist, right shoulder,?and joints, 10/10, for last 3 days, worse at night.  Chronic weakness and fatigue.? Exertional dyspnea.? Ambulates with the help of a walker.  No fevers, chills, or drenching night sweats.? No anorexia or unintentional weight loss.  He is wondering why he is?rapidly gaining weight.  No abdominal pain, nausea, vomiting, jaundice, fevers, chills,?hematemesis, melena, or hematochezia.  ?  11/13/2020:  -10/28/2020: Sodium 133.? Albumin 2.9.? , elevated.? Haptoglobin 251, normal.? Serum iron 8, low.? TIBC 192, low.? Transferrin saturation 4%, low.? Ferritin 294.35, elevated.? Folate normal.? B12 1773, elevated.? No M spike on SPEP.? Erythropoietin level 370.4.? Methylmalonic acid level 155, normal.? Intrinsic factor antibody negative.? Hemoglobin 6.8.? Platelets normal.? WBC 7.5.? Segmented neutrophils 62%.? Bands 17%.? Lymphocytes 6%.? Monocytes 10%.? Reticulocyte count 3.0%.? Quantitative immunoglobulins normal.? Serum DENNIS negative for monoclonal protein.  -Admitted to [facility], 10/28/2020-11/04/2020.? Transfused PRBC x5 units.? Admission hemoglobin 6.9.  Patient interviewed via telemedicine visit, audio only.? He was at his home in [place], [place].? Says  that?he was transfused with 5?units of packed RBCs?at [facility] when he was hospitalized?10/28/2020-11/04/2020 with severe anemia.? Did not have any bleeding.? Says that he was scoped under anesthesia?(he cannot tell whether it was an EGD or colonoscopy).? However, apparently,?scope test came back negative for?intestinal bleeding.? He did not have any overt bleeding whatsoever.? After transfusion,?felt almost?the same as he did before the transfusion.? No undue weakness, fatigue, dizziness, or dyspnea.? This is because of chronicity of the anemia, with physiologic compensation.  ?  12/15/2020:  -EGD (11/04/2020) during hospitalization 10/28/2020-11/04/2020 ([facility]), negative; stool for occult blood was positive x3; outpatient colonoscopy was recommended  -Colonoscopy being planned by [facility]  -Feraheme 510 mg IV x2 (11/20/2020, 11/27/2020)  -Procrit 40,000 subcutaneously weekly, started 11/20/2020  Presents for follow-up visit. ?Doing great.? Performance status markedly improved after intravenous iron infusion with Feraheme.? Able to walk more.? Breathing much better.? Very happy.? Does not think that he needs to be transfused today.? Tolerating Procrit shots?without any side effects. ?Labs on 12/11/2020, showed ferritin 883.64 (was 294.35 on 10/28/2020), hemoglobin 8.8, ANC of 0.85, normal platelets.? He has not required transfusion and his hemoglobin has remained stable since 10/28/2020.  ?  ?  Review of systems:  All systems reviewed, and found to be negative except for the symptoms detailed above.  ?  ?  Physical examination:  VITAL SIGNS:? Reviewed.? ?  GENERAL:? In no apparent distress.?  HEAD:? No signs of head trauma.  EYES:? Pupils are equal.? Extraocular motions intact.?  EARS:? Hearing grossly intact.  MOUTH:? Oropharynx is normal.  NECK:? No adenopathy, no JVD.? ?  CHEST:? Chest with clear breath sounds bilaterally.? No wheezes, rales, or rhonchi.?  CARDIAC:? Regular  rate and rhythm.? S1 and S2, without murmurs, gallops, or rubs.  VASCULAR:? No Edema.? Peripheral pulses normal and equal in all extremities.  ABDOMEN:? Soft, without detectable tenderness.? No sign of distention.? No? ?rebound or guarding, and no masses palpated.? ?Bowel Sounds normal.  MUSCULOSKELETAL:? Good range of motion of all major joints. Extremities without clubbing, cyanosis or edema.?  NEUROLOGIC EXAM:? Alert and oriented x 3.? No focal sensory or strength deficits.? ?Speech normal.? Follows commands.  PSYCHIATRIC:? Mood normal.  SKIN:? No rash or lesions.  10/28/2020: In no acute discomfort. ?Morbidly obese. ?In a wheelchair.? Complains of pain in?both feet?and left wrist.? Also, all joints hurt.? Lungs clear to auscultation. ?Heart sounds normal.? I could not palpate liver, spleen, or any other intra-abdominal organs or masses because of?morbid obesity.  11/13/2020: Not examined; it was a telemedicine visit, audio only.  12/15/2020: General condition dramatically improved.? He is happy.? Pink color to face.  ?  ?  Assessment:  #Primary myelofibrosis, diagnosed on bone marrow biopsy (01/08/2014).? JAK2 mutation negative.? Extensive osteosclerosis and fibrosis.? Cellularity 10%.? Flow cytometry with <3% bone marrow blasts.? Normal immunophenotype.  -05/27/2017: Bone marrow biopsy: Markedly hypocellular, 10%; myelofibrosis; bone marrow predominantly fibrotic; a small population of monoclonal CD5 negative, CD10 negative B cells, representing approximately 4% of lymphocytes  -Noncompliant with follow-up  -Not a candidate for allogenic stem cell transplant because of comorbidities and noncompliance  -PRBC x8 between 02/19/2015-12/12/2017  -10/28/2020: Erythropoietin level?370.4  -EGD (11/04/2020) during hospitalization 10/28/2020-11/04/2020 ([facility]), negative; stool for occult blood was positive x3; outpatient colonoscopy was recommended  -Feraheme 510 mg IV x2 (11/20/2020,  11/27/2020)?(relative iron deficiency)  -Procrit 40,000 subcutaneously weekly, started 11/20/2020  ?  #Chronic anemia, hemoglobin ~8.5 g/dL  -Normocytic  -PRBC x8 (02/19/2015-12/12/2017)  -PRBC x4 units?(10/28/2020-11/04/2020; hemoglobin 6.9)  -EGD (11/04/2020) during hospitalization 10/28/2020-11/04/2020 ([facility]), negative; stool for occult blood was positive x3; outpatient colonoscopy was recommended  -Feraheme 510 mg IV x2 (11/20/2020, 11/27/2020)  -Procrit 40,000 subcutaneously weekly, started 11/20/2020  ?  ?  #Iron deficiency:  -10/28/2020:?Serum iron 8, low. ?TIBC 192, low.? Transferrin saturation 4%, low.? Ferritin 294.35, elevated?(relative iron deficiency)  -Screening colonoscopy 04/27/2015, was normal  -Patient is status post gastric bypass?in 2000  -EGD (11/04/2020) during hospitalization 10/28/2020-11/04/2020 ([facility]), negative; stool for occult blood was positive x3; outpatient colonoscopy was recommended  -Colonoscopy being planned by [facility]  -Feraheme 510 mg IV x2 (11/20/2020, 11/27/2020)  ?  ?  #Leukopenia and neutropenia  -WBC 2.3.? ANC 0.77?(secondary to myelofibrosis)  -Platelet count normal  ?  #History of hepatosplenomegaly secondary to myelofibrosis  -CT A/P (01/20/2016): No megalies with 3.5 cm nodule; retroperitoneal fatty mass adjacent to IVC without change since 03/12/2020; bony sclerosis, consistent with myelofibrosis  -CT A/P (03/19/2020): Hepatosplenomegaly, unchanged since 01/12/2016  ?  ?  #Vitamin B12 deficiency  -B12 level 199 (09/01/2020)  -Iron stores normal (09/01/2020)  -10/28/2020:?Says that he has been taking?vitamin B12 orally, both tubal as well as pills,?14-12 years?after gastric bypass surgery  -10/28/2020: B12 level 1773, elevated?(absorbing satisfactorily via oral route).? Intrinsic factor antibody negative.  ?  #Morbidly obese, status post gastric bypass surgery?(2000):  -10/28/2020: Weight 322.97 LBS; height 175 cm; BMI  47.84  ?  #Atrial fibrillation;?on anticoagulation with Xarelto  ?  #Alcohol abuse.?  #Noncompliant with follow-ups.  #History of epistaxis, requiring cauterization in the past  #History of gastric bypass?in 2000  #History of back surgery?in [place]?in 2016 or 2017  ?  Plan:  Supportive care?with periodic transfusions?(for symptomatic anemia?or if hemoglobin <7 g/dL)  Erythropoietin level <500  Procrit 40,000 units subcutaneously weekly, started?11/20/2020  Not a candidate for allogenic stem cell transplant because of comorbidities.  ?  Chronically anemic.  PRBC x8 (02/19/2015-12/12/2017)  PRBC x5 (10/28/2020-11/04/2020)  Usual?hemoglobin ~8.5 g/dL  ?  -Relative iron deficiency (labs: 10/28/2020)  -Feraheme?510 mg IV x2 (11/20/2020, 11/27/2020)  -Recheck iron stores 6 weeks post completion of Feraheme  -FIT?test  -Screening colonoscopy 04/27/2015, was normal  -Patient is status post gastric bypass?in 2000  -EGD (11/04/2020): Negative?([facility],?hospitalized?10/28/2020-11/04/2020, symptomatic anemia, heme positive stool)  -Colonoscopy being planned by?[facility]  ?  B12 deficiency was noted on 09/01/2020.  Intrinsic factor antibody negative  Apparently, has been?taking vitamin B12?orally,?chewable as well as pills, for last 10-12 years?after gastric bypass surgery.  -Absorbing satisfactorily via oral route?(labs:?10/28/2020; B12 level?1773)  ?  Check CBC every week?prior to each start of Procrit  ?  Follow-up with NP in 2 weeks  ?  Above discussed at length with him. ?All questions answered.  He understands and agrees with this plan.  Physical Exam  Vitals & Measurements  T:?36.8? ?C (Oral)? HR:?77(Peripheral)? RR:?24? BP:?109/67? SpO2:?98%?  HT:?175?cm? WT:?141.4?kg? BMI:?46.17?   Problem List/Past Medical History  Ongoing  Acute urinary retention  Anemia  BPH with urinary obstruction  Coagulopathy  Congestive heart failure  HTN (hypertension)  Hyponatremia  Leg pain  Liver function failure  LV  dysfunction  Morbid obesity(  Probable Diagnosis  )  Myelofibrosis  Myofibrosis  Primary myelofibrosis  Historical  Apnea, sleep  Back pain  Cataract  CHF - Congestive heart failure  Hypertension  Nose bleed  Procedure/Surgical History  Transfusion of Nonautologous Red Blood Cells into Peripheral Vein, Percutaneous Approach (12/11/2017)  BONE MARROW ASPIRATION PERFORMED WITH BONE MARROW BIOPSY THROUGH THE SAME INCISION ON THE SAME DATE OF SERVICE (05/25/2017)  Bone marrow; biopsy, needle or trocar (05/25/2017)  Drainage of Bone Marrow, Percutaneous Approach, Diagnostic (05/25/2017)  Extraction of Iliac Bone Marrow, Percutaneous Approach, Diagnostic (05/25/2017)  Drainage of Right Upper Extremity, Percutaneous Approach (10/03/2016)  Drainage of Right Lower Arm Skin, External Approach (10/01/2016)  Incision and drainage of abscess (eg, carbuncle, suppurative hidradenitis, cutaneous or subcutaneous abscess, cyst, furuncle, or paronychia); simple or single (10/01/2016)  Drainage of Bladder with Drainage Device, Via Natural or Artificial Opening (09/25/2016)  Insertion of temporary indwelling bladder catheter; simple (eg, Gordon) (09/25/2016)  Control nasal hemorrhage, anterior, simple (limited cautery and/or packing) any method (02/05/2016)  Packing of Nasal Region using Packing Material (02/05/2016)  Cataract Extraction Phacoemulsification (Right) (08/20/2015)  Extracapsular cataract removal with insertion of intraocular lens prosthesis (1 stage procedure), manual or mechanical technique (eg, irrigation and aspiration or phacoemulsification) (08/20/2015)  Insertion of intraocular lens prosthesis at time of cataract extraction, one-stage (08/20/2015)  Phacoemulsification and aspiration of cataract (08/20/2015)  Cataract Extraction Phacoemulsification (Left) (06/09/2015)  Extracapsular cataract removal with insertion of intraocular lens prosthesis (1 stage procedure), manual or mechanical technique (eg, irrigation and  aspiration or phacoemulsification) (06/09/2015)  Insertion of intraocular lens prosthesis at time of cataract extraction, one-stage (06/09/2015)  Phacoemulsification and aspiration of cataract (06/09/2015)  Colonoscopy (04/27/2015)  Colonoscopy (04/27/2015)  Colonoscopy, flexible; diagnostic, including collection of specimen(s) by brushing or washing, when performed (separate procedure) (04/27/2015)  Transfusion of packed cells (02/23/2015)  Transfusion of packed cells (02/20/2015)  Biopsy Bone Marrow Aspiration (.) (01/08/2014)  Biopsy of bone marrow (01/08/2014)  gastric bypass  hernia repair   Medications  bisacodyl 5 mg ORAL enteric coated tablet, 30 mg= 6 tab(s), Oral, Once  bumetanide 2 mg oral tablet, 2 mg= 1 tab(s), Oral, BID, 1 refills,? ?Not Taking, Completed Rx  cephalexin 500 mg oral capsule, 500 mg= 1 cap(s), Oral, QID,? ?Not Taking, Completed Rx  clindamycin 300 mg oral capsule, 300 mg= 1 cap(s), Oral, q6hr,? ?Not Taking, Completed Rx  colchicine 0.6 mg oral capsule, 0.6 mg= 1 cap(s), Oral, BID,? ?Not taking  cyanocobalamin 1000 mcg oral tablet, 1000 mcg= 1 tab(s), Oral, Daily, 5 refills  diltiazem 180 mg/24 hours oral CAPsule, extended release, 180 mg= 1 cap(s), Oral, Daily  ferrous sulfate 325 mg oral tablet, 325 mg= 1 tab(s), Oral, Every other day, 3 refills  Flomax 0.4 mg oral capsule, 0.4 mg= 1 cap(s), Oral, Daily, 3 refills  furosemide 40 mg oral tablet, 40 mg= 1 tab(s), Oral, Daily,? ?Not taking  ibuprofen 800 mg oral tablet, 800 mg= 1 tab(s), Oral, TID,? ?Not taking  Lasix 80 mg oral tablet, 80 mg= 1 tab(s), Oral, BID  levofloxacin 750 mg oral tablet, 750 mg= 1 tab(s), Oral, Daily,? ?Not taking  lisinopril 5 mg oral tablet, 5 mg= 1 tab(s), Oral, Daily, 3 refills  magnesium aspartate 615 mg (61 mg magnesium) oral delayed release tablet, 615 mg= 1 tab(s), Oral, BID, 2 refills,? ?Investigating: Last Dose Date/Time Unknown  magnesium citrate oral liquid, 1 bottle(s)= 300 mL, Oral, Once  magnesium  gluconate 500 mg oral tablet, 500 mg= 1 tab(s), Oral, Daily  metoprolol tartrate 100 mg oral tablet, 100 mg= 1 tab(s), Oral, BID, 3 refills  Miralax - for colonoscopy prep, 238 gm= 14 packet(s), Oral, Once  Multi-Day Plus Minerals oral tablet, 1 tab(s), Oral, Daily  Pantoprazole 40 mg ORAL EC-Tablet, 40 mg= 1 tab(s), Oral, BID  Solumedrol IV push / IM, 125 mg= 2 mL, IV Push, Once  TNF Medl (Template NonFormulary), See Instructions  Toradol 30 mg for IV Push, 30 mg= 1 mL, IV Push, Once  Vitamin D2 2000 intl units oral capsule, 2000 IntUnit= 1 cap(s), Oral, Daily, 3 refills,? ?Not taking: too expensive- takes D3 chewable  Xarelto 20mg Tablet, 20 mg= 1 tab(s), Oral, qPM, 3 refills,? ?Not taking  Allergies  No Known Allergies  Social History  Abuse/Neglect  No, 12/11/2020  Alcohol - High Risk, 12/04/2014  Past, Beer, 11/13/2020  Employment/School  disability, Work/School description: disabled. Operates hazardous equipment: No., 12/08/2016  Exercise - Does not exercise, 03/06/2015  Self assessment: Fair condition., 12/08/2016  Home/Environment  Lives with Spouse. Living situation: Home/Independent. Alcohol abuse in household: No. Substance abuse in household: No. Smoker in household: No. Injuries/Abuse/Neglect in household: No. Feels unsafe at home: No. Safe place to go: Yes. Family/Friends available for support: Yes. Concern for family members at home: No. Major illness in household: No. Financial concerns: No. TV/Computer concerns: No., 12/08/2016  Nutrition/Health  Caffeine intake amount: NONE. Wants to lose weight: Yes. Sleeping concerns: Yes. Feels highly stressed: No., 11/17/2015  Regular, 03/06/2015  Other  Sexual  Sexually active: Yes., 03/06/2015  Substance Use - Denies Substance Abuse, 12/04/2014  Never, 11/13/2020  Tobacco - Denies Tobacco Use, 12/04/2014  Never (less than 100 in lifetime), N/A, 12/11/2020  Family History  Alcohol user: Father.  Cataract.: Father.  Hyperlipidemia.: Brother.  Hypertension.:  Father.  Tobacco user: Father.  Immunizations  Vaccine Date Status Comments   influenza virus vaccine, inactivated 09/26/2020 Given    influenza virus vaccine, inactivated 10/08/2019 Recorded    influenza virus vaccine, inactivated 10/05/2018 Given tolerated well   influenza virus vaccine, inactivated 12/13/2017 Given Nursing Judgment   influenza virus vaccine, inactivated 09/28/2016 Given    influenza virus vaccine, inactivated 11/17/2015 Given    influenza virus vaccine, inactivated 02/25/2015 Given Med Not Available   pneumococcal 23-polyvalent vaccine 01/05/2014 Given    influenza virus vaccine, inactivated 01/05/2014 Given

## 2022-05-03 NOTE — HISTORICAL [facility]ERNER
This is a historical note converted from [facility]. Formatting and pictures may have been removed.  Please reference [facility] for original formatting and attached multimedia. Chief Complaint  Myelofibrosis  History of Present Illness  Problem List:  Primary Myelofibrosis  -Diagnosed 01/08/2014 via BMB  -JAK2 negative  ?   Current Treatment:  -PRN transfusions for Hgb <7 or symptomatic anemia  ?   -Procrit 40,000 units SubQ weekly  -Started: 11/20/2020  ?   Dose due: 2/11/2021  ?   Treatment History:  N/A  ?   HPI/Clinical History:  60-year-old gentleman with history of myelofibrosis, last seen in hematology clinic on 06/08/2017, subsequently lost to follow-up, now referred back to hematology for follow-up by internal medicine.  For full HPI please refer to MD last note dated 12/15/2020. Also refer to assessment and plan section.  ?   Interval History  2/11/2021: Patient presents for follow up on weekly Procrit; he is scheduled to receive a dose today. VS stable. Na low at 133; potassium elevated at 5.4; Cl low at 92. CO2 elevated at 33. Remaining electrolytes WNL. BUN/creatinine/eGFR WNL. Bili & LFTs WNL. WBC 4.1; H&H 9.5 & 31.3; ptls 186k; ANC 2170. He drank the last banana fruit juice drink last night. Will order Kayexalate to be given in infusion today. Advised him not to resume drinking fruit juices with banana or eating bananas. Will recheck labs with Procrit next week and have him follow up in 2 weeks with labs & Procrit. He is amenable to this plan.  Review of Systems  A complete 12-point?ROS was performed in full with pertinent positives as described in interval history. Remainder of ROS done in full and are negative.  Physical Exam  Vitals & Measurements  T:?37.0? ?C (Oral)? HR:?87(Peripheral)? BP:?131/80? SpO2:?98%?  HT:?175.00?cm? WT:?142.900?kg? BMI:?46.66?  General: ?Alert and oriented, No acute distress.??  Appearance: Well-groomed; obese.  HEENT: ?Normocephalic, Oral mucosa is moist.?Pupils are equal,  round and reactive to light, Extraocular movements are intact, Normal conjunctiva.?  Neck: ?Supple, Non-tender, No lymphadenopathy, No thyromegaly.??  Respiratory: ?Lungs are clear to auscultation, Respirations are non-labored, Breath sounds are equal, Symmetrical chest wall expansion.??  Cardiovascular: ?Normal rate, Regular rhythm, No edema.??  Breast:??Breast exam not performed on todays visit.??  Gastrointestinal: Rounded,?Soft, Non-tender, Non-distended, Normal bowel sounds.??  Musculoskeletal: ?Normal strength.??  Integumentary: ?Warm, dry, intact.??  Neurologic: ?Alert, Oriented, No focal deficits, Cranial Nerves II-XII are grossly intact.??  Cognition and Speech: ?Oriented, Speech clear and coherent.??Wearing face mask.  Psychiatric: ?Cooperative, Appropriate mood & affect. ?  ECOG Performance Scale:?1 - Capable of light work.?  Assessment/Plan  1.?Primary myelofibrosis?D47.1  # Primary myelofibrosis, diagnosed on bone marrow biopsy (01/08/2014).? JAK2 mutation negative.? Extensive osteosclerosis and fibrosis.? Cellularity 10%.? Flow cytometry with <3% bone marrow blasts.? Normal immunophenotype.  -05/27/2017: Bone marrow biopsy: Markedly hypocellular, 10%; myelofibrosis; bone marrow predominantly fibrotic; a small population of monoclonal CD5 negative, CD10 negative B cells, representing approximately 4% of lymphocytes  -Noncompliant with follow-up  -Not a candidate for allogenic stem cell transplant because of comorbidities and noncompliance  -PRBC x8 between 02/19/2015-12/12/2017  -10/28/2020: Erythropoietin level?370.4  -EGD (11/04/2020) during hospitalization 10/28/2020-11/04/2020 ([facility]), negative; stool for occult blood was positive x3; outpatient colonoscopy was recommended  -Feraheme 510 mg IV x2 (11/20/2020, 11/27/2020)?(relative iron deficiency)  -Procrit 40,000 subcutaneously weekly, started 11/20/2020  ?   # ??Chronic anemia, hemoglobin ~8.5 g/dL  -Normocytic  -PRBC x8  (02/19/2015-12/12/2017)  -PRBC x4 units?(10/28/2020-11/04/2020; hemoglobin 6.9)  -EGD (11/04/2020) during hospitalization 10/28/2020-11/04/2020 ([facility]), negative; stool for occult blood was positive x3; outpatient colonoscopy was recommended  -Feraheme 510 mg IV x2 (11/20/2020, 11/27/2020)  -Procrit 40,000 subcutaneously weekly, started 11/20/2020  ?   # ??Iron deficiency:  -10/28/2020:?Serum iron 8, low. ?TIBC 192, low.? Transferrin saturation 4%, low.? Ferritin 294.35, elevated?(relative iron deficiency)  -Screening colonoscopy 04/27/2015, was normal  -Patient is status post gastric bypass?in 2000  -EGD (11/04/2020) during hospitalization 10/28/2020-11/04/2020 ([facility]), negative; stool for occult blood was positive x3; outpatient colonoscopy was recommended  -Colonoscopy being planned by [facility]  -Feraheme 510 mg IV x2 (11/20/2020, 11/27/2020)  ?   # ??Leukopenia and neutropenia  -WBC 2.3.? ANC 0.77?(secondary to myelofibrosis)  -Platelet count normal  ?   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   # ??Vitamin B12 deficiency  -B12 level 199 (09/01/2020)  -Iron stores normal (09/01/2020)  -10/28/2020:?Says that he has been taking?vitamin B12 orally, both tubal as well as pills,?14-12 years?after gastric bypass surgery  -10/28/2020: B12 level 1773, elevated?(absorbing satisfactorily via oral route).? Intrinsic factor antibody negative.  ?   # ??Morbidly obese, status post gastric bypass surgery?(2000):  -10/28/2020: Weight 322.97 LBS; height 175 cm; BMI 47.84  ?   # ??Atrial fibrillation;?on anticoagulation with Xarelto  ?  #Alcohol abuse.?  #Noncompliant with follow-ups.  #History of epistaxis, requiring  cauterization in the past  #History of gastric bypass?in 2000  #History of back surgery?in [place]?in 2016 or 2017  ?  Plan:  Supportive care?with periodic transfusions?(for symptomatic anemia?or if hemoglobin <7 g/dL)  Erythropoietin level <500  Procrit 40,000 units subcutaneously weekly, started?11/20/2020  Not a candidate for allogenic stem cell transplant because of comorbidities.  ?  Chronically anemic.  PRBC x8 (02/19/2015-12/12/2017)  PRBC x5 (10/28/2020-11/04/2020)  Usual?hemoglobin ~8.5 g/dL  ?  -Relative iron deficiency (labs: 10/28/2020)  -Feraheme?510 mg IV x2 (11/20/2020, 11/27/2020)  -Recheck iron stores 6 weeks post completion of Feraheme  -FIT?test  -Screening colonoscopy 04/27/2015, was normal  -Patient is status post gastric bypass?in 2000  -EGD (11/04/2020): Negative?([facility],?hospitalized?10/28/2020-11/04/2020, symptomatic anemia, heme positive stool)  -Colonoscopy being planned by?[facility]  ?  B12 deficiency was noted on 09/01/2020.  Intrinsic factor antibody negative  Apparently, has been?taking vitamin B12?orally,?chewable as well as pills, for last 10-12 years?after gastric bypass surgery.  -Absorbing satisfactorily via oral route?(labs:?10/28/2020; B12 level?1773)  ?  Check CBC every week?prior to each start of Procrit  ?  Continue supportive care measures;?Hgb 9.5; no transfusion indicated.  Continue Procrit weekly & weekly CBC monitoring.  Follow up in?2 weeks (F2F on 2/23/2021) with CBC, CMP.  Ordered:  ?   Problem List/Past Medical History  Ongoing  Acute urinary retention  Anemia  BPH with urinary obstruction  Coagulopathy  Congestive heart failure  HTN (hypertension)  Hyponatremia  Leg pain  Liver function failure  LV dysfunction  Morbid obesity(  Probable Diagnosis  )  Myelofibrosis  Myofibrosis  Primary myelofibrosis  Primary myelofibrosis  Historical  Apnea, sleep  Back pain  Cataract  CHF - Congestive heart failure  Hypertension  Nose  bleed  Procedure/Surgical History  Transfusion of Nonautologous Red Blood Cells into Peripheral Vein, Percutaneous Approach (12/11/2017)  BONE MARROW ASPIRATION PERFORMED WITH BONE MARROW BIOPSY THROUGH THE SAME INCISION ON THE SAME DATE OF SERVICE (05/25/2017)  Bone marrow; biopsy, needle or trocar (05/25/2017)  Drainage of Bone Marrow, Percutaneous Approach, Diagnostic (05/25/2017)  Extraction of Iliac Bone Marrow, Percutaneous Approach, Diagnostic (05/25/2017)  Drainage of Right Upper Extremity, Percutaneous Approach (10/03/2016)  Drainage of Right Lower Arm Skin, External Approach (10/01/2016)  Incision and drainage of abscess (eg, carbuncle, suppurative hidradenitis, cutaneous or subcutaneous abscess, cyst, furuncle, or paronychia); simple or single (10/01/2016)  Drainage of Bladder with Drainage Device, Via Natural or Artificial Opening (09/25/2016)  Insertion of temporary indwelling bladder catheter; simple (eg, Gordon) (09/25/2016)  Control nasal hemorrhage, anterior, simple (limited cautery and/or packing) any method (02/05/2016)  Packing of Nasal Region using Packing Material (02/05/2016)  Cataract Extraction Phacoemulsification (Right) (08/20/2015)  Extracapsular cataract removal with insertion of intraocular lens prosthesis (1 stage procedure), manual or mechanical technique (eg, irrigation and aspiration or phacoemulsification) (08/20/2015)  Insertion of intraocular lens prosthesis at time of cataract extraction, one-stage (08/20/2015)  Phacoemulsification and aspiration of cataract (08/20/2015)  Cataract Extraction Phacoemulsification (Left) (06/09/2015)  Extracapsular cataract removal with insertion of intraocular lens prosthesis (1 stage procedure), manual or mechanical technique (eg, irrigation and aspiration or phacoemulsification) (06/09/2015)  Insertion of intraocular lens prosthesis at time of cataract extraction, one-stage (06/09/2015)  Phacoemulsification and aspiration of cataract  (06/09/2015)  Colonoscopy (04/27/2015)  Colonoscopy (04/27/2015)  Colonoscopy, flexible; diagnostic, including collection of specimen(s) by brushing or washing, when performed (separate procedure) (04/27/2015)  Transfusion of packed cells (02/23/2015)  Transfusion of packed cells (02/20/2015)  Biopsy Bone Marrow Aspiration (.) (01/08/2014)  Biopsy of bone marrow (01/08/2014)  gastric bypass  hernia repair   Medications  bisacodyl 5 mg ORAL enteric coated tablet, 30 mg= 6 tab(s), Oral, Once  calcium (as carbonate) 500 mg oral tablet, 1 tablet, Oral, Daily  cyanocobalamin 1000 mcg oral tablet, 1000 mcg= 1 tab(s), Oral, Daily, 5 refills,? ?Not taking: duplicate  diltiazem 180 mg/24 hours oral CAPsule, extended release, 180 mg= 1 cap(s), Oral, Daily, 3 refills  ferrous sulfate 325 mg oral tablet, 325 mg= 1 tab(s), Oral, Every other day, 3 refills,? ?Not taking  Flomax 0.4 mg oral capsule, 0.4 mg= 1 cap(s), Oral, Daily, 3 refills  furosemide 40 mg oral tablet, 40 mg= 1 tab(s), Oral, BID, 3 refills  Kayexalate, 30 gm, Oral, Once  lisinopril 5 mg oral tablet, 5 mg= 1 tab(s), Oral, Daily, 3 refills  magnesium citrate oral liquid, 1 bottle(s)= 300 mL, Oral, Once  magnesium gluconate 500 mg oral tablet, 500 mg= 1 tab(s), Oral, BID,? ?Not Taking, Completed Rx  magnesium oxide 400 mg oral tablet, 400 mg= 1 tab(s), Oral, Daily  metoprolol tartrate 100 mg oral tablet, 100 mg= 1 tab(s), Oral, BID, 3 refills  Miralax - for colonoscopy prep, 238 gm= 14 packet(s), Oral, Once  Multi-Day Plus Minerals oral tablet, 1 tab(s), Oral, Daily  Pantoprazole 40 mg ORAL EC-Tablet, 40 mg= 1 tab(s), Oral, BID,? ?Not taking  Retacrit, 11258 units= 1 mL, Subcutaneous, Once  Solumedrol IV push / IM, 125 mg= 2 mL, IV Push, Once  TNF Medl (Template NonFormulary), See Instructions  Toradol 30 mg for IV Push, 30 mg= 1 mL, IV Push, Once  Vitamin B-12, 1 tab, Oral, TID  Vitamin D2 2000 intl units oral capsule, 2000 IntUnit= 1 cap(s), Oral, Daily, 3  refills  Allergies  No Known Allergies  Social History  Abuse/Neglect  No, 02/11/2021  Alcohol - High Risk, 12/04/2014  Past, Wine, 01/26/2021  Employment/School  disability, Work/School description: disabled. Operates hazardous equipment: No., 12/08/2016  Exercise - Does not exercise, 03/06/2015  Self assessment: Fair condition., 12/08/2016  Home/Environment  Lives with Spouse. Living situation: Home/Independent. Alcohol abuse in household: No. Substance abuse in household: No. Smoker in household: No. Injuries/Abuse/Neglect in household: No. Feels unsafe at home: No. Safe place to go: Yes. Family/Friends available for support: Yes. Concern for family members at home: No. Major illness in household: No. Financial concerns: No. TV/Computer concerns: No., 12/08/2016  Nutrition/Health  Caffeine intake amount: NONE. Wants to lose weight: Yes. Sleeping concerns: Yes. Feels highly stressed: No., 11/17/2015  Regular, 03/06/2015  Other  Sexual  Sexually active: Yes., 03/06/2015  Substance Use - Denies Substance Abuse, 12/04/2014  Never, 01/26/2021  Tobacco - Denies Tobacco Use, 12/04/2014  Never (less than 100 in lifetime), N/A, 02/11/2021  Family History  Alcohol user: Father.  Cataract.: Father.  Hyperlipidemia.: Brother.  Hypertension.: Father.  Tobacco user: Father.  Immunizations  Vaccine Date Status Comments   influenza virus vaccine, inactivated 09/26/2020 Given    influenza virus vaccine, inactivated 10/08/2019 Recorded    influenza virus vaccine, inactivated 10/05/2018 Given tolerated well   influenza virus vaccine, inactivated 12/13/2017 Given Nursing Judgment   influenza virus vaccine, inactivated 09/28/2016 Given    influenza virus vaccine, inactivated 11/17/2015 Given    influenza virus vaccine, inactivated 02/25/2015 Given Med Not Available   pneumococcal 23-polyvalent vaccine 01/05/2014 Given    influenza virus vaccine, inactivated 01/05/2014 Given    Health Maintenance  Health Maintenance  ???Pending?(in  the next year)  ??? ??OverDue  ??? ? ? ?Aspirin Therapy for CVD Prevention due??01/04/15??and every 1??year(s)  ??? ??Due?  ??? ? ? ?Alcohol Misuse Screening due??01/02/21??and every 1??year(s)  ??? ? ? ?Medicare Annual Wellness Exam due??02/11/21??and every 1??year(s)  ??? ? ? ?Tetanus Vaccine due??02/11/21??and every 10??year(s)  ??? ? ? ?Zoster Vaccine due??02/11/21??Unknown Frequency  ??? ??Due In Future?  ??? ? ? ?HF-Heart Failure Education not due until??11/04/21??and every 1??year(s)  ??? ? ? ?HF-LVEF not due until??11/04/21??and every 1??year(s)  ??? ? ? ?ADL Screening not due until??12/04/21??and every 1??year(s)  ??? ? ? ?Obesity Screening not due until??01/01/22??and every 1??year(s)  ???Satisfied?(in the past 1 year)  ??? ??Satisfied?  ??? ? ? ?ADL Screening on??12/04/20.??Satisfied by Yennifer Morris  ??? ? ? ?Blood Pressure Screening on??02/11/21.??Satisfied by Madison Chakraborty  ??? ? ? ?Body Mass Index Check on??02/11/21.??Satisfied by Madison Chakraborty  ??? ? ? ?Colorectal Screening on??12/18/20.??Satisfied by Radha Sosa  ??? ? ? ?Depression Screening on??02/11/21.??Satisfied by Madison Chakraborty  ??? ? ? ?Diabetes Screening on??02/11/21.??Satisfied by Rodolfo Du Jr.  ??? ? ? ?Hypertension Management-Blood Pressure on??02/11/21.??Satisfied by Madison Chakraborty  ??? ? ? ?Influenza Vaccine on??01/05/21.??Satisfied by Jessi Castro  ??? ? ? ?Obesity Screening on??02/11/21.??Satisfied by Madison Chakraborty  ??? ??Refused?  ??? ? ? ?Influenza Vaccine on??12/04/20.??Recorded by Joann Healy  ?  Lab Results  Test Name Test Result Date/Time   Sodium Lvl 133 mmol/L (Low) 02/11/2021 11:50 CST   Potassium Lvl 5.4 mmol/L (High) 02/11/2021 11:50 CST   Chloride 92 mmol/L (Low) 02/11/2021 11:50 CST   CO2 33 mmol/L (High) 02/11/2021 11:50 CST   Calcium Lvl 8.8 mg/dL 02/11/2021 11:50 CST   Glucose Lvl 96 mg/dL 02/11/2021 11:50 CST   BUN 8.0 mg/dL (Low) 02/11/2021 11:50  CST   Creatinine 0.76 mg/dL 02/11/2021 11:50 CST   eGFR-AA >105 02/11/2021 11:50 CST   eGFR-DAVID >105 mL/min/1.73 m2 02/11/2021 11:50 CST   Bili Total 0.4 mg/dL 02/11/2021 11:50 CST   Bili Direct 0.2 mg/dL 02/11/2021 11:50 CST   Bili Indirect 0.20 mg/dL 02/11/2021 11:50 CST   AST 30 unit/L 02/11/2021 11:50 CST   ALT 12 unit/L 02/11/2021 11:50 CST   Alk Phos 123 unit/L 02/11/2021 11:50 CST   Total Protein 6.7 gm/dL 02/11/2021 11:50 CST   Albumin Lvl 3.6 gm/dL 02/11/2021 11:50 CST   Globulin 3.1 gm/dL 02/11/2021 11:50 CST   A/G Ratio 1.2 ratio 02/11/2021 11:50 CST   WBC 4.1 x10(3)/mcL (Low) 02/11/2021 11:50 CST   RBC 3.45 x10(6)/mcL (Low) 02/11/2021 11:50 CST   Hgb 9.5 gm/dL (Low) 02/11/2021 11:50 CST   Hct 31.3 % (Low) 02/11/2021 11:50 CST   Platelet 186 x10(3)/mcL 02/11/2021 11:50 CST   MCV 90.7 fL 02/11/2021 11:50 CST   MCH 27.5 pg 02/11/2021 11:50 CST   MCHC 30.4 gm/dL (Low) 02/11/2021 11:50 CST   RDW 20.1 % (High) 02/11/2021 11:50 CST   MPV 9.3 fL 02/11/2021 11:50 CST   Abs Neut 2.17 x10(3)/mcL 02/11/2021 11:50 CST

## 2022-05-03 NOTE — HISTORICAL [facility]ER
This is a historical note converted from [place]. Formatting and pictures may have been removed.  Please reference [place] for original formatting and attached multimedia. Chief Complaint  Primary myelofibrosis  History of Present Illness  Problem List:  Primary Myelofibrosis  -Diagnosed 01/08/2014 via BMB  -JAK2 negative  ?   Current Treatment:  -PRN transfusions for Hgb <7 or symptomatic anemia  ?   -Procrit 40,000 units SubQ weekly  -Started: 11/20/2020  ?   Dose due: 5/28/2021  ?   Treatment History:  N/A  ?   HPI/Clinical History:  60-year-old gentleman with history of myelofibrosis, last seen in hematology clinic on 06/08/2017, subsequently lost to follow-up, now referred back to hematology for follow-up by internal medicine.  For full HPI please refer to MD last note dated 12/15/2020. Also refer to assessment and plan section.  ?   Interval History  5/28/2021: Patient?presents for follow up on weekly Procrit; he is scheduled to receive a dose today. VS stable. WBC 3.6; RBC 3.86; H&H 10.5 & 34.9; plts 271k; ANC 1250. He reports congestion and persistent fatigue for the last week. He received his 2nd Pfizer COVID vaccine last week and experienced low grade fever & oliguria?afterwards. Prior to receiving the 2nd vaccine, about 3 weeks after the 1st dose, he experienced loss of sense of taste for 2 days and sweats; he informed [facility] where he received the vaccine and no testing was done because rapid testing was not available and patient wanted quick results. He reports gurgling feeling when lying flat and decreased activity tolerance. Discussed the plan to send him to ER for workup and rapid testing; he asks for a wheelchair, stating he would not be able to walk the entire way without having to stop to catch his breath. After much discussion,?he states he has been self-medicating congestion since receiving his first dose of the vaccine. Will send down for testing and evaluation; instructed patient to return to  Infusion if he is negative for COVID.  Review of Systems  A complete 12-point?ROS was performed in full with pertinent positives as described in interval history. Remainder of ROS done in full and are negative.  Physical Exam  Vitals & Measurements  T:?36.9? ?C (Oral)? HR:?71(Peripheral)? RR:?22? BP:?126/71?  HT:?175.00?cm? WT:?145.800?kg? BMI:?47.61?  General: ?Alert and oriented, No acute distress.??  Appearance: Well-groomed; morbidly obese  HEENT: ?Normocephalic, Oral mucosa is moist.?Pupils are equal, round and reactive to light, Extraocular movements are intact, Normal conjunctiva.?  Neck: ?Supple, Non-tender, No lymphadenopathy, No thyromegaly.??  Respiratory: ?Lungs are clear to auscultation, Respirations are non-labored, Breath sounds are equal, Symmetrical chest wall expansion.??  Cardiovascular: ?Normal rate, Regular rhythm, No edema.??  Breast:??Breast exam not performed on todays visit.??  Gastrointestinal: ?Soft, Non-tender, Non-distended, Normal bowel sounds.??  Musculoskeletal: ?Normal strength.??  Integumentary: ?Warm, dry, intact.??  Neurologic: ?Alert, Oriented, No focal deficits, Cranial Nerves II-XII are grossly intact.??  Cognition and Speech: ?Oriented, Speech clear and coherent.??  Psychiatric: ?Cooperative, Appropriate mood & affect. ?  ECOG Performance Scale: 2 - Capable of all self-care but unable to carry out any work activities. Up and about greater than 50 percent of waking hours.?  Assessment/Plan  1.?Primary myelofibrosis?D47.1  # Primary myelofibrosis, diagnosed on bone marrow biopsy (01/08/2014).? JAK2 mutation negative.? Extensive osteosclerosis and fibrosis.? Cellularity 10%.? Flow cytometry with <3% bone marrow blasts.? Normal immunophenotype.  -05/27/2017: Bone marrow biopsy: Markedly hypocellular, 10%; myelofibrosis; bone marrow predominantly fibrotic; a small population of monoclonal CD5 negative, CD10 negative B cells, representing approximately 4% of  lymphocytes  -Noncompliant with follow-up  -Not a candidate for allogenic stem cell transplant because of comorbidities and noncompliance  -PRBC x8 between 02/19/2015-12/12/2017  -10/28/2020: Erythropoietin level?370.4  -EGD (11/04/2020) during hospitalization 10/28/2020-11/04/2020 ([facility]), negative; stool for occult blood was positive x3; outpatient colonoscopy was recommended  -Feraheme 510 mg IV x2 (11/20/2020, 11/27/2020)?(relative iron deficiency)  -Procrit 40,000 subcutaneously weekly, started 11/20/2020  ?   # ???Chronic anemia, hemoglobin ~8.5 g/dL  -Normocytic  -PRBC x8 (02/19/2015-12/12/2017)  -PRBC x4 units?(10/28/2020-11/04/2020; hemoglobin 6.9)  -EGD (11/04/2020) during hospitalization 10/28/2020-11/04/2020 ([facility]), negative; stool for occult blood was positive x3; outpatient colonoscopy was recommended  -Feraheme 510 mg IV x2 (11/20/2020, 11/27/2020)  -Procrit 40,000 subcutaneously weekly, started 11/20/2020  ?   # ???Iron deficiency:  -10/28/2020:?Serum iron 8, low. ?TIBC 192, low.? Transferrin saturation 4%, low.? Ferritin 294.35, elevated?(relative iron deficiency)  -Screening colonoscopy 04/27/2015, was normal  -Patient is status post gastric bypass?in 2000  -EGD (11/04/2020) during hospitalization 10/28/2020-11/04/2020 ([facility]), negative; stool for occult blood was positive x3; outpatient colonoscopy was recommended  -Colonoscopy being planned by [facility]  -Feraheme 510 mg IV x2 (11/20/2020, 11/27/2020)  ?   # ???Leukopenia and neutropenia  -WBC 2.3.? ANC 0.77?(secondary to myelofibrosis)  -Platelet count normal  ?   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   # ???Vitamin  B12 deficiency  -B12 level 199 (09/01/2020)  -Iron stores normal (09/01/2020)  -10/28/2020:?Says that he has been taking?vitamin B12 orally, both tubal as well as pills,?14-12 years?after gastric bypass surgery  -10/28/2020: B12 level 1773, elevated?(absorbing satisfactorily via oral route).? Intrinsic factor antibody negative.  ?   # ???Morbidly obese, status post gastric bypass surgery?(2000):  -10/28/2020: Weight 322.97 LBS; height 175 cm; BMI 47.84  ?   # ???Atrial fibrillation;?on anticoagulation with Xarelto  ?  #Alcohol abuse.?  #Noncompliant with follow-ups.  #History of epistaxis, requiring cauterization in the past  #History of gastric bypass?in 2000  #History of back surgery?in [place]?in 2016 or 2017  ?  Plan:  Supportive care?with periodic transfusions?(for symptomatic anemia?or if hemoglobin <7 g/dL)  Erythropoietin level <500  Procrit 40,000 units subcutaneously weekly, started?11/20/2020  Not a candidate for allogenic stem cell transplant because of comorbidities.  ?  Chronically anemic.  PRBC x8 (02/19/2015-12/12/2017)  PRBC x5 (10/28/2020-11/04/2020)  Usual?hemoglobin ~8.5 g/dL  ?  -Relative iron deficiency (labs: 10/28/2020)  -Feraheme?510 mg IV x2 (11/20/2020, 11/27/2020)  -Recheck iron stores 6 weeks post completion of Feraheme  -FIT?test  -Screening colonoscopy 04/27/2015, was normal  -Patient is status post gastric bypass?in 2000  -EGD (11/04/2020): Negative?([facility],?hospitalized?10/28/2020-11/04/2020, symptomatic anemia, heme positive stool)  -Colonoscopy being planned by?[facility]  ?  B12 deficiency was noted on 09/01/2020.  Intrinsic factor antibody negative  Apparently, has been?taking vitamin B12?orally,?chewable as well as pills, for last 10-12 years?after gastric bypass surgery.  -Absorbing satisfactorily via oral route?(labs:?10/28/2020; B12 level?1773)  ?  Check CBC every week?prior to each start of Procrit  ?  Patient to present to ER for rapid COVID testing.  Report given to Irwin in ER.  If negative for COVID, patient may return to Infusion for Procrit.  ?  Continue supportive care measures.  Check CBC on 6/4/2021 & 6/11/2021?to be reviewed by infusion nurse.  If Hgb <7, transfuse 2U PRBCs leukoreduced. No transfusion indicated for Hgb?10.5 today.  Continue Procrit weekly & weekly CBC monitoring.  Follow up in?3 weeks (F2F on 6/18/2021) with CBC, CMP.  Ordered:  ?   Problem List/Past Medical History  Ongoing  Acute urinary retention  Anemia  Atrial fibrillation  BPH with urinary obstruction  Coagulopathy  Congestive heart failure  HTN (hypertension)  Hyponatremia  Leg pain  Liver function failure  LV dysfunction  Morbid obesity(  Probable Diagnosis  )  Myelofibrosis  Myofibrosis  Primary myelofibrosis  Primary myelofibrosis  Historical  Apnea, sleep  Back pain  Cataract  CHF - Congestive heart failure  Hypertension  Nose bleed  Procedure/Surgical History  Transfusion of Nonautologous Red Blood Cells into Peripheral Vein, Percutaneous Approach (12/11/2017)  BONE MARROW ASPIRATION PERFORMED WITH BONE MARROW BIOPSY THROUGH THE SAME INCISION ON THE SAME DATE OF SERVICE (05/25/2017)  Bone marrow; biopsy, needle or trocar (05/25/2017)  Drainage of Bone Marrow, Percutaneous Approach, Diagnostic (05/25/2017)  Extraction of Iliac Bone Marrow, Percutaneous Approach, Diagnostic (05/25/2017)  Drainage of Right Upper Extremity, Percutaneous Approach (10/03/2016)  Drainage of Right Lower Arm Skin, External Approach (10/01/2016)  Incision and drainage of abscess (eg, carbuncle, suppurative hidradenitis, cutaneous or subcutaneous abscess, cyst, furuncle, or paronychia); simple or single (10/01/2016)  Drainage of Bladder with Drainage Device, Via Natural or Artificial Opening (09/25/2016)  Insertion of temporary indwelling bladder catheter; simple (eg, Gordon) (09/25/2016)  Control nasal hemorrhage, anterior, simple (limited cautery and/or packing) any method (02/05/2016)  Packing of  Nasal Region using Packing Material (02/05/2016)  Cataract Extraction Phacoemulsification (Right) (08/20/2015)  Extracapsular cataract removal with insertion of intraocular lens prosthesis (1 stage procedure), manual or mechanical technique (eg, irrigation and aspiration or phacoemulsification) (08/20/2015)  Insertion of intraocular lens prosthesis at time of cataract extraction, one-stage (08/20/2015)  Phacoemulsification and aspiration of cataract (08/20/2015)  Cataract Extraction Phacoemulsification (Left) (06/09/2015)  Extracapsular cataract removal with insertion of intraocular lens prosthesis (1 stage procedure), manual or mechanical technique (eg, irrigation and aspiration or phacoemulsification) (06/09/2015)  Insertion of intraocular lens prosthesis at time of cataract extraction, one-stage (06/09/2015)  Phacoemulsification and aspiration of cataract (06/09/2015)  Colonoscopy (04/27/2015)  Colonoscopy (04/27/2015)  Colonoscopy, flexible; diagnostic, including collection of specimen(s) by brushing or washing, when performed (separate procedure) (04/27/2015)  Transfusion of packed cells (02/23/2015)  Transfusion of packed cells (02/20/2015)  Biopsy Bone Marrow Aspiration (.) (01/08/2014)  Biopsy of bone marrow (01/08/2014)  gastric bypass  hernia repair   Medications  allopurinol 100 mg oral tablet, 100 mg= 1 tab(s), Oral, Daily, 4 refills  amlodipine 5 mg oral tablet, 5 mg= 1 tab(s), Oral, Daily, 3 refills  aspirin 325 mg oral Delayed Release (EC) tablet, 325 mg= 1 tab(s), Oral, Daily, 6 refills  bisacodyl 5 mg ORAL enteric coated tablet, 30 mg= 6 tab(s), Oral, Once  calcium carbonate 600 mg oral tablet, 600 mg= 1 tab(s), Oral, Daily  cyanocobalamin 1000 mcg oral tablet, 1000 mcg= 1 tab(s), Oral, Daily, 5 refills  diltiazem 180 mg/24 hours oral CAPsule, extended release, 180 mg= 1 cap(s), Oral, Daily, 5 refills  ferrous sulfate 325 mg oral tablet, 325 mg= 1 tab(s), Oral, Every other day, 3 refills,? ?Not  taking  Flomax 0.4 mg oral capsule, 0.4 mg= 1 cap(s), Oral, Daily, 4 refills  Lasix 40 mg oral tablet, 40 mg= 1 tab(s), Oral, Daily, 4 refills  magnesium citrate oral liquid, 1 bottle(s)= 300 mL, Oral, Once  magnesium oxide 400 mg oral tablet, 400 mg= 1 tab(s), Oral, Daily  metoprolol tartrate 100 mg oral tablet, 100 mg= 1 tab(s), Oral, BID, 4 refills  Miralax - for colonoscopy prep, 238 gm= 14 packet(s), Oral, Once  Multi-Day Plus Minerals oral tablet, 1 tab(s), Oral, Daily  Procrit 2000 units/mL preservative-free injectable solution, 2000 units= 1 mL, Subcutaneous, 3x/Wk,? ?Not taking  Retacrit 4000 units/mL preservative-free injectable solution, See Instructions  Solumedrol IV push / IM, 125 mg= 2 mL, IV Push, Once  Toradol 30 mg for IV Push, 30 mg= 1 mL, IV Push, Once  Vitamin D2 2000 intl units oral capsule, 2000 IntUnit= 1 cap(s), Oral, Daily, 3 refills  Allergies  vancomycin  Social History  Abuse/Neglect  No, No, Yes, 05/28/2021  Alcohol - High Risk, 12/04/2014  Current, Wine, 05/28/2021  Employment/School  disability, Work/School description: disabled. Operates hazardous equipment: No., 12/08/2016  Exercise - Does not exercise, 03/06/2015  Self assessment: Fair condition., 12/08/2016  Home/Environment  Lives with Spouse. Living situation: Home/Independent. Alcohol abuse in household: No. Substance abuse in household: No. Smoker in household: No. Injuries/Abuse/Neglect in household: No. Feels unsafe at home: No. Safe place to go: Yes. Family/Friends available for support: Yes. Concern for family members at home: No. Major illness in household: No. Financial concerns: No. TV/Computer concerns: No., 12/08/2016  Nutrition/Health  Caffeine intake amount: NONE. Wants to lose weight: Yes. Sleeping concerns: Yes. Feels highly stressed: No., 11/17/2015  Regular, 03/06/2015  Other  Sexual  Gender Identity Identifies as male., 03/01/2021  Sexually active: Yes., 03/06/2015  Substance Use - Denies Substance Abuse,  12/04/2014  Never, 05/28/2021  Tobacco - Denies Tobacco Use, 12/04/2014  Never (less than 100 in lifetime), No, 05/28/2021  Family History  Alcohol user: Father.  Cataract.: Father.  Hyperlipidemia.: Brother.  Hypertension.: Father.  Tobacco user: Father.  Immunizations  Vaccine Date Status Comments   COVID-19 MRNA, LNP-S, PF- Pfizer 04/30/2021 Recorded    influenza virus vaccine, inactivated 09/26/2020 Given    influenza virus vaccine, inactivated 10/08/2019 Recorded    influenza virus vaccine, inactivated 10/05/2018 Given tolerated well   influenza virus vaccine, inactivated 12/13/2017 Given Nursing Judgment   influenza virus vaccine, inactivated 10/05/2017 Recorded    influenza virus vaccine, inactivated 09/28/2016 Given    influenza virus vaccine, inactivated 11/17/2015 Given    influenza virus vaccine, inactivated 02/25/2015 Given Med Not Available   pneumococcal 23-polyvalent vaccine 01/05/2014 Given    influenza virus vaccine, inactivated 01/05/2014 Given    Health Maintenance  Health Maintenance  ???Pending?(in the next year)  ??? ??OverDue  ??? ? ? ?Alcohol Misuse Screening due??01/02/21??and every 1??year(s)  ??? ??Due?  ??? ? ? ?Tetanus Vaccine due??05/28/21??and every 10??year(s)  ??? ? ? ?Zoster Vaccine due??05/28/21??Unknown Frequency  ??? ??Due In Future?  ??? ? ? ?HF-LVEF not due until??11/04/21??and every 1??year(s)  ??? ? ? ?Obesity Screening not due until??01/01/22??and every 1??year(s)  ??? ? ? ?Aspirin Therapy for CVD Prevention not due until??02/24/22??and every 1??year(s)  ??? ? ? ?HF-Heart Failure Education not due until??04/30/22??and every 1??year(s)  ??? ? ? ?Hypertension Management-BMP not due until??05/14/22??and every 1??year(s)  ??? ? ? ?ADL Screening not due until??05/19/22??and every 1??year(s)  ??? ? ? ?Medicare Annual Wellness Exam not due until??05/20/22??and every 1??year(s)  ???Satisfied?(in the past 1 year)  ??? ??Satisfied?  ??? ? ? ?ADL Screening on??05/19/21.??Satisfied by  Livia Francois LPN  ??? ? ? ?Aspirin Therapy for CVD Prevention on??02/24/21.??Satisfied by Nneka Lofton LPN  ??? ? ? ?Blood Pressure Screening on??05/28/21.??Satisfied by Lucinda López RN  ??? ? ? ?Body Mass Index Check on??05/28/21.??Satisfied by Chelsea Zhou  ??? ? ? ?Colorectal Screening on??12/18/20.??Satisfied by Radha Sosa  ??? ? ? ?Coronary Artery Disease Maintenance-Antiplatelet Agent Prescribed on??02/19/21.??Satisfied by Codey Ansari MD  ??? ? ? ?Depression Screening on??05/28/21.??Satisfied by Chelsea Zhou  ??? ? ? ?Diabetes Screening on??05/14/21.??Satisfied by Buck Leigh  ??? ? ? ?Hypertension Management-Blood Pressure on??05/28/21.??Satisfied by Lucinda López RN  ??? ? ? ?Influenza Vaccine on??01/05/21.??Satisfied by Jessi Castro  ??? ? ? ?Medicare Annual Wellness Exam on??05/20/21.??Satisfied by Rufus Paiz MD  ??? ? ? ?Obesity Screening on??05/28/21.??Satisfied by Chelsea Zhou  ??? ??Refused?  ??? ? ? ?Influenza Vaccine on??12/04/20.??Recorded by Joann Healy  ?  Lab Results  Test Name Test Result Date/Time   WBC 3.6 x10(3)/mcL (Low) 05/28/2021 07:20 CDT   RBC 3.86 x10(6)/mcL (Low) 05/28/2021 07:20 CDT   Hgb 10.5 gm/dL (Low) 05/28/2021 07:20 CDT   Hct 34.9 % (Low) 05/28/2021 07:20 CDT   Platelet 271 x10(3)/mcL 05/28/2021 07:20 CDT   MCV 90.4 fL 05/28/2021 07:20 CDT   MCH 27.2 pg 05/28/2021 07:20 CDT   MCHC 30.1 gm/dL (Low) 05/28/2021 07:20 CDT   RDW 19.0 % (High) 05/28/2021 07:20 CDT   MPV 9.9 fL 05/28/2021 07:20 CDT   Abs Neut 1.25 x10(3)/mcL (Low) 05/28/2021 07:20 CDT

## 2022-05-03 NOTE — HISTORICAL [facility] CERNER
This is a historical note converted from [employer]. Formatting and pictures may have been removed.  Please reference [employer] for original formatting and attached multimedia. Chief Complaint  Pt reports period of disorientation this AM. Pt awake and alert on ED arrival. Reports periods of insomnia.  History of Present Illness  ?? The patient is a 58 yo male with a PMH significant for primary myelofibrosis, HFpEF, HTN, chronic hyponatremia who presented to the ED because he did not feel right. Patient states that around 11pm last night, he felt disoriented and was unable to figure out what he was doing and where he was going. States that he remembers feeling confused like this before, and decided to come to the ED for further evaluation as previously he was found to be hyponatremic. Denies falling, syncope, nausea/vomiting, seizure activity, headache, or muscle cramping. Reports that he drinks 7-9 20oz water bottles a day, reports last time he wasnt drinking enough water. Otherwise, he denies dysuria, frequency/urgency, fever/chills, constipation/diarrhea, swelling, SOB, chest pain, abdominal pain.  ?? In the ED, vital signs significant for hypertension. Labwork significant for sodium of 126, patient was started on IV NS 150mL. Medicine then called for further management of symptomatic hyponatremia.  ?  Social: Patient lives at home alone, wife recently passed away. Has a son who lives in town. States he quit drinking about 1 week ago, previously drinking 12-15 cans of beer daily, he had stopped but then started again when his wife . Denies smoking or illicit drug use.  Review of Systems  Gen: AOx3,?No fever, No weight changes  Eye: No blindness  Heart:?No chest pains, No palpitations,?No diaphoresis  Lungs:?No SOB, No wheezing  GI: No abd pain,?No Nausea, No vomiting  : No hematuria, No dysuria  Musk: No LE swelling  Integumentary: No rash, No pruritus  Neuro: Normal speech, No focal weakness, No headache  Physical  Exam  Vitals & Measurements  T:?36.7? ?C (Oral)? HR:?96(Peripheral)? RR:?18? BP:?146/96? SpO2:?100%? WT:?150?kg?  General: No acute distress.  Eye: Extraocular movements are intact, Normal conjunctiva. PERRLA  HENT: Normal hearing, Oral mucosa is moist, no pharyngeal erythema  Neck:?No JVP on my exam  Respiratory: Lungs are clear to auscultation,?no crackles or wheezes heard.?Respirations are non-labored  Cardiovascular: Regular rhythm and rate, normal S1, S2, No murmurs,?Normal peripheral perfusion, no LE edema  Gastrointestinal: Soft, Non-tender, bowel sounds present. Distended without fluid thrill. Superficial varicosities noted, +spider angiomas. Midline scarring noted from previous bypass surgery and hernia repair  Musculoskeletal: No tenderness, no swelling  Integumentary: Warm, Moist, No pallor,  Neurologic: Alert, Oriented but unable to remember details like his sons phone number.?No gross focal deficits, Cranial Nerves II-XII are grossly intact.  Cognition and Speech: Oriented, Speech clear and coherent, Functional cognition intact.  Psychiatric: Cooperative, Appropriate mood & affect  Assessment/Plan  1. Chronic Hyponatremia, moderate  -Sodium on admission 126  -Patient appears euvolemic on exam, based on history there is a high suspicion that etiology is multifactorial: polydipsia, poor nutrition, beer potomania, diuretics use  -Pending serum osm, urine sodium/osm  -For now will fluid restrict, patient does not have severe symptoms?  -BMP ordered q6hr, next at 0900  -Hold home lasix  ?  2. Heart Failure with?Preserved EF, compensated ?  -Last TTE done June 2017: EF 55% with diastolic dysfunction?  -BNP on admission 212  -CXR pending  -Continue lisinopril and metoprolol  -Strict I&Os and fluid restriction  ?  3. Primary Myelofibrosis  -JAK2 negative  -Last seen in [facility] June 2017, at this time plans were to monitor closely and transfuse as needed  -Currently pancytopenic, but not requiring  transfusion  ?  4. HTN  -Mildly elevated in ED  -Continue home lisinopril and metoprolol, titrate as needed  ?  5. Alcohol abuse  6. Liver cirrhosis  -Quit 1 week ago, previously drank 12-15 12oz cans of beer daily  -MELD score 9 (using most recent INR as current is pending)  -Abdo?US in 2017 suggested?changes consistent with?cirrhosis,?no biopsy performed?in chart ?  -EtOH level on admission negative  -Daily folate and thiamine  -LFTs WNL on admission, ordered coag studies  -Ativan PRN for withdrawal symptoms  ?  7. h/o BPH with urinary retention  -Continue home flomax  ?  ?  Disposition: Admit to telemetry for close monitoring of hyponatremia. Pending studies to confirm etiology. Fluid restrict 800-1000cc at this time. Lovenox 40mg for DVT prophylaxis.   Problem List/Past Medical History  Ongoing  Acute urinary retention  Anemia  Back pain  BPH with urinary obstruction  Cataract  Coagulopathy  Congestive heart failure  HTN (hypertension)  Hyponatremia  Leg pain  Liver function failure  LV dysfunction  Morbid obesity(  Probable Diagnosis  )  Myelofibrosis  Myofibrosis  Nose bleed  Primary myelofibrosis  Historical  Apnea, sleep  CHF - Congestive heart failure  Hypertension  Procedure/Surgical History  Transfusion of Nonautologous Red Blood Cells into Peripheral Vein, Percutaneous Approach (12/11/2017)  BONE MARROW ASPIRATION PERFORMED WITH BONE MARROW BIOPSY THROUGH THE SAME INCISION ON THE SAME DATE OF SERVICE (05/25/2017)  Bone marrow; biopsy, needle or trocar (05/25/2017)  Drainage of Bone Marrow, Percutaneous Approach, Diagnostic (05/25/2017)  Extraction of Iliac Bone Marrow, Percutaneous Approach, Diagnostic (05/25/2017)  Drainage of Right Upper Extremity, Percutaneous Approach (10/03/2016)  Drainage of Right Lower Arm Skin, External Approach (10/01/2016)  Incision and drainage of abscess (eg, carbuncle, suppurative hidradenitis, cutaneous or subcutaneous abscess, cyst, furuncle, or paronychia); simple or  single (10/01/2016)  Drainage of Bladder with Drainage Device, Via Natural or Artificial Opening (09/25/2016)  Insertion of temporary indwelling bladder catheter; simple (eg, Gordon) (09/25/2016)  Control nasal hemorrhage, anterior, simple (limited cautery and/or packing) any method (02/05/2016)  Packing of Nasal Region using Packing Material (02/05/2016)  Cataract Extraction Phacoemulsification (Right) (08/20/2015)  Extracapsular cataract removal with insertion of intraocular lens prosthesis (1 stage procedure), manual or mechanical technique (eg, irrigation and aspiration or phacoemulsification) (08/20/2015)  Insertion of intraocular lens prosthesis at time of cataract extraction, one-stage (08/20/2015)  Phacoemulsification and aspiration of cataract (08/20/2015)  Cataract Extraction Phacoemulsification (Left) (06/09/2015)  Extracapsular cataract removal with insertion of intraocular lens prosthesis (1 stage procedure), manual or mechanical technique (eg, irrigation and aspiration or phacoemulsification) (06/09/2015)  Insertion of intraocular lens prosthesis at time of cataract extraction, one-stage (06/09/2015)  Phacoemulsification and aspiration of cataract (06/09/2015)  Colonoscopy (04/27/2015)  Colonoscopy (04/27/2015)  Colonoscopy, flexible; diagnostic, including collection of specimen(s) by brushing or washing, when performed (separate procedure) (04/27/2015)  Transfusion of packed cells (02/23/2015)  Transfusion of packed cells (02/20/2015)  Biopsy Bone Marrow Aspiration (.) (01/08/2014)  Biopsy of bone marrow (01/08/2014)  gastric bypass  hernia repair   Medications  Inpatient  bisacodyl 5 mg ORAL enteric coated tablet, 30 mg= 6 tab(s), Oral, Once  Flomax 0.4 mg oral capsule, 0.4 mg= 1 cap(s), Oral, Daily  lisinopril 5 mg oral tablet, 5 mg= 1 tab(s), Oral, Daily  Lovenox, 40 mg= 0.4 mL, Subcutaneous, Daily  magnesium citrate oral liquid, 1 bottle(s)= 300 mL, Oral, Once  metoprolol tartrate 100 mg oral  tablet, 100 mg= 1 tab(s), Oral, BID  Miralax - for colonoscopy prep, 238 gm= 14 packet(s), Oral, Once  Solumedrol IV push / IM, 125 mg= 2 mL, IV Push, Once  Toradol 30 mg for IV Push, 30 mg= 1 mL, IV Push, Once  Home  Flomax 0.4 mg oral capsule, 0.4 mg= 1 cap(s), Oral, Daily, 3 refills  Lasix 40 mg oral tablet, 40 mg= 1 tab(s), Oral, Daily, 3 refills  lisinopril 5 mg oral tablet, 5 mg= 1 tab(s), Oral, Daily, 3 refills  magnesium 50 mg oral tablet  metoprolol tartrate 100 mg oral tablet, 100 mg= 1 tab(s), Oral, BID, 3 refills  Multi-Day Plus Minerals oral tablet, 1 tab(s), Oral, Daily  Vitamin D  Allergies  No Known Allergies  Social History  Alcohol - High Risk, 12/04/2014  Past, 12/27/2017  Current, Beer, Daily, 5 drinks/episode average. 9.00 drinks/episode maximum., 05/22/2017  Current, Beer, Daily, 02/01/2017  Employment/School  disability, Work/School description: disabled. Operates hazardous equipment: No., 12/08/2016  Exercise - Does not exercise, 03/06/2015  Self assessment: Fair condition., 12/08/2016  Home/Environment  Lives with Spouse. Living situation: Home/Independent. Alcohol abuse in household: No. Substance abuse in household: No. Smoker in household: No. Injuries/Abuse/Neglect in household: No. Feels unsafe at home: No. Safe place to go: Yes. Family/Friends available for support: Yes. Concern for family members at home: No. Major illness in household: No. Financial concerns: No. TV/Computer concerns: No., 12/08/2016  Nutrition/Health  Caffeine intake amount: NONE. Wants to lose weight: Yes. Sleeping concerns: Yes. Feels highly stressed: No., 11/17/2015  Regular, 03/06/2015  Other  Sexual  Sexually active: Yes., 03/06/2015  Substance Abuse - Denies Substance Abuse, 12/04/2014  Never, 05/20/2016  Tobacco - Denies Tobacco Use, 12/04/2014  Never smoker, 09/25/2016  Family History  Alcohol user: Father.  Cataract.: Father.  Hyperlipidemia.: Brother.  Hypertension.: Father.  Tobacco user:  Father.  Immunizations  Vaccine Date Status Comments   influenza virus vaccine, inactivated 10/05/2018 Given tolerated well   influenza virus vaccine, inactivated 12/13/2017 Given Nursing Judgment   influenza virus vaccine, inactivated 09/28/2016 Given    influenza virus vaccine, inactivated 11/17/2015 Given    influenza virus vaccine, inactivated 02/25/2015 Given Med Not Available   pneumococcal 23-polyvalent vaccine 01/05/2014 Given    influenza virus vaccine, inactivated 01/05/2014 Given    Lab Results  Labs Last 24 Hours?  ?Chemistry? Hematology/Coagulation?   Sodium Lvl:?126 mmol/L?Low (06/08/19 03:07:00) WBC:?1.9 x10(3)/mcL?Low (06/08/19 03:07:00)   Potassium Lvl: 3.6 mmol/L (06/08/19 03:07:00) RBC:?3.32 x10(6)/mcL?Low (06/08/19 03:07:00)   Chloride:?93 mmol/L?Low (06/08/19 03:07:00) Hgb:?10 gm/dL?Low (06/08/19 03:07:00)   CO2: 24 mmol/L (06/08/19 03:07:00) Hct:?29.5 %?Low (06/08/19 03:07:00)   Calcium Lvl:?7.9 mg/dL?Low (06/08/19 03:07:00) Platelet: 140 x10(3)/mcL (06/08/19 03:07:00)   Magnesium Lvl: 1.9 mg/dL (06/08/19 03:07:00) MCV: 88.9 fL (06/08/19 03:07:00)   Glucose Lvl: 105 mg/dL (06/08/19 03:07:00) MCH: 30.1 pg (06/08/19 03:07:00)   BUN: 11 mg/dL (06/08/19 03:07:00) MCHC: 33.9 gm/dL (06/08/19 03:07:00)   Creatinine: 0.8 mg/dL (06/08/19 03:07:00) RDW:?16.6 %?High (06/08/19 03:07:00)   eGFR-AA: >105 (06/08/19 03:07:00) MPV: 9.9 fL (06/08/19 03:07:00)   eGFR-DAVID: >105 (06/08/19 03:07:00) Abs Neut:?0.89 x10(3)/mcL?Low (06/08/19 03:07:00)   Bili Total: 0.6 mg/dL (06/08/19 03:07:00) Segs Man:?38 %?Low (06/08/19 03:07:00)   Bili Direct: 0.3 mg/dL (06/08/19 03:07:00) Band Man: 10 % (06/08/19 03:07:00)   Bili Indirect: 0.3 mg/dL (06/08/19 03:07:00) Lymph Man: 35 % (06/08/19 03:07:00)   AST: 25 unit/L (06/08/19 03:07:00) Monocyte Man:?10 %?High (06/08/19 03:07:00)   ALT: 26 unit/L (06/08/19 03:07:00) Eos Man: 0 % (06/08/19 03:07:00)   Alk Phos: 109 unit/L (06/08/19 03:07:00) Basophil Man:?5 %?High (06/08/19  03:07:00)   Total Protein: 6.6 gm/dL (06/08/19 03:07:00) Myelo Man: 1 % (06/08/19 03:07:00)   Albumin Lvl: 3.8 gm/dL (06/08/19 03:07:00) Abn Lymph Man:?1 %?High (06/08/19 03:07:00)   Globulin: 2.8 gm/mL (06/08/19 03:07:00) NRBC Man: 9 % (06/08/19 03:07:00)   A/G Ratio: 1.4 ratio (06/08/19 03:07:00) Platelet Est: Adequate (06/08/19 03:07:00)   Phosphorus: 4.2 mg/dL (06/08/19 03:07:00) Anisocyte: 2+ (06/08/19 03:07:00)   NT pro BNP.:?212 pg/mL?High (06/08/19 03:07:00) Poik: 1+ (06/08/19 03:07:00)   Total CK: 171 unit/L (06/08/19 03:07:00) Polychrom: 1+ (06/08/19 03:07:00)   CK MB: 2.2 ng/mL (06/08/19 03:07:00) RBC Morph: Abnormal (06/08/19 03:07:00)   Troponin-I: <0.015 (06/08/19 03:07:00) Schistocyte: 1+ (06/08/19 03:07:00)   U pH: 6 (06/08/19 03:35:00)    U Sodium: 16 mmol/L (06/08/19 03:35:00)    Diagnostic Results  CXR Pending      HO-II addendum.?  Patient seen and examined; Chart reviewed.?  ?   In summary, this is a 59 y.o M who is admitted for hyponatremia (likely chronic in etiology) and instances of confusion and memory loss. He is AAOx3 at the time of my examination. Admits to heavy alcohol intake in the past but quit drinking about a week ago. Has been admitted in the past for similar presentation and treated for hyponatremia as well. Na 126 (previously 127 in march). Will treat as acute hyponatremia given no relative measure of Na over the last 48 hours. Admits to polydipsia and is on diuretic therapy as well. hx notable for a bone marrow?dysrasia but?he is not currently on any ctx.?Free water fluid restriction. BMP checks every 2 hours for the first half day - will reevaluate and increase frequency. Continue neurochecks. Urine studies as above. Etiology of hyponatremia is likely beer potomania along with polydipsia. Euvolemic on physical exam.  Also with likely hx of advancing cirrhosis; coags ordered. Will also order ammonia level as that can contribute to fluctuating mental status for him. Consider  repeating RUQ US.  ?   Seen on AM rounds. Pts sons name is Nate Hernandez, works at [employer]. Will order CT head in light of AMS/confusion. Also plan for post void bladder scan to assess chronic urinary retention. Ammonia also found to be elevated, could have component of hepatic encephalopathy. Ordered Lactulose q2 hours until patient makes bowel movment. Will titrate dose to 2-3 bowel mvts daily. BMP slowly improving, maintain fluid restriction and monitor closely. Pending osm studies.   I was present with the resident during the history and physical examination.  ???  [x ] I discussed the case with the resident and agree with the findings and plan as documented in the residents note.  [ ] I discussed the case with the resident and agree with the findings and plan as documented in the residents note except:  ?   58 yo male with what appears to be chronic hyponatremia admitted for new onset confusion/encephalopathy.? Full workup pending at this time of etiology of fluctuating mental status.? On my examination this morning he is AAOx4 and conversive.? Likely multifactorial in nature based on subjective history from Mr. Hernandez however awaiting studies to return.?  ?   MD Rene  ID Staff

## 2022-05-03 NOTE — HISTORICAL [facility]ERNER
This is a historical note converted from [facility]. Formatting and pictures may have been removed.  Please reference [facility] for original formatting and attached multimedia. Chief Complaint  Primary Myelofibrosis  History of Present Illness  Problem List:  Primary Myelofibrosis  -Diagnosed 01/08/2014 via BMB  -JAK2 negative  ?   Current Treatment:  -PRN transfusions for Hgb <7 or symptomatic anemia  ?   -Procrit 40,000 units SubQ weekly  -Started: 11/20/2020  ?   Dose due: 12/29/2020  ?   Treatment History:  N/A  ?   HPI/Clinical History:  60-year-old gentleman with history of myelofibrosis, last seen in hematology clinic on 06/08/2017, subsequently lost to follow-up, now referred back to hematology for follow-up by internal medicine.  For full HPI please refer to MD last note dated 12/15/2020. Also refer to assessment and plan section.  ?  Interval History:  Patient presents today for a scheduled follow-up, alone for PMF. Today he states that he feels?okay, he endorses no new problems or concerns today.?He is UTD on his weekly Procrit injections. Labs today reviewed with patient WBC: 2.8, H/H: 8.9/29.4. Denies HA, fever, night sweats, SOB, CP, edema, neuropathy. Denies N/V/C/D, abdominal pain, change in bowel/bladder habits, blood in the urine/stool. Appetite good, weight stable, denies unintentional weight loss/gain. Reviewed patient medications. Patient denies needing any medication refills at this time. Denies questions/needs/concerns.  ?  Review of Systems  A complete 12 point ROS was performed in full with pertinent positives described in interval history. Remainder of ROS done in full and are negative.?  Physical Exam  Vitals & Measurements  T:?37.1? ?C (Oral)? HR:?80(Peripheral)? RR:?20? BP:?134/90?  HT:?175.00?cm? WT:?137.500?kg? BMI:?44.9?  Vital Signs Reviewed  General: AAO, NAD noted  Eye: PERRLA, EOMI  Neuro: Normal mentation, strength 5/5 on all 4 extremities, no sensory deficits  HET: Normocephalic,  adequate hearing,?no oral ulcers, mucous membranes pink/moist/intact, no pharyngeal erythema, poor dentition  Neck: Supple, non-tender, no lymphadenopathy  Respiratory: Non-labored breathing, symmetrical chest wall expansion, BBS CTA, no crackles/wheezing/rhonchi noted  Cardiovascular: S1S2 noted, RRR, no M/R/G, pulses equal in all extremities, normal peripheral perfusion  Abdomen: Soft, non-tender, non-distended, BS+, no hepatosplenomegaly  Musculoskeletal: Normal ROM, normal gait, ambulates without assistance  Integumentary: No rashes, no bruises or purpura, warm and dry, normal for ethnicity  Neurologic: No focal deficits, Cranial nerves II-XII are grossly intact.  Cognition and Speech: Speech clear and coherent, functional cognition intact  Psychiatric: Cooperative, appropriate mood and affect for situation, normal judgement, no suicidal or homicidal ideations  ?  ?   The [employer] Toxicity Scores:  ?   ECOG Performance Scale: 1 - Strenuous physical activity restricted; fully ambulatory and able to carry out light work.  ?  Assessment/Plan  1.?Myelofibrosis?D75.81  ? # Primary myelofibrosis, diagnosed on bone marrow biopsy (01/08/2014).? JAK2 mutation negative.? Extensive osteosclerosis and fibrosis.? Cellularity 10%.? Flow cytometry with <3% bone marrow blasts.? Normal immunophenotype.  -05/27/2017: Bone marrow biopsy: Markedly hypocellular, 10%; myelofibrosis; bone marrow predominantly fibrotic; a small population of monoclonal CD5 negative, CD10 negative B cells, representing approximately 4% of lymphocytes  -Noncompliant with follow-up  -Not a candidate for allogenic stem cell transplant because of comorbidities and noncompliance  -PRBC x8 between 02/19/2015-12/12/2017  -10/28/2020: Erythropoietin level?370.4  -EGD (11/04/2020) during hospitalization 10/28/2020-11/04/2020 ([facility]), negative; stool for occult blood was positive x3; outpatient colonoscopy was  recommended  -Feraheme 510 mg IV x2 (11/20/2020, 11/27/2020)?(relative iron deficiency)  -Procrit 40,000 subcutaneously weekly, started 11/20/2020  ?   # Chronic anemia, hemoglobin ~8.5 g/dL  -Normocytic  -PRBC x8 (02/19/2015-12/12/2017)  -PRBC x4 units?(10/28/2020-11/04/2020; hemoglobin 6.9)  -EGD (11/04/2020) during hospitalization 10/28/2020-11/04/2020 ([facility]), negative; stool for occult blood was positive x3; outpatient colonoscopy was recommended  -Feraheme 510 mg IV x2 (11/20/2020, 11/27/2020)  -Procrit 40,000 subcutaneously weekly, started 11/20/2020  ?   # Iron deficiency:  -10/28/2020:?Serum iron 8, low. ?TIBC 192, low.? Transferrin saturation 4%, low.? Ferritin 294.35, elevated?(relative iron deficiency)  -Screening colonoscopy 04/27/2015, was normal  -Patient is status post gastric bypass?in 2000  -EGD (11/04/2020) during hospitalization 10/28/2020-11/04/2020 ([facility]), negative; stool for occult blood was positive x3; outpatient colonoscopy was recommended  -Colonoscopy being planned by [facility]  -Feraheme 510 mg IV x2 (11/20/2020, 11/27/2020)  ?   # Leukopenia and neutropenia  -WBC 2.3.? ANC 0.77?(secondary to myelofibrosis)  -Platelet count normal  ?   # History of hepatosplenomegaly secondary to myelofibrosis  -CT A/P (01/20/2016): No megalies with 3.5 cm nodule; retroperitoneal fatty mass adjacent to IVC without change since 03/12/2020; bony sclerosis, consistent with myelofibrosis  -CT A/P (03/19/2020): Hepatosplenomegaly, unchanged since 01/12/2016  ?   # Vitamin B12 deficiency  -B12 level 199 (09/01/2020)  -Iron stores normal (09/01/2020)  -10/28/2020:?Says that he has been taking?vitamin B12 orally, both tubal as well as pills,?14-12 years?after gastric bypass surgery  -10/28/2020: B12 level 1773, elevated?(absorbing satisfactorily via oral route).? Intrinsic factor antibody negative.  ?   # Morbidly obese, status post gastric bypass  surgery?(2000):  -10/28/2020: Weight 322.97 LBS; height 175 cm; BMI 47.84  ?   # Atrial fibrillation;?on anticoagulation with Xarelto  ?  #Alcohol abuse.?  #Noncompliant with follow-ups.  #History of epistaxis, requiring cauterization in the past  #History of gastric bypass?in 2000  #History of back surgery?in [place]?in 2016 or 2017  ?  Plan:  Supportive care?with periodic transfusions?(for symptomatic anemia?or if hemoglobin <7 g/dL)  Erythropoietin level <500  Procrit 40,000 units subcutaneously weekly, started?11/20/2020  Not a candidate for allogenic stem cell transplant because of comorbidities.  ?  Chronically anemic.  PRBC x8 (02/19/2015-12/12/2017)  PRBC x5 (10/28/2020-11/04/2020)  Usual?hemoglobin ~8.5 g/dL  ?  -Relative iron deficiency (labs: 10/28/2020)  -Feraheme?510 mg IV x2 (11/20/2020, 11/27/2020)  -Recheck iron stores 6 weeks post completion of Feraheme  -FIT?test  -Screening colonoscopy 04/27/2015, was normal  -Patient is status post gastric bypass?in 2000  -EGD (11/04/2020): Negative?([facility],?hospitalized?10/28/2020-11/04/2020, symptomatic anemia, heme positive stool)  -Colonoscopy being planned by?[facility]  ?  B12 deficiency was noted on 09/01/2020.  Intrinsic factor antibody negative  Apparently, has been?taking vitamin B12?orally,?chewable as well as pills, for last 10-12 years?after gastric bypass surgery.  -Absorbing satisfactorily via oral route?(labs:?10/28/2020; B12 level?1773)  ?  Check CBC every week?prior to each start of Procrit  ?  -C/W supportive care measures, Hgb stable at 8.9, therefore no need for transfusion today  -C/W Procrit weekly, monitor CBC weekly prior to.  ?  RTC 2 weeks with NP (F2F) with labs: CBC, CMP, Iron profile, ferritin  ?  Discussed POC with patient, all questions answered. Instructed to call clinic for any issues or with any questions PRN, patient verbalizes understanding.  ?   ARMANDO Potter, FNP-C   Problem List/Past Medical  History  Ongoing  Acute urinary retention  Anemia  BPH with urinary obstruction  Coagulopathy  Congestive heart failure  HTN (hypertension)  Hyponatremia  Leg pain  Liver function failure  LV dysfunction  Morbid obesity(  Probable Diagnosis  )  Myelofibrosis  Myofibrosis  Primary myelofibrosis  Historical  Apnea, sleep  Back pain  Cataract  CHF - Congestive heart failure  Hypertension  Nose bleed  Procedure/Surgical History  Transfusion of Nonautologous Red Blood Cells into Peripheral Vein, Percutaneous Approach (12/11/2017)  BONE MARROW ASPIRATION PERFORMED WITH BONE MARROW BIOPSY THROUGH THE SAME INCISION ON THE SAME DATE OF SERVICE (05/25/2017)  Bone marrow; biopsy, needle or trocar (05/25/2017)  Drainage of Bone Marrow, Percutaneous Approach, Diagnostic (05/25/2017)  Extraction of Iliac Bone Marrow, Percutaneous Approach, Diagnostic (05/25/2017)  Drainage of Right Upper Extremity, Percutaneous Approach (10/03/2016)  Drainage of Right Lower Arm Skin, External Approach (10/01/2016)  Incision and drainage of abscess (eg, carbuncle, suppurative hidradenitis, cutaneous or subcutaneous abscess, cyst, furuncle, or paronychia); simple or single (10/01/2016)  Drainage of Bladder with Drainage Device, Via Natural or Artificial Opening (09/25/2016)  Insertion of temporary indwelling bladder catheter; simple (eg, Gordon) (09/25/2016)  Control nasal hemorrhage, anterior, simple (limited cautery and/or packing) any method (02/05/2016)  Packing of Nasal Region using Packing Material (02/05/2016)  Cataract Extraction Phacoemulsification (Right) (08/20/2015)  Extracapsular cataract removal with insertion of intraocular lens prosthesis (1 stage procedure), manual or mechanical technique (eg, irrigation and aspiration or phacoemulsification) (08/20/2015)  Insertion of intraocular lens prosthesis at time of cataract extraction, one-stage (08/20/2015)  Phacoemulsification and aspiration of cataract (08/20/2015)  Cataract Extraction  Phacoemulsification (Left) (06/09/2015)  Extracapsular cataract removal with insertion of intraocular lens prosthesis (1 stage procedure), manual or mechanical technique (eg, irrigation and aspiration or phacoemulsification) (06/09/2015)  Insertion of intraocular lens prosthesis at time of cataract extraction, one-stage (06/09/2015)  Phacoemulsification and aspiration of cataract (06/09/2015)  Colonoscopy (04/27/2015)  Colonoscopy (04/27/2015)  Colonoscopy, flexible; diagnostic, including collection of specimen(s) by brushing or washing, when performed (separate procedure) (04/27/2015)  Transfusion of packed cells (02/23/2015)  Transfusion of packed cells (02/20/2015)  Biopsy Bone Marrow Aspiration (.) (01/08/2014)  Biopsy of bone marrow (01/08/2014)  gastric bypass  hernia repair   Medications  bisacodyl 5 mg ORAL enteric coated tablet, 30 mg= 6 tab(s), Oral, Once  cyanocobalamin 1000 mcg oral tablet, 1000 mcg= 1 tab(s), Oral, Daily, 5 refills  diltiazem 180 mg/24 hours oral CAPsule, extended release, 180 mg= 1 cap(s), Oral, Daily, 3 refills  ferrous sulfate 325 mg oral tablet, 325 mg= 1 tab(s), Oral, Every other day, 3 refills  Flomax 0.4 mg oral capsule, 0.4 mg= 1 cap(s), Oral, Daily, 3 refills  furosemide 40 mg oral tablet, 40 mg= 1 tab(s), Oral, BID, 3 refills  lisinopril 5 mg oral tablet, 5 mg= 1 tab(s), Oral, Daily, 3 refills  magnesium citrate oral liquid, 1 bottle(s)= 300 mL, Oral, Once  magnesium gluconate 500 mg oral tablet, 500 mg= 1 tab(s), Oral, BID  metoprolol tartrate 100 mg oral tablet, 100 mg= 1 tab(s), Oral, BID, 3 refills  Miralax - for colonoscopy prep, 238 gm= 14 packet(s), Oral, Once  Multi-Day Plus Minerals oral tablet, 1 tab(s), Oral, Daily  Pantoprazole 40 mg ORAL EC-Tablet, 40 mg= 1 tab(s), Oral, BID,? ?Not taking  Solumedrol IV push / IM, 125 mg= 2 mL, IV Push, Once  TNF Medl (Template NonFormulary), See Instructions  Toradol 30 mg for IV Push, 30 mg= 1 mL, IV Push, Once  Vitamin D2 2000  intl units oral capsule, 2000 IntUnit= 1 cap(s), Oral, Daily, 3 refills,? ?Not taking: too expensive- takes D3 chewable  Allergies  No Known Allergies  Social History  Abuse/Neglect  No, No, Yes, 12/18/2020  Alcohol - High Risk, 12/04/2014  Past, Beer, 12/15/2020  Employment/School  disability, Work/School description: disabled. Operates hazardous equipment: No., 12/08/2016  Exercise - Does not exercise, 03/06/2015  Self assessment: Fair condition., 12/08/2016  Home/Environment  Lives with Spouse. Living situation: Home/Independent. Alcohol abuse in household: No. Substance abuse in household: No. Smoker in household: No. Injuries/Abuse/Neglect in household: No. Feels unsafe at home: No. Safe place to go: Yes. Family/Friends available for support: Yes. Concern for family members at home: No. Major illness in household: No. Financial concerns: No. TV/Computer concerns: No., 12/08/2016  Nutrition/Health  Caffeine intake amount: NONE. Wants to lose weight: Yes. Sleeping concerns: Yes. Feels highly stressed: No., 11/17/2015  Regular, 03/06/2015  Other  Sexual  Sexually active: Yes., 03/06/2015  Substance Use - Denies Substance Abuse, 12/04/2014  Never, 12/15/2020  Tobacco - Denies Tobacco Use, 12/04/2014  Never (less than 100 in lifetime), No, 12/18/2020  Family History  Alcohol user: Father.  Cataract.: Father.  Hyperlipidemia.: Brother.  Hypertension.: Father.  Tobacco user: Father.  Immunizations  Vaccine Date Status Comments   influenza virus vaccine, inactivated 09/26/2020 Given    influenza virus vaccine, inactivated 10/08/2019 Recorded    influenza virus vaccine, inactivated 10/05/2018 Given tolerated well   influenza virus vaccine, inactivated 12/13/2017 Given Nursing Judgment   influenza virus vaccine, inactivated 09/28/2016 Given    influenza virus vaccine, inactivated 11/17/2015 Given    influenza virus vaccine, inactivated 02/25/2015 Given Med Not Available   pneumococcal 23-polyvalent vaccine 01/05/2014  Given    influenza virus vaccine, inactivated 01/05/2014 Given    Health Maintenance  Health Maintenance  ???Pending?(in the next year)  ??? ??OverDue  ??? ? ? ?Aspirin Therapy for CVD Prevention due??01/04/15??and every 1??year(s)  ??? ? ? ?Alcohol Misuse Screening due??01/02/20??and every 1??year(s)  ??? ? ? ?Influenza Vaccine due??10/01/20??and every 1??day(s)  ??? ??Due?  ??? ? ? ?Medicare Annual Wellness Exam due??12/29/20??and every 1??year(s)  ??? ? ? ?Tetanus Vaccine due??12/29/20??and every 10??year(s)  ??? ? ? ?Zoster Vaccine due??12/29/20??Unknown Frequency  ??? ??Due In Future?  ??? ? ? ?Obesity Screening not due until??01/01/21??and every 1??year(s)  ??? ? ? ?HF-Heart Failure Education not due until??11/04/21??and every 1??year(s)  ??? ? ? ?HF-LVEF not due until??11/04/21??and every 1??year(s)  ??? ? ? ?ADL Screening not due until??12/04/21??and every 1??year(s)  ??? ? ? ?Depression Screening not due until??12/15/21??and every 1??year(s)  ??? ? ? ?Blood Pressure Screening not due until??12/18/21??and every 1??year(s)  ??? ? ? ?Body Mass Index Check not due until??12/18/21??and every 1??year(s)  ??? ? ? ?Hypertension Management-Blood Pressure not due until??12/18/21??and every 1??year(s)  ??? ? ? ?Hypertension Management-BMP not due until??12/18/21??and every 1??year(s)  ???Satisfied?(in the past 1 year)  ??? ??Satisfied?  ??? ? ? ?ADL Screening on??12/04/20.??Satisfied by Yennifer Morris  ??? ? ? ?Blood Pressure Screening on??12/18/20.??Satisfied by Dahiana Benjamin RN  ??? ? ? ?Body Mass Index Check on??12/18/20.??Satisfied by Dahiana Benjamin RN  ??? ? ? ?Colorectal Screening on??12/18/20.??Satisfied by Radha Sosa.  ??? ? ? ?Depression Screening on??12/15/20.??Satisfied by Chelsea Zhou  ??? ? ? ?Diabetes Screening on??12/18/20.??Satisfied by Buck Leigh  ??? ? ? ?Hypertension Management-Blood Pressure on??12/18/20.??Satisfied by Dahiana Benjamin RN  ??? ? ? ?Influenza Vaccine  on??09/26/20.??Satisfied by Meera Eller  ??? ? ? ?Lipid Screening on??02/06/20.??Satisfied by Dagmar Bullock  ??? ? ? ?Obesity Screening on??12/18/20.??Satisfied by Dahiana Benjamin RN  ??? ??Refused?  ??? ? ? ?Influenza Vaccine on??12/04/20.??Recorded by Joann Healy  ?  Lab Results  Test Name Test Result Date/Time   WBC 2.8 x10(3)/mcL (Low) 12/29/2020 13:22 CST   RBC 3.34 x10(6)/mcL (Low) 12/29/2020 13:22 CST   Hgb 8.9 gm/dL (Low) 12/29/2020 13:22 CST   Hct 29.4 % (Low) 12/29/2020 13:22 CST   Platelet 224 x10(3)/mcL 12/29/2020 13:22 CST   MCV 88.0 fL 12/29/2020 13:22 CST   MCH 26.6 pg 12/29/2020 13:22 CST   MCHC 30.3 gm/dL (Low) 12/29/2020 13:22 CST   RDW 20.4 % (High) 12/29/2020 13:22 CST   MPV 9.0 fL 12/29/2020 13:22 CST   Abs Neut 1.16 x10(3)/mcL (Low) 12/29/2020 13:22 CST   Segs Man 59 % 12/29/2020 13:22 CST   Band Man 1 % 12/29/2020 13:22 CST   Lymph Man 26.0 % 12/29/2020 13:22 CST   Monocyte Man 12 % (High) 12/29/2020 13:22 CST   Eos Man 1 % 12/29/2020 13:22 CST   Basophil Man 0 % 12/29/2020 13:22 CST   [place] Man 1 % (High) 12/29/2020 13:22 CST   NRBC Man 1 % 12/29/2020 13:22 CST   Hypochrom 1+ 12/29/2020 13:22 CST   Platelet Est Adequate 12/29/2020 13:22 CST   Anisocyte 1+ 12/29/2020 13:22 CST   Polychrom 1+ 12/29/2020 13:22 CST   RBC Morph Abnormal 12/29/2020 13:22 CST

## 2022-05-03 NOTE — HISTORICAL [facility]
This is a historical note converted from [facility]. Formatting and pictures may have been removed.  Please reference [facility]anti for original formatting and attached multimedia. Chief Complaint  Primary myelofibrosis  History of Present Illness  Problem List:  Primary Myelofibrosis  -Diagnosed 01/08/2014 via BMB  -JAK2 negative  ?   Current Treatment:  -PRN transfusions for Hgb <7 or symptomatic anemia  ?   -Procrit 40,000 units SubQ weekly  -Started: 11/20/2020  ?   Dose due: 6/18/2021  ?   Treatment History:  N/A  ?   HPI/Clinical History:  60-year-old gentleman with history of myelofibrosis, last seen in hematology clinic on 06/08/2017, subsequently lost to follow-up, now referred back to hematology for follow-up by internal medicine.  For full HPI please refer to MD last note dated 12/15/2020. Also refer to assessment and plan section.  ?   Interval History  6/18/2021: Patient?presents for follow up on weekly Procrit; he is scheduled to receive a dose today. He was recently hospitalized after presenting to the ER for dyspnea. VS stable. CMP, CBC stable. H&H 9.0 & 29.8; no transfusion. He complains of right elbow stiffness and pain; he believes he has cellulitis because he had cellulitis in the left elbow when hospitalized. He also complains of feeling more fatigued than usual. Informed him that his Na is still low. Apparently, after I told him to discontinue HCTZ, and after his PCP discontinued it, he was still receiving refills from the pharmacy and was taking it. Emphasized to him that the needs to stop taking HCTZ; instructed him to take the HCTZ he has at home back to the pharmacy for disposal so that he will not able to mistakenly take any. He verbalizes understanding and agrees to do so. Will continue weekly CBC and Procrit and have him follow up in 3 weeks with labs. He is amenable to this plan.  Review of Systems  A complete 12-point?ROS was performed in full with pertinent positives as described in interval  history. Remainder of ROS done in full and are negative.  Physical Exam  Vitals & Measurements  T:?36.9? ?C (Oral)? HR:?76(Peripheral)? RR:?22? BP:?120/71?  HT:?175.00?cm? WT:?149.100?kg? BMI:?48.69?  General: ?Alert and oriented, No acute distress.??  Appearance: Well-groomed; morbid obesity. Wearing face mask.  HEENT: ?Normocephalic, Oral mucosa is moist.?Pupils are equal, round and reactive to light, Extraocular movements are intact, Normal conjunctiva.?  Neck: ?Supple, Non-tender, No lymphadenopathy, No thyromegaly.??  Respiratory: ?Lungs are clear to auscultation, Respirations are non-labored, Breath sounds are equal, Symmetrical chest wall expansion.??  Cardiovascular: ?Normal rate, Regular rhythm, No edema.??  Breast:??Breast exam not performed on todays visit.??  Gastrointestinal: Rounded,?Soft, Non-tender, Non-distended, Normal bowel sounds.??  Musculoskeletal: ?Normal strength.?Decreased activity tolerance. Gets winded and has to take breask when walking around the corner to Infusion.  Integumentary: ?Warm, dry, intact.??  Neurologic: ?Alert, Oriented, No focal deficits, Cranial Nerves II-XII are grossly intact.??  Cognition and Speech: ?Oriented, Speech clear and coherent.??  Psychiatric: ?Cooperative, Appropriate mood & affect. ?  ECOG Performance Scale: 2 - Capable of all self-care but unable to carry out any work activities. Up and about greater than 50 percent of waking hours.?  Assessment/Plan  1.?Primary myelofibrosis?D47.1  # Primary myelofibrosis, diagnosed on bone marrow biopsy (01/08/2014).? JAK2 mutation negative.? Extensive osteosclerosis and fibrosis.? Cellularity 10%.? Flow cytometry with <3% bone marrow blasts.? Normal immunophenotype.  -05/27/2017: Bone marrow biopsy: Markedly hypocellular, 10%; myelofibrosis; bone marrow predominantly fibrotic; a small population of monoclonal CD5 negative, CD10 negative B cells, representing approximately 4% of lymphocytes  -Noncompliant with  follow-up  -Not a candidate for allogenic stem cell transplant because of comorbidities and noncompliance  -PRBC x8 between 02/19/2015-12/12/2017  -10/28/2020: Erythropoietin level?370.4  -EGD (11/04/2020) during hospitalization 10/28/2020-11/04/2020 ([facility]), negative; stool for occult blood was positive x3; outpatient colonoscopy was recommended  -Feraheme 510 mg IV x2 (11/20/2020, 11/27/2020)?(relative iron deficiency)  -Procrit 40,000 subcutaneously weekly, started 11/20/2020  ?   # ???Chronic anemia, hemoglobin ~8.5 g/dL  -Normocytic  -PRBC x8 (02/19/2015-12/12/2017)  -PRBC x4 units?(10/28/2020-11/04/2020; hemoglobin 6.9)  -EGD (11/04/2020) during hospitalization 10/28/2020-11/04/2020 ([facility]), negative; stool for occult blood was positive x3; outpatient colonoscopy was recommended  -Feraheme 510 mg IV x2 (11/20/2020, 11/27/2020)  -Procrit 40,000 subcutaneously weekly, started 11/20/2020  ?   # ???Iron deficiency:  -10/28/2020:?Serum iron 8, low. ?TIBC 192, low.? Transferrin saturation 4%, low.? Ferritin 294.35, elevated?(relative iron deficiency)  -Screening colonoscopy 04/27/2015, was normal  -Patient is status post gastric bypass?in 2000  -EGD (11/04/2020) during hospitalization 10/28/2020-11/04/2020 ([facility]), negative; stool for occult blood was positive x3; outpatient colonoscopy was recommended  -Colonoscopy being planned by [facility]  -Feraheme 510 mg IV x2 (11/20/2020, 11/27/2020)  ?   # ???Leukopenia and neutropenia  -WBC 2.3.? ANC 0.77?(secondary to myelofibrosis)  -Platelet count normal  ?   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   # ???Vitamin B12 deficiency  -B12 level 199  (09/01/2020)  -Iron stores normal (09/01/2020)  -10/28/2020:?Says that he has been taking?vitamin B12 orally, both tubal as well as pills,?14-12 years?after gastric bypass surgery  -10/28/2020: B12 level 1773, elevated?(absorbing satisfactorily via oral route).? Intrinsic factor antibody negative.  ?   # ???Morbidly obese, status post gastric bypass surgery?(2000):  -10/28/2020: Weight 322.97 LBS; height 175 cm; BMI 47.84  ?   # ???Atrial fibrillation;?on anticoagulation with Xarelto  ?  #Alcohol abuse.?  #Noncompliant with follow-ups.  #History of epistaxis, requiring cauterization in the past  #History of gastric bypass?in 2000  #History of back surgery?in [place]?in 2016 or 2017  ?  Plan:  Supportive care?with periodic transfusions?(for symptomatic anemia?or if hemoglobin <7 g/dL)  Erythropoietin level <500  Procrit 40,000 units subcutaneously weekly, started?11/20/2020  Not a candidate for allogenic stem cell transplant because of comorbidities.  ?  Chronically anemic.  PRBC x8 (02/19/2015-12/12/2017)  PRBC x5 (10/28/2020-11/04/2020)  Usual?hemoglobin ~8.5 g/dL  ?  -Relative iron deficiency (labs: 10/28/2020)  -Feraheme?510 mg IV x2 (11/20/2020, 11/27/2020)  -Recheck iron stores 6 weeks post completion of Feraheme  -FIT?test  -Screening colonoscopy 04/27/2015, was normal  -Patient is status post gastric bypass?in 2000  -EGD (11/04/2020): Negative?([facility],?hospitalized?10/28/2020-11/04/2020, symptomatic anemia, heme positive stool)  -Colonoscopy being planned by?[facility]  ?  B12 deficiency was noted on 09/01/2020.  Intrinsic factor antibody negative  Apparently, has been?taking vitamin B12?orally,?chewable as well as pills, for last 10-12 years?after gastric bypass surgery.  -Absorbing satisfactorily via oral route?(labs:?10/28/2020; B12 level?1773)  ?  Check CBC every week?prior to each start of Procrit  ?  Continue supportive care measures.  Check CBC on 6/25/2021 & 7/2/2021?to be reviewed  by infusion nurse.  If Hgb <7, transfuse 2U PRBCs leukoreduced. No transfusion indicated for Hgb?9.0 today.  Continue Procrit weekly & weekly CBC monitoring.  Follow up in?3 weeks (F2F on 7/9/2021) with CBC, CMP.  Ordered:  ?   Problem List/Past Medical History  Ongoing  Acute urinary retention  Anemia  Atrial fibrillation  BPH with urinary obstruction  Coagulopathy  Congestive heart failure  HTN (hypertension)  Hyponatremia  Leg pain  Liver function failure  LV dysfunction  Morbid obesity(  Probable Diagnosis  )  Myelofibrosis  Myofibrosis  Primary myelofibrosis  Primary myelofibrosis  Historical  Apnea, sleep  Back pain  Cataract  CHF - Congestive heart failure  Hypertension  Nose bleed  Procedure/Surgical History  Transfusion of Nonautologous Red Blood Cells into Peripheral Vein, Percutaneous Approach (12/11/2017)  BONE MARROW ASPIRATION PERFORMED WITH BONE MARROW BIOPSY THROUGH THE SAME INCISION ON THE SAME DATE OF SERVICE (05/25/2017)  Bone marrow; biopsy, needle or trocar (05/25/2017)  Drainage of Bone Marrow, Percutaneous Approach, Diagnostic (05/25/2017)  Extraction of Iliac Bone Marrow, Percutaneous Approach, Diagnostic (05/25/2017)  Drainage of Right Upper Extremity, Percutaneous Approach (10/03/2016)  Drainage of Right Lower Arm Skin, External Approach (10/01/2016)  Incision and drainage of abscess (eg, carbuncle, suppurative hidradenitis, cutaneous or subcutaneous abscess, cyst, furuncle, or paronychia); simple or single (10/01/2016)  Drainage of Bladder with Drainage Device, Via Natural or Artificial Opening (09/25/2016)  Insertion of temporary indwelling bladder catheter; simple (eg, Gordon) (09/25/2016)  Control nasal hemorrhage, anterior, simple (limited cautery and/or packing) any method (02/05/2016)  Packing of Nasal Region using Packing Material (02/05/2016)  Cataract Extraction Phacoemulsification (Right) (08/20/2015)  Extracapsular cataract removal with insertion of intraocular lens prosthesis  (1 stage procedure), manual or mechanical technique (eg, irrigation and aspiration or phacoemulsification) (08/20/2015)  Insertion of intraocular lens prosthesis at time of cataract extraction, one-stage (08/20/2015)  Phacoemulsification and aspiration of cataract (08/20/2015)  Cataract Extraction Phacoemulsification (Left) (06/09/2015)  Extracapsular cataract removal with insertion of intraocular lens prosthesis (1 stage procedure), manual or mechanical technique (eg, irrigation and aspiration or phacoemulsification) (06/09/2015)  Insertion of intraocular lens prosthesis at time of cataract extraction, one-stage (06/09/2015)  Phacoemulsification and aspiration of cataract (06/09/2015)  Colonoscopy (04/27/2015)  Colonoscopy (04/27/2015)  Colonoscopy, flexible; diagnostic, including collection of specimen(s) by brushing or washing, when performed (separate procedure) (04/27/2015)  Transfusion of packed cells (02/23/2015)  Transfusion of packed cells (02/20/2015)  Biopsy Bone Marrow Aspiration (.) (01/08/2014)  Biopsy of bone marrow (01/08/2014)  gastric bypass  hernia repair   Medications  albuterol-ipratropium 100 mcg-20 mcg/inh inhalation aerosol, 1 puff(s), INH, QID, 5 refills  allopurinol 100 mg oral tablet, 100 mg= 1 tab(s), Oral, Daily, 4 refills  amlodipine 5 mg oral tablet, 5 mg= 1 tab(s), Oral, Daily, 3 refills  aspirin 325 mg oral Delayed Release (EC) tablet, 325 mg= 1 tab(s), Oral, Daily, 6 refills  bisacodyl 5 mg ORAL enteric coated tablet, 30 mg= 6 tab(s), Oral, Once  calcium carbonate 600 mg oral tablet, 600 mg= 1 tab(s), Oral, Daily  cyanocobalamin 1000 mcg oral tablet, 1000 mcg= 1 tab(s), Oral, Daily, 5 refills  diltiazem 180 mg/24 hours oral CAPsule, extended release, 180 mg= 1 cap(s), Oral, Daily, 5 refills  Flomax 0.4 mg oral capsule, 0.4 mg= 1 cap(s), Oral, Daily, 4 refills  Levaquin 750 mg oral tablet, 750 mg= 1 tab(s), Oral, q24hr,? ?Not taking  magnesium citrate oral liquid, 1 bottle(s)= 300  mL, Oral, Once  magnesium oxide 400 mg oral tablet, 400 mg= 1 tab(s), Oral, Daily  metoprolol tartrate 100 mg oral tablet, 100 mg= 1 tab(s), Oral, BID, 4 refills  Miralax - for colonoscopy prep, 238 gm= 14 packet(s), Oral, Once  Multi-Day Plus Minerals oral tablet, 1 tab(s), Oral, Daily  Nebulizer Machine, See Instructions,? ?Not taking  Nebulizer Tubing Kit, See Instructions, 5 refills,? ?Not taking  Retacrit, 87642 units= 1 mL, Subcutaneous, Once  Retacrit 4000 units/mL preservative-free injectable solution, See Instructions  Solumedrol IV push / IM, 125 mg= 2 mL, IV Push, Once  Toradol 30 mg for IV Push, 30 mg= 1 mL, IV Push, Once  torsemide 20 mg oral tablet, 20 mg= 1 tab(s), Oral, Daily, 1 refills  Vitamin D2 2000 intl units oral capsule, 2000 IntUnit= 1 cap(s), Oral, Daily, 3 refills  Allergies  vancomycin  Social History  Abuse/Neglect  No, No, Yes, 06/18/2021  Alcohol - High Risk, 12/04/2014  Current, Wine, 06/18/2021  Employment/School  disability, Work/School description: disabled. Operates hazardous equipment: No., 12/08/2016  Exercise - Does not exercise, 03/06/2015  Self assessment: Fair condition., 12/08/2016  Home/Environment  Lives with Spouse. Living situation: Home/Independent. Alcohol abuse in household: No. Substance abuse in household: No. Smoker in household: No. Injuries/Abuse/Neglect in household: No. Feels unsafe at home: No. Safe place to go: Yes. Family/Friends available for support: Yes. Concern for family members at home: No. Major illness in household: No. Financial concerns: No. TV/Computer concerns: No., 12/08/2016  Nutrition/Health  Caffeine intake amount: NONE. Wants to lose weight: Yes. Sleeping concerns: Yes. Feels highly stressed: No., 11/17/2015  Regular, 03/06/2015  Other  Sexual  Gender Identity Identifies as male., 03/01/2021  Sexually active: Yes., 03/06/2015  Substance Use - Denies Substance Abuse, 12/04/2014  Never, 06/18/2021  Tobacco - Denies Tobacco Use,  12/04/2014  Never (less than 100 in lifetime), No, 06/18/2021  Family History  Alcohol user: Father.  Cataract.: Father.  Hyperlipidemia.: Brother.  Hypertension.: Father.  Tobacco user: Father.  Immunizations  Vaccine Date Status Comments   COVID-19 MRNA, LNP-S, PF- Pfizer 04/30/2021 Recorded    influenza virus vaccine, inactivated 09/26/2020 Given    influenza virus vaccine, inactivated 10/08/2019 Recorded    influenza virus vaccine, inactivated 10/05/2018 Given tolerated well   influenza virus vaccine, inactivated 12/13/2017 Given Nursing Judgment   influenza virus vaccine, inactivated 10/05/2017 Recorded    influenza virus vaccine, inactivated 09/28/2016 Given    influenza virus vaccine, inactivated 11/17/2015 Given    influenza virus vaccine, inactivated 02/25/2015 Given Med Not Available   pneumococcal 23-polyvalent vaccine 01/05/2014 Given    influenza virus vaccine, inactivated 01/05/2014 Given    Health Maintenance  Health Maintenance  ???Pending?(in the next year)  ??? ??OverDue  ??? ? ? ?Alcohol Misuse Screening due??01/02/21??and every 1??year(s)  ??? ??Due?  ??? ? ? ?Tetanus Vaccine due??06/18/21??and every 10??year(s)  ??? ? ? ?Zoster Vaccine due??06/18/21??Unknown Frequency  ??? ??Due In Future?  ??? ? ? ?Obesity Screening not due until??01/01/22??and every 1??year(s)  ??? ? ? ?ADL Screening not due until??05/19/22??and every 1??year(s)  ??? ? ? ?Medicare Annual Wellness Exam not due until??05/20/22??and every 1??year(s)  ??? ? ? ?HF-LVEF not due until??06/09/22??and every 1??year(s)  ??? ? ? ?HF-Heart Failure Education not due until??06/12/22??and every 1??year(s)  ??? ? ? ?Aspirin Therapy for CVD Prevention not due until??06/12/22??and every 1??year(s)  ???Satisfied?(in the past 1 year)  ??? ??Satisfied?  ??? ? ? ?ADL Screening on??05/19/21.??Satisfied by Livia Francois LPN  ??? ? ? ?Aspirin Therapy for CVD Prevention on??06/12/21.??Satisfied by Ritika Shafer RN  ??? ? ? ?Blood Pressure Screening  on??06/18/21.??Satisfied by Chelsea Zhou  ??? ? ? ?Body Mass Index Check on??06/18/21.??Satisfied by Chelsea Zhou  ??? ? ? ?Colorectal Screening on??12/18/20.??Satisfied by Radha Sosa  ??? ? ? ?Coronary Artery Disease Maintenance-Antiplatelet Agent Prescribed on??02/19/21.??Satisfied by Elan RODNEY, Codey GARCIA  ??? ? ? ?Depression Screening on??06/18/21.??Satisfied by Chelsea Zhou  ??? ? ? ?Diabetes Screening on??06/18/21.??Satisfied by Nenita Kiran  ??? ? ? ?Hypertension Management-Blood Pressure on??06/18/21.??Satisfied by Chelsea Zhou  ??? ? ? ?Influenza Vaccine on??01/05/21.??Satisfied by Jessi Castro  ??? ? ? ?Lipid Screening on??06/10/21.??Satisfied by Sherley Amador  ??? ? ? ?Medicare Annual Wellness Exam on??05/20/21.??Satisfied by Rufus Paiz MD  ??? ? ? ?Obesity Screening on??06/18/21.??Satisfied by Chelsea Zhou  ??? ??Refused?  ??? ? ? ?Influenza Vaccine on??12/04/20.??Recorded by Joann Healy  ?  Lab Results  Test Name Test Result Date/Time   Sodium Lvl 134 mmol/L (Low) 06/18/2021 09:27 CDT   Potassium Lvl 4.7 mmol/L 06/18/2021 09:27 CDT   Chloride 93 mmol/L (Low) 06/18/2021 09:27 CDT   CO2 32 mmol/L (High) 06/18/2021 09:27 CDT   Calcium Lvl 8.8 mg/dL 06/18/2021 09:27 CDT   Glucose Lvl 99 mg/dL 06/18/2021 09:27 CDT   BUN 26.1 mg/dL (High) 06/18/2021 09:27 CDT   Creatinine 1.04 mg/dL 06/18/2021 09:27 CDT   Est Creat Clearance Ser 76.22 mL/min 06/18/2021 10:36 CDT   eGFR-AA 94 06/18/2021 09:27 CDT   eGFR-DAVID 78 mL/min/1.73 m2 (Low) 06/18/2021 09:27 CDT   Bili Total 0.6 mg/dL 06/18/2021 09:27 CDT   Bili Direct 0.2 mg/dL 06/18/2021 09:27 CDT   Bili Indirect 0.40 mg/dL 06/18/2021 09:27 CDT   AST 22 unit/L 06/18/2021 09:27 CDT   ALT 9 unit/L 06/18/2021 09:27 CDT   Alk Phos 78 unit/L 06/18/2021 09:27 CDT   Total Protein 6.3 gm/dL (Low) 06/18/2021 09:27 CDT   Albumin Lvl 3.3 gm/dL (Low) 06/18/2021 09:27 CDT   Globulin 3.0 gm/dL 06/18/2021 09:27 CDT   A/G Ratio  1.1 ratio 06/18/2021 09:27 CDT   WBC 2.7 x10(3)/mcL (Low) 06/18/2021 09:27 CDT   RBC 3.43 x10(6)/mcL (Low) 06/18/2021 09:27 CDT   Hgb 9.0 gm/dL (Low) 06/18/2021 09:27 CDT   Hct 29.8 % (Low) 06/18/2021 09:27 CDT   Platelet 178 x10(3)/mcL 06/18/2021 09:27 CDT   MCV 86.9 fL 06/18/2021 09:27 CDT   MCH 26.2 pg 06/18/2021 09:27 CDT   MCHC 30.2 gm/dL (Low) 06/18/2021 09:27 CDT   RDW 18.5 % (High) 06/18/2021 09:27 CDT   MPV 9.2 fL 06/18/2021 09:27 CDT   Abs Neut 0.99 x10(3)/mcL (Low) 06/18/2021 09:27 CDT   Neutro Auto 37 % 06/18/2021 09:27 CDT   Lymph Auto 30 % 06/18/2021 09:27 CDT   Mono Auto 16 % 06/18/2021 09:27 CDT   Eos Auto 0 % 06/18/2021 09:27 CDT   Abs Eos 0.0 x10(3)/mcL 06/18/2021 09:27 CDT   Basophil Auto 2 % 06/18/2021 09:27 CDT   Abs Neutro 0.99 x10(3)/mcL (Low) 06/18/2021 09:27 CDT   Abs Lymph 0.8 x10(3)/mcL 06/18/2021 09:27 CDT   Abs Mono 0.4 x10(3)/mcL 06/18/2021 09:27 CDT   Abs Baso 0.1 x10(3)/mcL 06/18/2021 09:27 CDT   NRBC% 11.1 % 06/18/2021 09:27 CDT   IG% 15 % 06/18/2021 09:27 CDT   IG# 0.400 06/18/2021 09:27 CDT

## 2022-05-03 NOTE — HISTORICAL [facility] CERNER
This is a historical note converted from [employer]. Formatting and pictures may have been removed.  Please reference [employer] for original formatting and attached multimedia. Chief Complaint  Check up. Medication refill.  History of Present Illness  56 yo wm with pancytopenia, myelofibrosis, htn  Review of Systems  ros as documented in hpi  Physical Exam  Vitals & Measurements  T:?36.7? ?C ?(Oral)? HT:?163?cm? HT:?163?cm? WT:?142.8?kg? WT:?142.8?kg? BMI:?53.75?  aax4 nad  jessica eomi  cv rrr s1s2 no mgr  lungs cta no cr or whz  abd nt soft  ext no edema  neuro intact  Assessment/Plan  1.?Myelofibrosis  ?cbc same  Ordered:  CBC w/ Auto Diff, Routine collect, *Est. 01/20/18 3:00:00 CST, Blood, Order for future visit, *Est. Stop date 01/20/18 3:00:00 CST, Lab Collect, Myelofibrosis  HTN (hypertension), 6, month(s), In Approximately, 07/20/17 13:11:00 CDT  Clinic Follow up, *Est. 01/20/18 3:00:00 CST, Order for future visit, Myelofibrosis  HTN (hypertension), [facility]  Comprehensive Metabolic Panel, Routine collect, *Est. 01/20/18 3:00:00 CST, Blood, Order for future visit, *Est. Stop date 01/20/18 3:00:00 CST, Lab Collect, Myelofibrosis  HTN (hypertension), 6, month(s), In Approximately, 07/20/17 13:11:00 CDT  Hemoglobin A1C [facility], Routine collect, *Est. 01/20/18 3:00:00 CST, Blood, Order for future visit, *Est. Stop date 01/20/18 3:00:00 CST, Lab Collect, Myelofibrosis  HTN (hypertension), 6, month(s), In Approximately, 07/20/17 13:11:00 CDT  Lipid Panel, Routine collect, *Est. 01/20/18 3:00:00 CST, Blood, Order for future visit, *Est. Stop date 01/20/18 3:00:00 CST, Lab Collect, Myelofibrosis  HTN (hypertension), 6, month(s), In Approximately, 07/20/17 13:11:00 CDT  Occult Blood Stool #2 Screening, Routine collect, *Est. 01/20/18 3:00:00 CST, Stool, Order for future visit, *Est. Stop date 01/20/18 3:00:00 CST, Nurse collect, Myelofibrosis  HTN (hypertension), day(s), 6, month(s), In Approximately, 07/20/17 13:11:00  CDT  Occult Blood Stool #3 Screening, Routine collect, *Est. 01/20/18 3:00:00 CST, Stool, Order for future visit, *Est. Stop date 01/20/18 3:00:00 CST, Nurse collect, Myelofibrosis  HTN (hypertension), day(s), 6, month(s), In Approximately, 07/20/17 13:11:00 CDT  Occult Blood Stool Screening Test, Routine collect, *Est. 01/20/18 3:00:00 CST, Stool, Order for future visit, *Est. Stop date 01/20/18 3:00:00 CST, Nurse collect, Myelofibrosis  HTN (hypertension), day(s), 6, month(s), In Approximately, 07/20/17 13:11:00 CDT  Office/Outpatient Visit Level 4 Established [id number] PC, Myelofibrosis  HTN (hypertension), [facility], 07/20/17 13:11:00 CDT  Thyroid Stimulating Hormone, Routine collect, *Est. 01/20/18 3:00:00 CST, Blood, Order for future visit, *Est. Stop date 01/20/18 3:00:00 CST, Lab Collect, Myelofibrosis  HTN (hypertension), 6, month(s), In Approximately, 07/20/17 13:11:00 CDT  ?  2.?HTN (hypertension)  ?controlled  Ordered:  CBC w/ Auto Diff, Routine collect, *Est. 01/20/18 3:00:00 CST, Blood, Order for future visit, *Est. Stop date 01/20/18 3:00:00 CST, Lab Collect, Myelofibrosis  HTN (hypertension), 6, month(s), In Approximately, 07/20/17 13:11:00 CDT  Clinic Follow up, *Est. 01/20/18 3:00:00 CST, Order for future visit, Myelofibrosis  HTN (hypertension), [facility]  Comprehensive Metabolic Panel, Routine collect, *Est. 01/20/18 3:00:00 CST, Blood, Order for future visit, *Est. Stop date 01/20/18 3:00:00 CST, Lab Collect, Myelofibrosis  HTN (hypertension), 6, month(s), In Approximately, 07/20/17 13:11:00 CDT  Hemoglobin A1C [facility], Routine collect, *Est. 01/20/18 3:00:00 CST, Blood, Order for future visit, *Est. Stop date 01/20/18 3:00:00 CST, Lab Collect, Myelofibrosis  HTN (hypertension), 6, month(s), In Approximately, 07/20/17 13:11:00 CDT  Lipid Panel, Routine collect, *Est. 01/20/18 3:00:00 CST, Blood, Order for future visit, *Est. Stop date 01/20/18 3:00:00 CST, Lab Collect, Myelofibrosis   HTN (hypertension), 6, month(s), In Approximately, 07/20/17 13:11:00 CDT  Occult Blood Stool #2 Screening, Routine collect, *Est. 01/20/18 3:00:00 CST, Stool, Order for future visit, *Est. Stop date 01/20/18 3:00:00 CST, Nurse collect, Myelofibrosis  HTN (hypertension), day(s), 6, month(s), In Approximately, 07/20/17 13:11:00 CDT  Occult Blood Stool #3 Screening, Routine collect, *Est. 01/20/18 3:00:00 CST, Stool, Order for future visit, *Est. Stop date 01/20/18 3:00:00 CST, Nurse collect, Myelofibrosis  HTN (hypertension), day(s), 6, month(s), In Approximately, 07/20/17 13:11:00 CDT  Occult Blood Stool Screening Test, Routine collect, *Est. 01/20/18 3:00:00 CST, Stool, Order for future visit, *Est. Stop date 01/20/18 3:00:00 CST, Nurse collect, Myelofibrosis  HTN (hypertension), day(s), 6, month(s), In Approximately, 07/20/17 13:11:00 CDT  Office/Outpatient Visit Level 4 Established [id number] PC, Myelofibrosis  HTN (hypertension), [facility], 07/20/17 13:11:00 CDT  Thyroid Stimulating Hormone, Routine collect, *Est. 01/20/18 3:00:00 CST, Blood, Order for future visit, *Est. Stop date 01/20/18 3:00:00 CST, Lab Collect, Myelofibrosis  HTN (hypertension), 6, month(s), In Approximately, 07/20/17 13:11:00 CDT  ?  Orders:  Peripheral Smear Review, Routine collect, 07/20/17 7:49:00 CDT, Blood, Collected, by CHARU, Stop date 07/20/17 7:49:00 CDT, Lab Collect, Venous Draw, Print Label By Order Location, 07/20/17 7:38:00 CDT   Problem List/Past Medical History  Acute urinary retention  Anemia  BPH with urinary obstruction  Cataract  Coagulopathy  Congestive heart failure  HTN (hypertension)  Hyponatremia  Leg pain  Liver function failure  LV dysfunction  Morbid obesity(  Probable Diagnosis  )  Myelofibrosis  Myofibrosis  Nose bleed  Primary myelofibrosis  Historical  Apnea, sleep  CHF - Congestive heart failure  Procedure/Surgical History  BONE MARROW ASPIRATION PERFORMED WITH BONE MARROW BIOPSY THROUGH THE SAME  INCISION ON THE SAME DATE OF SERVICE (05/25/2017), Bone marrow; biopsy, needle or trocar (05/25/2017), Drainage of Bone Marrow, Percutaneous Approach, Diagnostic (05/25/2017), Extraction of Iliac Bone Marrow, Percutaneous Approach, Diagnostic (05/25/2017), Drainage of Right Upper Extremity, Percutaneous Approach (10/03/2016), Drainage of Right Lower Arm Skin, External Approach (10/01/2016), Incision and drainage of abscess (eg, carbuncle, suppurative hidradenitis, cutaneous or subcutaneous abscess, cyst, furuncle, or paronychia); simple or single (10/01/2016), Drainage of Bladder with Drainage Device, Via Natural or Artificial Opening (09/25/2016), Insertion of temporary indwelling bladder catheter; simple (eg, Gordon) (09/25/2016), Control nasal hemorrhage, anterior, simple (limited cautery and/or packing) any method (02/05/2016), Packing of Nasal Region using Packing Material (02/05/2016), Cataract Extraction Phacoemulsification (Right) (08/20/2015), Extracapsular cataract removal with insertion of intraocular lens prosthesis (1 stage procedure), manual or mechanical technique (eg, irrigation and aspiration or phacoemulsification) (08/20/2015), Insertion of intraocular lens prosthesis at time of cataract extraction, one-stage (08/20/2015), Phacoemulsification and aspiration of cataract (08/20/2015), Cataract Extraction Phacoemulsification (Left) (06/09/2015), Extracapsular cataract removal with insertion of intraocular lens prosthesis (1 stage procedure), manual or mechanical technique (eg, irrigation and aspiration or phacoemulsification) (06/09/2015), Insertion of intraocular lens prosthesis at time of cataract extraction, one-stage (06/09/2015), Phacoemulsification and aspiration of cataract (06/09/2015), Colonoscopy (04/27/2015), Colonoscopy (04/27/2015), Colonoscopy, flexible; diagnostic, including collection of specimen(s) by brushing or washing, when performed (separate procedure) (04/27/2015), Transfusion  of packed cells (02/23/2015), Transfusion of packed cells (02/20/2015), Biopsy Bone Marrow Aspiration (.) (01/08/2014), Biopsy of bone marrow (01/08/2014), Gastric bypass operation (2004), gastric bypass, hernia repair.  Medications  bisacodyl 5 mg ORAL enteric coated tablet, 30 mg, 6 tab(s), Oral, Once  Flomax 0.4 mg oral capsule, 0.4 mg, 1 cap(s), Oral, Daily  Lasix 40 mg oral tablet, 1 .5tab(s), Oral, BID  lisinopril 5 mg oral tablet, 5 mg, 1 tab(s), Oral, Daily  magnesium citrate oral liquid, 1 bottle(s), 300 mL, Oral, Once  metoprolol tartrate 100 mg oral tablet, 100 mg, 1 tab(s), Oral, BID  Miralax - for colonoscopy prep, 238 gm, 14 packet(s), Oral, Once  Solumedrol IV push / IM, 125 mg, 2 mL, IV Push, Once  Toradol 30 mg for IV Push, 30 mg, 1 mL, IV Push, Once  Vitamin D  Allergies  No Known Allergies  Social History  Alcohol - High Risk  Current, Beer, Daily, 5 drinks/episode average. 9.00 drinks/episode maximum.  Current, Beer, Daily  Employment/School  disability, Work/School description: disabled. Operates hazardous equipment: No.  Exercise - Does not exercise  Self assessment: Fair condition.  Home/Environment  Lives with Spouse. Living situation: Home/Independent. Alcohol abuse in household: No. Substance abuse in household: No. Smoker in household: No. Injuries/Abuse/Neglect in household: No. Feels unsafe at home: No. Safe place to go: Yes. Family/Friends available for support: Yes. Concern for family members at home: No. Major illness in household: No. Financial concerns: No. TV/Computer concerns: No.  Nutrition/Health  Caffeine intake amount: NONE. Wants to lose weight: Yes. Sleeping concerns: Yes. Feels highly stressed: No.  Regular  Other  Sexual  Sexually active: Yes.  Substance Abuse - Denies Substance Abuse  Never  Tobacco - Denies Tobacco Use  Never smoker  Family History  Alcohol user: Father.  Cataract.: Father.  Hyperlipidemia.: Brother.  Hypertension.: Father.  Tobacco user: Father.

## 2022-05-03 NOTE — HISTORICAL [facility] CERNER
This is a historical note converted from [employer]. Formatting and pictures may have been removed.  Please reference [employer] for original formatting and attached multimedia. 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  Prognosis is poor.  Given his?multiple comorbidities,?high-dose therapy?and stem cell rescue, appear to be out of question.  Will refer him to?BMT, [place]?for consideration of chemotherapy.  Stop?Procrit.  Transfuse as needed to keep hemoglobin >7 g/dL; also, transfuse for symptoms.? All blood units?will be leukopoor?and irradiated.  ?  For other details, please refer to?detailed office visit note?from today.   Problem List/Past Medical History  Ongoing  Acute urinary retention  Anemia  Atrial fibrillation  BPH with urinary obstruction  Coagulopathy  Congestive heart failure  HTN (hypertension)  Hyponatremia  Leg pain  Liver function failure  LV dysfunction  Morbid obesity(  Probable Diagnosis  )  Myelofibrosis  Myofibrosis  Primary myelofibrosis  Primary myelofibrosis  Historical  Apnea, sleep  Back pain  Cataract  CHF - Congestive heart failure  Hypertension  Nose bleed  Procedure/Surgical History  Fluoroscopy of Multiple Coronary Arteries using Low Osmolar Contrast (06/28/2021)  Fluoroscopy of Thoracic Aorta using Low Osmolar Contrast (06/28/2021)  Measurement of Cardiac Sampling and Pressure, Left Heart, Percutaneous Approach (06/28/2021)  Transfusion of Nonautologous Red Blood Cells into Peripheral Vein, Percutaneous Approach (12/11/2017)  BONE MARROW ASPIRATION PERFORMED WITH BONE MARROW BIOPSY THROUGH THE SAME INCISION ON THE SAME DATE OF SERVICE (05/25/2017)  Bone marrow; biopsy, needle or trocar (05/25/2017)  Drainage of Bone Marrow, Percutaneous Approach, Diagnostic (05/25/2017)  Extraction of Iliac Bone Marrow, Percutaneous Approach, Diagnostic (05/25/2017)  Drainage of Right Upper Extremity, Percutaneous Approach  (10/03/2016)  Drainage of Right Lower Arm Skin, External Approach (10/01/2016)  Incision and drainage of abscess (eg, carbuncle, suppurative hidradenitis, cutaneous or subcutaneous abscess, cyst, furuncle, or paronychia); simple or single (10/01/2016)  Drainage of Bladder with Drainage Device, Via Natural or Artificial Opening (09/25/2016)  Insertion of temporary indwelling bladder catheter; simple (eg, Gordon) (09/25/2016)  Control nasal hemorrhage, anterior, simple (limited cautery and/or packing) any method (02/05/2016)  Packing of Nasal Region using Packing Material (02/05/2016)  Cataract Extraction Phacoemulsification (Right) (08/20/2015)  Extracapsular cataract removal with insertion of intraocular lens prosthesis (1 stage procedure), manual or mechanical technique (eg, irrigation and aspiration or phacoemulsification) (08/20/2015)  Insertion of intraocular lens prosthesis at time of cataract extraction, one-stage (08/20/2015)  Phacoemulsification and aspiration of cataract (08/20/2015)  Cataract Extraction Phacoemulsification (Left) (06/09/2015)  Extracapsular cataract removal with insertion of intraocular lens prosthesis (1 stage procedure), manual or mechanical technique (eg, irrigation and aspiration or phacoemulsification) (06/09/2015)  Insertion of intraocular lens prosthesis at time of cataract extraction, one-stage (06/09/2015)  Phacoemulsification and aspiration of cataract (06/09/2015)  Colonoscopy (04/27/2015)  Colonoscopy (04/27/2015)  Colonoscopy, flexible; diagnostic, including collection of specimen(s) by brushing or washing, when performed (separate procedure) (04/27/2015)  Transfusion of packed cells (02/23/2015)  Transfusion of packed cells (02/20/2015)  Biopsy Bone Marrow Aspiration (.) (01/08/2014)  Biopsy of bone marrow (01/08/2014)  gastric bypass  hernia repair   Medications  albuterol-ipratropium 100 mcg-20 mcg/inh inhalation aerosol, 1 puff(s), INH, QID, 5 refills,? ?Unable to obtain:  PT STATED Last Dose Date/Time Unknown  allopurinol 100 mg oral tablet, 100 mg= 1 tab(s), Oral, Daily, 4 refills  amlodipine 5 mg oral tablet, 5 mg= 1 tab(s), Oral, Daily, 3 refills  aspirin 325 mg oral Delayed Release (EC) tablet, 325 mg= 1 tab(s), Oral, Daily, 6 refills  bisacodyl 5 mg ORAL enteric coated tablet, 30 mg= 6 tab(s), Oral, Once  bumetanide 1 mg oral tablet, 1 mg= 1 tab(s), Oral, Daily, 1 refills,? ?Not Taking per Prescriber  calcium carbonate 600 mg oral tablet, 600 mg= 1 tab(s), Oral, Daily  cyanocobalamin 1000 mcg oral tablet, 1000 mcg= 1 tab(s), Oral, Daily, 5 refills  Flomax 0.4 mg oral capsule, 0.4 mg= 1 cap(s), Oral, Daily, 4 refills  furosemide 40 mg oral tablet, 40 mg= 1 tab(s), Oral, BID, 3 refills,? ?Not Taking per Prescriber  magnesium citrate oral liquid, 1 bottle(s)= 300 mL, Oral, Once  magnesium oxide 400 mg oral tablet, 400 mg= 1 tab(s), Oral, Daily  metoprolol tartrate 100 mg oral tablet, 100 mg= 1 tab(s), Oral, BID, 4 refills  Miralax - for colonoscopy prep, 238 gm= 14 packet(s), Oral, Once  Multi-Day Plus Minerals oral tablet, 1 tab(s), Oral, Daily  Nebulizer Machine, See Instructions,? ?Unable to obtain: PT STATED Last Dose Date/Time Unknown  Nebulizer Tubing Kit, See Instructions, 5 refills,? ?Unable to obtain: PT STATED Last Dose Date/Time Unknown  Retacrit, 26342 units= 1 mL, Subcutaneous, Once  Retacrit 4000 units/mL preservative-free injectable solution, See Instructions  Solumedrol IV push / IM, 125 mg= 2 mL, IV Push, Once  Toradol 30 mg for IV Push, 30 mg= 1 mL, IV Push, Once  Vitamin D2 2000 intl units oral capsule, 2000 IntUnit= 1 cap(s), Oral, Daily, 3 refills  Allergies  vancomycin  Social History  Abuse/Neglect  No, No, Yes, 07/23/2021  Alcohol - High Risk, 12/04/2014  Past, 07/23/2021  Employment/School  Work/School description: SSI Disability., 07/12/2021  Exercise - Does not exercise, 03/06/2015  Self assessment: Fair condition., 12/08/2016  Home/Environment  Lives  with Spouse. Living situation: Home/Independent. Alcohol abuse in household: No. Substance abuse in household: No. Smoker in household: No. Injuries/Abuse/Neglect in household: No. Feels unsafe at home: No. Safe place to go: Yes. Family/Friends available for support: Yes. Concern for family members at home: No. Major illness in household: No. Financial concerns: No. TV/Computer concerns: No., 12/08/2016  Nutrition/Health  Low sodium, Good, 07/12/2021  Other  Sexual  Gender Identity Identifies as male., 03/01/2021  Sexually active: Yes., 03/06/2015  Substance Use - Denies Substance Abuse, 12/04/2014  Never, 07/23/2021  Tobacco - Denies Tobacco Use, 12/04/2014  Never (less than 100 in lifetime), N/A, 07/23/2021  Family History  Alcohol user: Father.  Cataract.: Father.  Hyperlipidemia.: Brother.  Hypertension.: Father.  Tobacco user: Father.  Immunizations  Vaccine Date Status Comments   COVID-19 MRNA, LNP-S, PF- Pfizer 04/30/2021 Recorded    influenza virus vaccine, inactivated 09/26/2020 Given    influenza virus vaccine, inactivated 10/08/2019 Recorded    influenza virus vaccine, inactivated 10/05/2018 Given tolerated well   influenza virus vaccine, inactivated 12/13/2017 Given Nursing Judgment   influenza virus vaccine, inactivated 10/05/2017 Recorded    influenza virus vaccine, inactivated 09/28/2016 Given    influenza virus vaccine, inactivated 11/17/2015 Given    influenza virus vaccine, inactivated 02/25/2015 Given Med Not Available   pneumococcal 23-polyvalent vaccine 01/05/2014 Given    influenza virus vaccine, inactivated 01/05/2014 Given

## 2022-05-03 NOTE — HISTORICAL [facility]ERNER
This is a historical note converted from [facility]. Formatting and pictures may have been removed.  Please reference [facility] for original formatting and attached multimedia. Chief Complaint  myelofibrosis  History of Present Illness  Problem List:  Primary Myelofibrosis  -Diagnosed 01/08/2014 via [facility]  -JAK2 negative  ?   Current Treatment:  -PRN transfusions for Hgb <7 or symptomatic anemia  ?   -Procrit 40,000 units SubQ weekly  -Started: 11/20/2020  ?   Dose due: 1/12/2021  ?   Treatment History:  N/A  ?   HPI/Clinical History:  60-year-old gentleman with history of myelofibrosis, last seen in hematology clinic on 06/08/2017, subsequently lost to follow-up, now referred back to hematology for follow-up by internal medicine.  For full HPI please refer to MD last note dated 12/15/2020. Also refer to assessment and plan section.  ?   Interval History  1/12/2021: Patient presents for follow up on weekly Procrit; he is scheduled to receive a dose today. His only complaint is left wrist edema, pain, & warmth to touch. He presented to Urgent Care and was started on an antibiotic; the edema resolved almost completely but returned prior to completion of antibiotics. Advised him to follow up with his PCP regarding this; he states his appointment is in February. Advised him to call his PCPs nurse and inform her of this complaint to get a sooner appointment. Na & Cl low at 132 & 97; this is not uncommon for him. K still elevated at 5.8; will give Kayexalate today in Infusion. He has stopped bananas since yesterday. Ca low at 7.6; instructed him to start calcium supplement OTC. He is already on vitamin D supplementation. BUN WNL at 13; creatinine elevated at 1.34; eGFR low at 58. Encouraged him to drink less fruit juice and more water. Bili & LFTs WNL. Iron level WNL at 162; transferrin WNL at 196. TIBC low at 235; iron saturation 69%. Ferritin improved to 526.18. WBC 2.8; H&H 9.0 & 29.5; plts 188k; ANC 1240.? No indication of  transfusion; will continue weekly labs and Procrit. Will have him follow up in 2 weeks with labs for Procrit. He is amenable to this plan.  Review of Systems  A complete 12-point?ROS was performed in full with pertinent positives as described in interval history. Remainder of ROS done in full and are negative.  Physical Exam  Vitals & Measurements  T:?37.1? ?C (Oral)? HR:?76(Peripheral)? RR:?22? BP:?156/88?  BMI:?47.31?  General: ?Alert and oriented, No acute distress.??  Appearance: Well-groomed; obese.  HEENT: ?Normocephalic, Oral mucosa is moist.?Pupils are equal, round and reactive to light, Extraocular movements are intact, Normal conjunctiva.?  Neck: ?Supple, Non-tender, No lymphadenopathy, No thyromegaly.??  Respiratory: ?Lungs are clear to auscultation, Respirations are non-labored, Breath sounds are equal, Symmetrical chest wall expansion.??  Cardiovascular: ?Normal rate, Regular rhythm, No edema.??  Breast:??Breast exam not performed on todays visit.??  Gastrointestinal: Rounded,?Soft, Non-tender, Non-distended, Normal bowel sounds.??  Musculoskeletal: ?Normal strength.??Left wrist edematous.  Integumentary: ?Warm, dry, intact.??  Neurologic: ?Alert, Oriented, No focal deficits, Cranial Nerves II-XII are grossly intact.??  Cognition and Speech: ?Oriented, Speech clear and coherent.??Wearing face mask.  Psychiatric: ?Cooperative, Appropriate mood & affect. ?  ECOG Performance Scale: 2 - Capable of all self-care but unable to carry out any work activities. Up and about greater than 50 percent of waking hours.?  Assessment/Plan  1.?Primary myelofibrosis?D47.1  # Primary myelofibrosis, diagnosed on bone marrow biopsy (01/08/2014).? JAK2 mutation negative.? Extensive osteosclerosis and fibrosis.? Cellularity 10%.? Flow cytometry with <3% bone marrow blasts.? Normal immunophenotype.  -05/27/2017: Bone marrow biopsy: Markedly hypocellular, 10%; myelofibrosis; bone marrow predominantly fibrotic; a small population  of monoclonal CD5 negative, CD10 negative B cells, representing approximately 4% of lymphocytes  -Noncompliant with follow-up  -Not a candidate for allogenic stem cell transplant because of comorbidities and noncompliance  -PRBC x8 between 02/19/2015-12/12/2017  -10/28/2020: Erythropoietin level?370.4  ?-EGD (11/04/2020) during hospitalization 10/28/2020-11/04/2020 ([facility]), negative; stool for occult blood was positive x3; outpatient colonoscopy was recommended  -Feraheme 510 mg IV x2 (11/20/2020, 11/27/2020)?(relative iron deficiency)  -Procrit 40,000 subcutaneously weekly, started 11/20/2020  ?   # ?Chronic anemia, hemoglobin ~8.5 g/dL  -Normocytic  -PRBC x8 (02/19/2015-12/12/2017)  -PRBC x4 units?(10/28/2020-11/04/2020; hemoglobin 6.9)  -EGD (11/04/2020) during hospitalization 10/28/2020-11/04/2020 ([facility]), negative; stool for occult blood was positive x3; outpatient colonoscopy was recommended  -Feraheme 510 mg IV x2 (11/20/2020, 11/27/2020)  -Procrit 40,000 subcutaneously weekly, started 11/20/2020  ?   # ?Iron deficiency:  -10/28/2020:?Serum iron 8, low. ?TIBC 192, low.? Transferrin saturation 4%, low.? Ferritin 294.35, elevated?(relative iron deficiency)  -Screening colonoscopy 04/27/2015, was normal  -Patient is status post gastric bypass?in 2000  -EGD (11/04/2020) during hospitalization 10/28/2020-11/04/2020 ([facility]), negative; stool for occult blood was positive x3; outpatient colonoscopy was recommended  -Colonoscopy being planned by [facility]  -Feraheme 510 mg IV x2 (11/20/2020, 11/27/2020)  ?   # ?Leukopenia and neutropenia  -WBC 2.3.? ANC 0.77?(secondary to myelofibrosis)  -Platelet count normal  ?   # ?History of hepatosplenomegaly secondary to myelofibrosis  -CT A/P (01/20/2016): No megalies with 3.5 cm nodule; retroperitoneal fatty mass adjacent to IVC without change since 03/12/2020; bony sclerosis, consistent with myelofibrosis  -CT A/P  (03/19/2020): Hepatosplenomegaly, unchanged since 01/12/2016  ?   # ?Vitamin B12 deficiency  -B12 level 199 (09/01/2020)  -Iron stores normal (09/01/2020)  -10/28/2020:?Says that he has been taking?vitamin B12 orally, both tubal as well as pills,?14-12 years?after gastric bypass surgery  -10/28/2020: B12 level 1773, elevated?(absorbing satisfactorily via oral route).? Intrinsic factor antibody negative.  ?   # ?Morbidly obese, status post gastric bypass surgery?(2000):  -10/28/2020: Weight 322.97 LBS; height 175 cm; BMI 47.84  ?   # ?Atrial fibrillation;?on anticoagulation with Xarelto  ?  #Alcohol abuse.?  #Noncompliant with follow-ups.  #History of epistaxis, requiring cauterization in the past  #History of gastric bypass?in 2000  #History of back surgery?in [place]?in 2016 or 2017  ?  Plan:  Supportive care?with periodic transfusions?(for symptomatic anemia?or if hemoglobin <7 g/dL)  Erythropoietin level <500  Procrit 40,000 units subcutaneously weekly, started?11/20/2020  Not a candidate for allogenic stem cell transplant because of comorbidities.  ?  Chronically anemic.  PRBC x8 (02/19/2015-12/12/2017)  PRBC x5 (10/28/2020-11/04/2020)  Usual?hemoglobin ~8.5 g/dL  ?  -Relative iron deficiency (labs: 10/28/2020)  -Feraheme?510 mg IV x2 (11/20/2020, 11/27/2020)  -Recheck iron stores 6 weeks post completion of Feraheme  -FIT?test  -Screening colonoscopy 04/27/2015, was normal  -Patient is status post gastric bypass?in 2000  -EGD (11/04/2020): Negative?([facility],?hospitalized?10/28/2020-11/04/2020, symptomatic anemia, heme positive stool)  -Colonoscopy being planned by?[facility]  ?  B12 deficiency was noted on 09/01/2020.  Intrinsic factor antibody negative  Apparently, has been?taking vitamin B12?orally,?chewable as well as pills, for last 10-12 years?after gastric bypass surgery.  -Absorbing satisfactorily via oral route?(labs:?10/28/2020; B12 level?1773)  ?  Check CBC every week?prior to each  start of Procrit  ?  Continue Procrit weekly & weekly CBC monitoring.  Continue supportive care measures;?Hgb?9.0; no transfusion indicated.  Follow up in?2 weeks (F2F on 1/26/2021) with CBC, CMP.  Ordered:  ?  2.?Hyperkalemia?E87.5  Patients K was elevated at 6.1; he was instructed to stop eating bananas (was eating 6 per day)  Patient states he scaled down to eating 1 banana a day instead of zero; he ran out of bananas yesterday.  He reports drinking mostly fruit juices with banana in it; he drinks Gatorade as well and sometimes water.  ?  Instructed patient to drink more water, less fruit juice.  Kayexalate 15gm po once today.  Ordered:  ?   Problem List/Past Medical History  Ongoing  Acute urinary retention  Anemia  BPH with urinary obstruction  Coagulopathy  Congestive heart failure  HTN (hypertension)  Hyponatremia  Leg pain  Liver function failure  LV dysfunction  Morbid obesity(  Probable Diagnosis  )  Myelofibrosis  Myofibrosis  Primary myelofibrosis  Primary myelofibrosis  Historical  Apnea, sleep  Back pain  Cataract  CHF - Congestive heart failure  Hypertension  Nose bleed  Procedure/Surgical History  Transfusion of Nonautologous Red Blood Cells into Peripheral Vein, Percutaneous Approach (12/11/2017)  BONE MARROW ASPIRATION PERFORMED WITH BONE MARROW BIOPSY THROUGH THE SAME INCISION ON THE SAME DATE OF SERVICE (05/25/2017)  Bone marrow; biopsy, needle or trocar (05/25/2017)  Drainage of Bone Marrow, Percutaneous Approach, Diagnostic (05/25/2017)  Extraction of Iliac Bone Marrow, Percutaneous Approach, Diagnostic (05/25/2017)  Drainage of Right Upper Extremity, Percutaneous Approach (10/03/2016)  Drainage of Right Lower Arm Skin, External Approach (10/01/2016)  Incision and drainage of abscess (eg, carbuncle, suppurative hidradenitis, cutaneous or subcutaneous abscess, cyst, furuncle, or paronychia); simple or single (10/01/2016)  Drainage of Bladder with Drainage Device, Via Natural or Artificial  Opening (09/25/2016)  Insertion of temporary indwelling bladder catheter; simple (eg, Gordon) (09/25/2016)  Control nasal hemorrhage, anterior, simple (limited cautery and/or packing) any method (02/05/2016)  Packing of Nasal Region using Packing Material (02/05/2016)  Cataract Extraction Phacoemulsification (Right) (08/20/2015)  Extracapsular cataract removal with insertion of intraocular lens prosthesis (1 stage procedure), manual or mechanical technique (eg, irrigation and aspiration or phacoemulsification) (08/20/2015)  Insertion of intraocular lens prosthesis at time of cataract extraction, one-stage (08/20/2015)  Phacoemulsification and aspiration of cataract (08/20/2015)  Cataract Extraction Phacoemulsification (Left) (06/09/2015)  Extracapsular cataract removal with insertion of intraocular lens prosthesis (1 stage procedure), manual or mechanical technique (eg, irrigation and aspiration or phacoemulsification) (06/09/2015)  Insertion of intraocular lens prosthesis at time of cataract extraction, one-stage (06/09/2015)  Phacoemulsification and aspiration of cataract (06/09/2015)  Colonoscopy (04/27/2015)  Colonoscopy (04/27/2015)  Colonoscopy, flexible; diagnostic, including collection of specimen(s) by brushing or washing, when performed (separate procedure) (04/27/2015)  Transfusion of packed cells (02/23/2015)  Transfusion of packed cells (02/20/2015)  Biopsy Bone Marrow Aspiration (.) (01/08/2014)  Biopsy of bone marrow (01/08/2014)  gastric bypass  hernia repair   Medications  bisacodyl 5 mg ORAL enteric coated tablet, 30 mg= 6 tab(s), Oral, Once  cyanocobalamin 1000 mcg oral tablet, 1000 mcg= 1 tab(s), Oral, Daily, 5 refills  diltiazem 180 mg/24 hours oral CAPsule, extended release, 180 mg= 1 cap(s), Oral, Daily, 3 refills  ferrous sulfate 325 mg oral tablet, 325 mg= 1 tab(s), Oral, Every other day, 3 refills  Flomax 0.4 mg oral capsule, 0.4 mg= 1 cap(s), Oral, Daily, 3 refills  furosemide 40 mg oral  tablet, 40 mg= 1 tab(s), Oral, BID, 3 refills  Kayexalate, 15 gm= 60 mL, Oral, Once-chemo  lisinopril 5 mg oral tablet, 5 mg= 1 tab(s), Oral, Daily, 3 refills  magnesium citrate oral liquid, 1 bottle(s)= 300 mL, Oral, Once  magnesium gluconate 500 mg oral tablet, 500 mg= 1 tab(s), Oral, BID,? ?Not Taking, Completed Rx  metoprolol tartrate 100 mg oral tablet, 100 mg= 1 tab(s), Oral, BID, 3 refills  Miralax - for colonoscopy prep, 238 gm= 14 packet(s), Oral, Once  Multi-Day Plus Minerals oral tablet, 1 tab(s), Oral, Daily  Pantoprazole 40 mg ORAL EC-Tablet, 40 mg= 1 tab(s), Oral, BID,? ?Not taking  Retacrit, 23284 units= 1 mL, Subcutaneous, Once  sodium polystyrene sulfonate 15 g/60 mL oral suspension, 15 gm, Oral, Once  Solumedrol IV push / IM, 125 mg= 2 mL, IV Push, Once  TNF Medl (Template NonFormulary), See Instructions  Toradol 30 mg for IV Push, 30 mg= 1 mL, IV Push, Once  Vitamin D2 2000 intl units oral capsule, 2000 IntUnit= 1 cap(s), Oral, Daily, 3 refills  Allergies  No Known Allergies  Social History  Abuse/Neglect  No, No, Yes, 01/12/2021  Alcohol - High Risk, 12/04/2014  Past, Wine, 01/12/2021  Employment/School  disability, Work/School description: disabled. Operates hazardous equipment: No., 12/08/2016  Exercise - Does not exercise, 03/06/2015  Self assessment: Fair condition., 12/08/2016  Home/Environment  Lives with Spouse. Living situation: Home/Independent. Alcohol abuse in household: No. Substance abuse in household: No. Smoker in household: No. Injuries/Abuse/Neglect in household: No. Feels unsafe at home: No. Safe place to go: Yes. Family/Friends available for support: Yes. Concern for family members at home: No. Major illness in household: No. Financial concerns: No. TV/Computer concerns: No., 12/08/2016  Nutrition/Health  Caffeine intake amount: NONE. Wants to lose weight: Yes. Sleeping concerns: Yes. Feels highly stressed: No., 11/17/2015  Regular, 03/06/2015  Other  Sexual  Sexually active:  Yes., 03/06/2015  Substance Use - Denies Substance Abuse, 12/04/2014  Never, 01/12/2021  Tobacco - Denies Tobacco Use, 12/04/2014  Never (less than 100 in lifetime), N/A, 01/12/2021  Family History  Alcohol user: Father.  Cataract.: Father.  Hyperlipidemia.: Brother.  Hypertension.: Father.  Tobacco user: Father.  Immunizations  Vaccine Date Status Comments   influenza virus vaccine, inactivated 09/26/2020 Given    influenza virus vaccine, inactivated 10/08/2019 Recorded    influenza virus vaccine, inactivated 10/05/2018 Given tolerated well   influenza virus vaccine, inactivated 12/13/2017 Given Nursing Judgment   influenza virus vaccine, inactivated 09/28/2016 Given    influenza virus vaccine, inactivated 11/17/2015 Given    influenza virus vaccine, inactivated 02/25/2015 Given Med Not Available   pneumococcal 23-polyvalent vaccine 01/05/2014 Given    influenza virus vaccine, inactivated 01/05/2014 Given    Health Maintenance  Health Maintenance  ???Pending?(in the next year)  ??? ??OverDue  ??? ? ? ?Aspirin Therapy for CVD Prevention due??01/04/15??and every 1??year(s)  ??? ??Due?  ??? ? ? ?Alcohol Misuse Screening due??01/02/21??and every 1??year(s)  ??? ? ? ?Medicare Annual Wellness Exam due??01/12/21??and every 1??year(s)  ??? ? ? ?Tetanus Vaccine due??01/12/21??and every 10??year(s)  ??? ? ? ?Zoster Vaccine due??01/12/21??Unknown Frequency  ??? ??Due In Future?  ??? ? ? ?HF-Heart Failure Education not due until??11/04/21??and every 1??year(s)  ??? ? ? ?HF-LVEF not due until??11/04/21??and every 1??year(s)  ??? ? ? ?ADL Screening not due until??12/04/21??and every 1??year(s)  ??? ? ? ?Obesity Screening not due until??01/01/22??and every 1??year(s)  ???Satisfied?(in the past 1 year)  ??? ??Satisfied?  ??? ? ? ?ADL Screening on??12/04/20.??Satisfied by Yennifer Morris  ??? ? ? ?Blood Pressure Screening on??01/12/21.??Satisfied by Roxie Rocha  ??? ? ? ?Body Mass Index Check on??01/12/21.??Satisfied by Aj  Roxie  ??? ? ? ?Colorectal Screening on??12/18/20.??Satisfied by Radha Sosa  ??? ? ? ?Depression Screening on??01/12/21.??Satisfied by Roxie Rocha  ??? ? ? ?Diabetes Screening on??01/12/21.??Satisfied by Reginaldo Pierre  ??? ? ? ?Hypertension Management-Blood Pressure on??01/12/21.??Satisfied by Roxie Rocha  ??? ? ? ?Influenza Vaccine on??01/05/21.??Satisfied by Jessi Castro  ??? ? ? ?Lipid Screening on??02/06/20.??Satisfied by Dagmar Bullock  ??? ? ? ?Obesity Screening on??01/12/21.??Satisfied by Roxie Rocha  ??? ??Refused?  ??? ? ? ?Influenza Vaccine on??12/04/20.??Recorded by Joann Healy  ?  Lab Results  Test Name Test Result Date/Time   Sodium Lvl 132 mmol/L (Low) 01/12/2021 12:20 CST   Potassium Lvl 5.8 mmol/L (High) 01/12/2021 12:20 CST   Chloride 97 mmol/L (Low) 01/12/2021 12:20 CST   CO2 28 mmol/L 01/12/2021 12:20 CST   Calcium Lvl 7.6 mg/dL (Low) 01/12/2021 12:20 CST   Glucose Lvl 91 mg/dL 01/12/2021 12:20 CST   BUN 13.0 mg/dL 01/12/2021 12:20 CST   Creatinine 1.34 mg/dL (High) 01/12/2021 12:20 CST   eGFR-AA 70 (Low) 01/12/2021 12:20 CST   eGFR-DAVID 58 mL/min/1.73 m2 (Low) 01/12/2021 12:20 CST   Bili Total 0.5 mg/dL 01/12/2021 12:20 CST   Bili Direct 0.2 mg/dL 01/12/2021 12:20 CST   Bili Indirect 0.30 mg/dL 01/12/2021 12:20 CST   AST 22 unit/L 01/12/2021 12:20 CST   ALT 13 unit/L 01/12/2021 12:20 CST   Alk Phos 113 unit/L 01/12/2021 12:20 CST   Total Protein 6.0 gm/dL (Low) 01/12/2021 12:20 CST   Albumin Lvl 3.5 gm/dL 01/12/2021 12:20 CST   Globulin 2.5 gm/dL 01/12/2021 12:20 CST   A/G Ratio 1.4 ratio 01/12/2021 12:20 CST   Iron Lvl 162 ug/dL 01/12/2021 12:20 CST   Transferrin 196 mg/dL 01/12/2021 12:20 CST   TIBC 235 ug/dL (Low) 01/12/2021 12:20 CST   Iron Sat 69 % (High) 01/12/2021 12:20 CST   UIBC 73 ug/dL 01/12/2021 12:20 CST   WBC 2.8 x10(3)/mcL (Low) 01/12/2021 12:20 CST   RBC 3.27 x10(6)/mcL (Low) 01/12/2021 12:20 CST   Hgb 9.0 gm/dL (Low) 01/12/2021 12:20 CST   Hct 29.5 %  (Low) 01/12/2021 12:20 CST   Platelet 188 x10(3)/mcL 01/12/2021 12:20 CST   MCV 90.2 fL 01/12/2021 12:20 CST   MCH 27.5 pg 01/12/2021 12:20 CST   MCHC 30.5 gm/dL (Low) 01/12/2021 12:20 CST   RDW 21.7 % (High) 01/12/2021 12:20 CST   MPV 8.8 fL 01/12/2021 12:20 CST   Abs Neut 1.24 x10(3)/mcL (Low) 01/12/2021 12:20 CST

## 2022-05-03 NOTE — HISTORICAL [facility]
This is a historical note converted from [facility]. Formatting and pictures may have been removed.  Please reference [facility] for original formatting and attached multimedia. Chief Complaint  Primary Myleofibrosis  History of Present Illness  Problem List:  Primary Myelofibrosis  -Diagnosed 01/08/2014 via [facility]  -JAK2 negative  ?   Current Treatment:  -PRN transfusions for Hgb <7 or symptomatic anemia  ?   -Procrit 40,000 units SubQ weekly  -Started: 11/20/2020  ?   Dose due: 4/16/2021  ?   Treatment History:  N/A  ?   HPI/Clinical History:  60-year-old gentleman with history of myelofibrosis, last seen in hematology clinic on 06/08/2017, subsequently lost to follow-up, now referred back to hematology for follow-up by internal medicine.  For full HPI please refer to MD last note dated 12/15/2020. Also refer to assessment and plan section.  ?   Interval History  5/7/2021: Patient?presents for follow up on weekly Procrit; he is scheduled to receive a dose today. VS stable. Na & Cl low at 127 & 91, moderate hyponatremia. He is on HCTZ; advised him to contact his doctor to have this med changed. He states he was switched from lisinopril to HCTZ to keep his K levels WNL. K WNL at 5.1 today. Remaining CMP stable. WBC 3.1; H&H 9.8 & 31.8; plts 192k; ANC 1500. He reports increased SOB and SAAVEDRA; what used to take 1 trip now takes 3. He also reports being confused a few days ago. He states the last time he recalls being confused, his sons rushed him to the hospital and he was hospitalized for hyponatremia. Discussed with him the symptoms of hyponatremia and potential causes including thiazide diuretics; he is on HCTZ. Advised him to contact his prescriber; will message the prescriber as well. Will continue weekly labs and have him follow up in 3 weeks.  Review of Systems  A complete 12-point?ROS was performed in full with pertinent positives as described in interval history. Remainder of ROS done in full and are negative.  Physical  Exam  Vitals & Measurements  T:?36.6? ?C (Oral)? HR:?63(Peripheral)? RR:?16? BP:?118/69?  HT:?175.00?cm? WT:?149.800?kg? BMI:?48.91?  General: ?Alert and oriented, No acute distress.??  Appearance: Well-groomed; morbidly obese; wearing face mask.  HEENT: ?Normocephalic, Oral mucosa is moist.?Pupils are equal, round and reactive to light, Extraocular movements are intact, Normal conjunctiva.?  Neck: ?Supple, Non-tender, No lymphadenopathy, No thyromegaly.??  Respiratory: ?Lungs are clear to auscultation, Respirations are non-labored, Breath sounds are equal, Symmetrical chest wall expansion.??  Cardiovascular: ?Normal rate, Regular rhythm, No edema.??  Breast:??Breast exam not performed on todays visit.??  Gastrointestinal: Rounded,?Soft, Non-tender, Non-distended, Normal bowel sounds.??  Musculoskeletal: ?Normal strength.??  Integumentary: ?Warm, dry, intact.??  Neurologic: ?Alert, Oriented, No focal deficits, Cranial Nerves II-XII are grossly intact.??  Cognition and Speech: ?Oriented, Speech clear and coherent.??  Psychiatric: ?Cooperative, Appropriate mood & affect. ?  ECOG Performance Scale: 2 - Capable of all self-care but unable to carry out any work activities. Up and about greater than 50 percent of waking hours.?  Assessment/Plan  1.?Primary myelofibrosis?D47.1  # Primary myelofibrosis, diagnosed on bone marrow biopsy (01/08/2014).? JAK2 mutation negative.? Extensive osteosclerosis and fibrosis.? Cellularity 10%.? Flow cytometry with <3% bone marrow blasts.? Normal immunophenotype.  -05/27/2017: Bone marrow biopsy: Markedly hypocellular, 10%; myelofibrosis; bone marrow predominantly fibrotic; a small population of monoclonal CD5 negative, CD10 negative B cells, representing approximately 4% of lymphocytes  -Noncompliant with follow-up  -Not a candidate for allogenic stem cell transplant because of comorbidities and noncompliance  -PRBC x8 between 02/19/2015-12/12/2017  -10/28/2020: Erythropoietin  level?370.4  -EGD (11/04/2020) during hospitalization 10/28/2020-11/04/2020 ([facility]), negative; stool for occult blood was positive x3; outpatient colonoscopy was recommended  -Feraheme 510 mg IV x2 (11/20/2020, 11/27/2020)?(relative iron deficiency)  -Procrit 40,000 subcutaneously weekly, started 11/20/2020  ?   # ???Chronic anemia, hemoglobin ~8.5 g/dL  -Normocytic  -PRBC x8 (02/19/2015-12/12/2017)  -PRBC x4 units?(10/28/2020-11/04/2020; hemoglobin 6.9)  -EGD (11/04/2020) during hospitalization 10/28/2020-11/04/2020 ([facility]), negative; stool for occult blood was positive x3; outpatient colonoscopy was recommended  -Feraheme 510 mg IV x2 (11/20/2020, 11/27/2020)  -Procrit 40,000 subcutaneously weekly, started 11/20/2020  ?   # ???Iron deficiency:  -10/28/2020:?Serum iron 8, low. ?TIBC 192, low.? Transferrin saturation 4%, low.? Ferritin 294.35, elevated?(relative iron deficiency)  -Screening colonoscopy 04/27/2015, was normal  -Patient is status post gastric bypass?in 2000  -EGD (11/04/2020) during hospitalization 10/28/2020-11/04/2020 ([facility]), negative; stool for occult blood was positive x3; outpatient colonoscopy was recommended  -Colonoscopy being planned by [facility]  -Feraheme 510 mg IV x2 (11/20/2020, 11/27/2020)  ?   # ???Leukopenia and neutropenia  -WBC 2.3.? ANC 0.77?(secondary to myelofibrosis)  -Platelet count normal  ?   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   # ???Vitamin B12 deficiency  -B12 level 199 (09/01/2020)  -Iron stores normal (09/01/2020)  -10/28/2020:?Says that he has been taking?vitamin B12 orally, both tubal as well as pills,?14-12 years?after gastric bypass  surgery  -10/28/2020: B12 level 1773, elevated?(absorbing satisfactorily via oral route).? Intrinsic factor antibody negative.  ?   # ???Morbidly obese, status post gastric bypass surgery?(2000):  -10/28/2020: Weight 322.97 LBS; height 175 cm; BMI 47.84  ?   # ???Atrial fibrillation;?on anticoagulation with Xarelto  ?  #Alcohol abuse.?  #Noncompliant with follow-ups.  #History of epistaxis, requiring cauterization in the past  #History of gastric bypass?in 2000  #History of back surgery?in [place]?in 2016 or 2017  ?  Plan:  Supportive care?with periodic transfusions?(for symptomatic anemia?or if hemoglobin <7 g/dL)  Erythropoietin level <500  Procrit 40,000 units subcutaneously weekly, started?11/20/2020  Not a candidate for allogenic stem cell transplant because of comorbidities.  ?  Chronically anemic.  PRBC x8 (02/19/2015-12/12/2017)  PRBC x5 (10/28/2020-11/04/2020)  Usual?hemoglobin ~8.5 g/dL  ?  -Relative iron deficiency (labs: 10/28/2020)  -Feraheme?510 mg IV x2 (11/20/2020, 11/27/2020)  -Recheck iron stores 6 weeks post completion of Feraheme  -FIT?test  -Screening colonoscopy 04/27/2015, was normal  -Patient is status post gastric bypass?in 2000  -EGD (11/04/2020): Negative?(,?hospitalized?10/28/2020-11/04/2020, symptomatic anemia, heme positive stool)  -Colonoscopy being planned by?[facility]  ?  B12 deficiency was noted on 09/01/2020.  Intrinsic factor antibody negative  Apparently, has been?taking vitamin B12?orally,?chewable as well as pills, for last 10-12 years?after gastric bypass surgery.  -Absorbing satisfactorily via oral route?(labs:?10/28/2020; B12 level?1773)  ?  Check CBC every week?prior to each start of Procrit  ?  Continue supportive care measures.  Check CBC on 5/14/2021 & 5/21/2021?to be reviewed by infusion nurse.  If Hgb <7, transfuse 2U PRBCs leukoreduced. No tranfusion indicated forHgb 9.8 today.  Continue Procrit weekly & weekly CBC monitoring.  Follow up  in?3 weeks (F2F on 5/28/2021) with CBC, CMP.  Ordered:  ?   Problem List/Past Medical History  Ongoing  Acute urinary retention  Anemia  Atrial fibrillation  BPH with urinary obstruction  Coagulopathy  Congestive heart failure  HTN (hypertension)  Hyponatremia  Leg pain  Liver function failure  LV dysfunction  Morbid obesity(  Probable Diagnosis  )  Myelofibrosis  Myofibrosis  Primary myelofibrosis  Primary myelofibrosis  Historical  Apnea, sleep  Back pain  Cataract  CHF - Congestive heart failure  Hypertension  Nose bleed  Procedure/Surgical History  Transfusion of Nonautologous Red Blood Cells into Peripheral Vein, Percutaneous Approach (12/11/2017)  BONE MARROW ASPIRATION PERFORMED WITH BONE MARROW BIOPSY THROUGH THE SAME INCISION ON THE SAME DATE OF SERVICE (05/25/2017)  Bone marrow; biopsy, needle or trocar (05/25/2017)  Drainage of Bone Marrow, Percutaneous Approach, Diagnostic (05/25/2017)  Extraction of Iliac Bone Marrow, Percutaneous Approach, Diagnostic (05/25/2017)  Drainage of Right Upper Extremity, Percutaneous Approach (10/03/2016)  Drainage of Right Lower Arm Skin, External Approach (10/01/2016)  Incision and drainage of abscess (eg, carbuncle, suppurative hidradenitis, cutaneous or subcutaneous abscess, cyst, furuncle, or paronychia); simple or single (10/01/2016)  Drainage of Bladder with Drainage Device, Via Natural or Artificial Opening (09/25/2016)  Insertion of temporary indwelling bladder catheter; simple (eg, Gordon) (09/25/2016)  Control nasal hemorrhage, anterior, simple (limited cautery and/or packing) any method (02/05/2016)  Packing of Nasal Region using Packing Material (02/05/2016)  Cataract Extraction Phacoemulsification (Right) (08/20/2015)  Extracapsular cataract removal with insertion of intraocular lens prosthesis (1 stage procedure), manual or mechanical technique (eg, irrigation and aspiration or phacoemulsification) (08/20/2015)  Insertion of intraocular lens prosthesis at  time of cataract extraction, one-stage (08/20/2015)  Phacoemulsification and aspiration of cataract (08/20/2015)  Cataract Extraction Phacoemulsification (Left) (06/09/2015)  Extracapsular cataract removal with insertion of intraocular lens prosthesis (1 stage procedure), manual or mechanical technique (eg, irrigation and aspiration or phacoemulsification) (06/09/2015)  Insertion of intraocular lens prosthesis at time of cataract extraction, one-stage (06/09/2015)  Phacoemulsification and aspiration of cataract (06/09/2015)  Colonoscopy (04/27/2015)  Colonoscopy (04/27/2015)  Colonoscopy, flexible; diagnostic, including collection of specimen(s) by brushing or washing, when performed (separate procedure) (04/27/2015)  Transfusion of packed cells (02/23/2015)  Transfusion of packed cells (02/20/2015)  Biopsy Bone Marrow Aspiration (.) (01/08/2014)  Biopsy of bone marrow (01/08/2014)  gastric bypass  hernia repair   Medications  allopurinol 100 mg oral tablet, 100 mg= 1 tab(s), Oral, Daily, 1 refills  amlodipine 5 mg oral tablet, 5 mg= 1 tab(s), Oral, Daily, 1 refills  aspirin 325 mg oral Delayed Release (EC) tablet, 325 mg= 1 tab(s), Oral, Daily, 6 refills  bisacodyl 5 mg ORAL enteric coated tablet, 30 mg= 6 tab(s), Oral, Once  calcium carbonate 600 mg oral tablet, 600 mg= 1 tab(s), Oral, Daily  cyanocobalamin 1000 mcg oral tablet, 1000 mcg= 1 tab(s), Oral, Daily, 5 refills  diltiazem 180 mg/24 hours oral CAPsule, extended release, 180 mg= 1 cap(s), Oral, Daily, 3 refills  doxycycline hyclate 100 mg oral tablet, 100 mg= 1 tab(s), Oral, BID,? ?Not Taking, Completed Rx  ferrous sulfate 325 mg oral tablet, 325 mg= 1 tab(s), Oral, Every other day, 3 refills,? ?Not taking  Flomax 0.4 mg oral capsule, 0.4 mg= 1 cap(s), Oral, Daily, 3 refills  hydrochlorothiazide 12.5 mg oral tablet, 12.5 mg= 1 tab(s), Oral, Daily, 1 refills  Lasix 40 mg oral tablet, 40 mg= 1 tab(s), Oral, Daily, 1 refills  lisinopril 5 mg oral tablet, 5  mg= 1 tab(s), Oral, Daily,? ?Not taking  magnesium citrate oral liquid, 1 bottle(s)= 300 mL, Oral, Once  magnesium oxide 400 mg oral tablet, 400 mg= 1 tab(s), Oral, Daily  metoprolol tartrate 100 mg oral tablet, 100 mg= 1 tab(s), Oral, BID, 3 refills  Miralax - for colonoscopy prep, 238 gm= 14 packet(s), Oral, Once  Multi-Day Plus Minerals oral tablet, 1 tab(s), Oral, Daily  Pantoprazole 40 mg ORAL EC-Tablet, 40 mg= 1 tab(s), Oral, BID,? ?Not taking  prednisONE 20 mg oral tablet, 20 mg= 1 tab(s), Oral, BID,? ?Not taking  Procrit 2000 units/mL preservative-free injectable solution, 2000 units= 1 mL, Subcutaneous, 3x/Wk,? ?Not taking  Retacrit 4000 units/mL preservative-free injectable solution, See Instructions  Solumedrol IV push / IM, 125 mg= 2 mL, IV Push, Once  TNF Medl (Template NonFormulary), See Instructions,? ?Not Taking, Completed Rx  Toradol 30 mg for IV Push, 30 mg= 1 mL, IV Push, Once  Vitamin D2 2000 intl units oral capsule, 2000 IntUnit= 1 cap(s), Oral, Daily, 3 refills  Allergies  vancomycin  Social History  Abuse/Neglect  No, No, Yes, 05/07/2021  Alcohol - High Risk, 12/04/2014  Past, 05/07/2021  Employment/School  disability, Work/School description: disabled. Operates hazardous equipment: No., 12/08/2016  Exercise - Does not exercise, 03/06/2015  Self assessment: Fair condition., 12/08/2016  Home/Environment  Lives with Spouse. Living situation: Home/Independent. Alcohol abuse in household: No. Substance abuse in household: No. Smoker in household: No. Injuries/Abuse/Neglect in household: No. Feels unsafe at home: No. Safe place to go: Yes. Family/Friends available for support: Yes. Concern for family members at home: No. Major illness in household: No. Financial concerns: No. TV/Computer concerns: No., 12/08/2016  Nutrition/Health  Caffeine intake amount: NONE. Wants to lose weight: Yes. Sleeping concerns: Yes. Feels highly stressed: No., 11/17/2015  Regular, 03/06/2015  Other  Sexual  Gender  Identity Identifies as male., 03/01/2021  Sexually active: Yes., 03/06/2015  Substance Use - Denies Substance Abuse, 12/04/2014  Never, 05/07/2021  Tobacco - Denies Tobacco Use, 12/04/2014  Never (less than 100 in lifetime), N/A, 05/07/2021  Family History  Alcohol user: Father.  Cataract.: Father.  Hyperlipidemia.: Brother.  Hypertension.: Father.  Tobacco user: Father.  Immunizations  Vaccine Date Status Comments   influenza virus vaccine, inactivated 09/26/2020 Given    influenza virus vaccine, inactivated 10/08/2019 Recorded    influenza virus vaccine, inactivated 10/05/2018 Given tolerated well   influenza virus vaccine, inactivated 12/13/2017 Given Nursing Judgment   influenza virus vaccine, inactivated 10/05/2017 Recorded    influenza virus vaccine, inactivated 09/28/2016 Given    influenza virus vaccine, inactivated 11/17/2015 Given    influenza virus vaccine, inactivated 02/25/2015 Given Med Not Available   pneumococcal 23-polyvalent vaccine 01/05/2014 Given    influenza virus vaccine, inactivated 01/05/2014 Given    Health Maintenance  Health Maintenance  ???Pending?(in the next year)  ??? ??OverDue  ??? ? ? ?Alcohol Misuse Screening due??01/02/21??and every 1??year(s)  ??? ??Due?  ??? ? ? ?Medicare Annual Wellness Exam due??05/07/21??and every 1??year(s)  ??? ? ? ?Tetanus Vaccine due??05/07/21??and every 10??year(s)  ??? ? ? ?Zoster Vaccine due??05/07/21??Unknown Frequency  ??? ??Due In Future?  ??? ? ? ?HF-LVEF not due until??11/04/21??and every 1??year(s)  ??? ? ? ?Obesity Screening not due until??01/01/22??and every 1??year(s)  ??? ? ? ?Aspirin Therapy for CVD Prevention not due until??02/24/22??and every 1??year(s)  ??? ? ? ?ADL Screening not due until??03/01/22??and every 1??year(s)  ??? ? ? ?HF-Heart Failure Education not due until??04/30/22??and every 1??year(s)  ???Satisfied?(in the past 1 year)  ??? ??Satisfied?  ??? ? ? ?ADL Screening on??03/01/21.??Satisfied by Caro Thompson RN  ??? ? ?  ?Aspirin Therapy for CVD Prevention on??02/24/21.??Satisfied by Nneka Lofton LPN  ??? ? ? ?Blood Pressure Screening on??05/07/21.??Satisfied by Tomasa Chavez LPN  ??? ? ? ?Body Mass Index Check on??05/07/21.??Satisfied by Tomasa Chavez LPN  ??? ? ? ?Colorectal Screening on??12/18/20.??Satisfied by Radha Sosa  ??? ? ? ?Coronary Artery Disease Maintenance-Antiplatelet Agent Prescribed on??02/19/21.??Satisfied by Codey Ansari MD  ??? ? ? ?Depression Screening on??05/07/21.??Satisfied by Tomasa Chavez LPN  ??? ? ? ?Diabetes Screening on??05/07/21.??Satisfied by Rodolfo Du Jr.  ??? ? ? ?Hypertension Management-Blood Pressure on??05/07/21.??Satisfied by Tomasa Chavez LPN  ??? ? ? ?Influenza Vaccine on??01/05/21.??Satisfied by Jessi Castro  ??? ? ? ?Obesity Screening on??05/07/21.??Satisfied by Tomasa Chavez LPN  ??? ??Refused?  ??? ? ? ?Influenza Vaccine on??12/04/20.??Recorded by Joann Healy  ?  Lab Results  Test Name Test Result Date/Time   Sodium Lvl 127 mmol/L (Low) 05/07/2021 08:15 CDT   Potassium Lvl 5.1 mmol/L 05/07/2021 08:15 CDT   Chloride 91 mmol/L (Low) 05/07/2021 08:15 CDT   CO2 27 mmol/L 05/07/2021 08:15 CDT   Calcium Lvl 8.7 mg/dL 05/07/2021 08:15 CDT   Glucose Lvl 82 mg/dL 05/07/2021 08:15 CDT   BUN 21.6 mg/dL 05/07/2021 08:15 CDT   Creatinine 0.75 mg/dL 05/07/2021 08:15 CDT   Est Creat Clearance Ser 105.70 mL/min 05/07/2021 09:09 CDT   eGFR-AA >105 05/07/2021 08:15 CDT   eGFR-DAVID >105 mL/min/1.73 m2 05/07/2021 08:15 CDT   Bili Total 0.6 mg/dL 05/07/2021 08:15 CDT   Bili Direct 0.3 mg/dL 05/07/2021 08:15 CDT   Bili Indirect 0.30 mg/dL 05/07/2021 08:15 CDT   AST 24 unit/L 05/07/2021 08:15 CDT   ALT 14 unit/L 05/07/2021 08:15 CDT   Alk Phos 96 unit/L 05/07/2021 08:15 CDT   Total Protein 6.3 gm/dL (Low) 05/07/2021 08:15 CDT   Albumin Lvl 3.8 gm/dL 05/07/2021 08:15 CDT   Globulin 2.5 gm/dL 05/07/2021 08:15 CDT   A/G Ratio 1.5 ratio 05/07/2021 08:15 CDT   WBC 3.1 x10(3)/mcL  (Low) 05/07/2021 08:15 CDT   RBC 3.63 x10(6)/mcL (Low) 05/07/2021 08:15 CDT   Hgb 9.8 gm/dL (Low) 05/07/2021 08:15 CDT   Hct 31.8 % (Low) 05/07/2021 08:15 CDT   Platelet 192 x10(3)/mcL 05/07/2021 08:15 CDT   MCV 87.6 fL 05/07/2021 08:15 CDT   MCH 27.0 pg 05/07/2021 08:15 CDT   MCHC 30.8 gm/dL (Low) 05/07/2021 08:15 CDT   RDW 19.4 % (High) 05/07/2021 08:15 CDT   MPV 8.6 fL 05/07/2021 08:15 CDT   Abs Neut 1.50 x10(3)/mcL (Low) 05/07/2021 08:15 CDT

## 2022-05-03 NOTE — HISTORICAL [facility]
This is a historical note converted from Ashanti. Formatting and pictures may have been removed.  Please reference Ashanti for original formatting and attached multimedia. Chief Complaint  Retacrit  History of Present Illness  Problem List:  Primary Myelofibrosis  -Diagnosed 01/08/2014 via [facility]  -JAK2 negative  ?   Current Treatment:  -PRN transfusions for Hgb <7 or symptomatic anemia  ?   -Procrit 40,000 units SubQ weekly  -Started: 11/20/2020  ?   Treatment History:  N/A  ?   HPI/Clinical History:  60-year-old gentleman with history of myelofibrosis, last seen in hematology clinic on 06/08/2017, subsequently lost to follow-up, now referred back to hematology for follow-up by internal medicine.  For full HPI please refer to MD last note dated 12/15/2020. Also refer to assessment and plan section.  ?   Interval History  7/9/2021: Patient?presents for follow up on weekly Procrit; he is scheduled to receive a dose today. He reports fluid retention and that he is self dosing with his Lasix?. Patient advised against do this and?also advised to follow up with his PCP if he is still concerned with retaining fluids and current medications being ineffective. Lab work reviewed with patient Hgb continue to decline 8.6 HcT 27.7. Will continue with procrit injection today. He reports extreme fatigue. Patient amenable to discussion. Denies any further questions or concerns.  Review of Systems  A complete 12-point ROS was performed in full with pertinent positives as described in interval history. Remainder of ROS done in full and are negative.  ?  Physical Exam  Vitals & Measurements  T:?37.0? ?C (Oral)? HR:?81(Peripheral)? RR:?18? BP:?110/59?  HT:?175.00?cm? WT:?140.400?kg? BMI:?45.84?  Physical Exam:  General: Alert and oriented, No acute distress.?  Appearance: Well-groomed wearing mask  HEENT: Normocephalic, Oral mucosa is moist. Pupils are equal, round and reactive to light, Extraocular movements are intact, Normal  conjunctiva.?  Neck: Supple, Non-tender, No lymphadenopathy, No thyromegaly.?  Respiratory: Lungs are clear to auscultation, Respirations are non-labored, Breath sounds are equal, Symmetrical chest wall expansion.?  Cardiovascular: Normal rate, Regular rhythm, No edema.?  Breast: Breast exam not performed on todays visit.?  Gastrointestinal: Rounded, Soft, Non-tender, Non-distended, Normal bowel sounds.?  Musculoskeletal: Normal strength. Ambulates without assistance  Integumentary: Warm, dry, intact.?  Neurologic: Alert, Oriented, No focal deficits, Cranial Nerves II-XII are grossly intact.?  Cognition and Speech: Oriented, Speech clear and coherent.?  Psychiatric: Cooperative, Appropriate mood & affect.?  ECOG Performance Scale:?0 -?No restrictions  ?  Assessment/Plan  1.?Primary myelofibrosis?D47.1  # Primary myelofibrosis, diagnosed on bone marrow biopsy (01/08/2014).? JAK2 mutation negative.? Extensive osteosclerosis and fibrosis.? Cellularity 10%.? Flow cytometry with <3% bone marrow blasts.? Normal immunophenotype.  -05/27/2017: Bone marrow biopsy: Markedly hypocellular, 10%; myelofibrosis; bone marrow predominantly fibrotic; a small population of monoclonal CD5 negative, CD10 negative B cells, representing approximately 4% of lymphocytes  -Noncompliant with follow-up  -Not a candidate for allogenic stem cell transplant because of comorbidities and noncompliance  -PRBC x8 between 02/19/2015-12/12/2017  -10/28/2020: Erythropoietin level?370.4  -EGD (11/04/2020) during hospitalization 10/28/2020-11/04/2020 ([facility]), negative; stool for occult blood was positive x3; outpatient colonoscopy was recommended  -Feraheme 510 mg IV x2 (11/20/2020, 11/27/2020)?(relative iron deficiency)  -Procrit 40,000 subcutaneously weekly, started 11/20/2020  ?   # ???Chronic anemia, hemoglobin ~8.5 g/dL  -Normocytic  -PRBC x8 (02/19/2015-12/12/2017)  -PRBC x4 units?(10/28/2020-11/04/2020; hemoglobin 6.9)  -EGD  (11/04/2020) during hospitalization 10/28/2020-11/04/2020 ([facility]), negative; stool for occult blood was positive x3; outpatient colonoscopy was recommended  -Feraheme 510 mg IV x2 (11/20/2020, 11/27/2020)  -Procrit 40,000 subcutaneously weekly, started 11/20/2020  ?   # ???Iron deficiency:  -10/28/2020:?Serum iron 8, low. ?TIBC 192, low.? Transferrin saturation 4%, low.? Ferritin 294.35, elevated?(relative iron deficiency)  -Screening colonoscopy 04/27/2015, was normal  -Patient is status post gastric bypass?in 2000  -EGD (11/04/2020) during hospitalization 10/28/2020-11/04/2020 ([facility]), negative; stool for occult blood was positive x3; outpatient colonoscopy was recommended  -Colonoscopy being planned by [facility]  -Feraheme 510 mg IV x2 (11/20/2020, 11/27/2020)  ?   # ???Leukopenia and neutropenia  -WBC 2.3.? ANC 0.77?(secondary to myelofibrosis)  -Platelet count normal  ?   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   # ???Vitamin B12 deficiency  -B12 level 199 (09/01/2020)  -Iron stores normal (09/01/2020)  -10/28/2020:?Says that he has been taking?vitamin B12 orally, both tubal as well as pills,?14-12 years?after gastric bypass surgery  -10/28/2020: B12 level 1773, elevated?(absorbing satisfactorily via oral route).? Intrinsic factor antibody negative.  ?   # ???Morbidly obese, status post gastric bypass surgery?(2000):  -10/28/2020: Weight 322.97 LBS; height 175 cm; BMI 47.84  ?   # ???Atrial fibrillation;?on anticoagulation with Xarelto  ?  #Alcohol abuse.?  #Noncompliant with follow-ups.  #History of epistaxis, requiring cauterization in the past  #History of gastric bypass?in 2000  #History of back  surgery?in [place]?in 2016 or 2017  ?  Plan:  Supportive care?with periodic transfusions?(for symptomatic anemia?or if hemoglobin <7 g/dL)  Erythropoietin level <500  Procrit 40,000 units subcutaneously weekly, started?11/20/2020  Not a candidate for allogenic stem cell transplant because of comorbidities.  ?  Chronically anemic.  PRBC x8 (02/19/2015-12/12/2017)  PRBC x5 (10/28/2020-11/04/2020)  Usual?hemoglobin ~8.5 g/dL  ?  -Relative iron deficiency (labs: 10/28/2020)  -Feraheme?510 mg IV x2 (11/20/2020, 11/27/2020)  -Recheck iron stores 6 weeks post completion of Feraheme  -FIT?test  -Screening colonoscopy 04/27/2015, was normal  -Patient is status post gastric bypass?in 2000  -EGD (11/04/2020): Negative?([facility],?hospitalized?10/28/2020-11/04/2020, symptomatic anemia, heme positive stool)  -Colonoscopy being planned by?[facility]  ?  B12 deficiency was noted on 09/01/2020.  Intrinsic factor antibody negative  Apparently, has been?taking vitamin B12?orally,?chewable as well as pills, for last 10-12 years?after gastric bypass surgery.  -Absorbing satisfactorily via oral route?(labs:?10/28/2020; B12 level?1773)  ?  Check CBC every week?prior to each start of Procrit  ?  Continue supportive care measures.  Check CBC on 6/25/2021 & 7/2/2021?to be reviewed by infusion nurse.  If Hgb <7, transfuse 2U PRBCs leukoreduced. No transfusion indicated for Hgb?8.6 today.  Continue Procrit weekly & weekly CBC monitoring.  Follow up in?6 weeks? with CBC, CMP, Iron Profile, ferritin, B12 level, folate, folic acid, ?F2F W/NP  Ordered:  ?  Orders:   Problem List/Past Medical History  Ongoing  Acute urinary retention  Anemia  Atrial fibrillation  BPH with urinary obstruction  Coagulopathy  Congestive heart failure  HTN (hypertension)  Hyponatremia  Leg pain  Liver function failure  LV dysfunction  Morbid obesity(  Probable Diagnosis  )  Myelofibrosis  Myofibrosis  Primary myelofibrosis  Primary  myelofibrosis  Historical  Apnea, sleep  Back pain  Cataract  CHF - Congestive heart failure  Hypertension  Nose bleed  Procedure/Surgical History  Fluoroscopy of Multiple Coronary Arteries using Low Osmolar Contrast (06/28/2021)  Fluoroscopy of Thoracic Aorta using Low Osmolar Contrast (06/28/2021)  Measurement of Cardiac Sampling and Pressure, Left Heart, Percutaneous Approach (06/28/2021)  Transfusion of Nonautologous Red Blood Cells into Peripheral Vein, Percutaneous Approach (12/11/2017)  BONE MARROW ASPIRATION PERFORMED WITH BONE MARROW BIOPSY THROUGH THE SAME INCISION ON THE SAME DATE OF SERVICE (05/25/2017)  Bone marrow; biopsy, needle or trocar (05/25/2017)  Drainage of Bone Marrow, Percutaneous Approach, Diagnostic (05/25/2017)  Extraction of Iliac Bone Marrow, Percutaneous Approach, Diagnostic (05/25/2017)  Drainage of Right Upper Extremity, Percutaneous Approach (10/03/2016)  Drainage of Right Lower Arm Skin, External Approach (10/01/2016)  Incision and drainage of abscess (eg, carbuncle, suppurative hidradenitis, cutaneous or subcutaneous abscess, cyst, furuncle, or paronychia); simple or single (10/01/2016)  Drainage of Bladder with Drainage Device, Via Natural or Artificial Opening (09/25/2016)  Insertion of temporary indwelling bladder catheter; simple (eg, Gordon) (09/25/2016)  Control nasal hemorrhage, anterior, simple (limited cautery and/or packing) any method (02/05/2016)  Packing of Nasal Region using Packing Material (02/05/2016)  Cataract Extraction Phacoemulsification (Right) (08/20/2015)  Extracapsular cataract removal with insertion of intraocular lens prosthesis (1 stage procedure), manual or mechanical technique (eg, irrigation and aspiration or phacoemulsification) (08/20/2015)  Insertion of intraocular lens prosthesis at time of cataract extraction, one-stage (08/20/2015)  Phacoemulsification and aspiration of cataract (08/20/2015)  Cataract Extraction Phacoemulsification (Left)  (06/09/2015)  Extracapsular cataract removal with insertion of intraocular lens prosthesis (1 stage procedure), manual or mechanical technique (eg, irrigation and aspiration or phacoemulsification) (06/09/2015)  Insertion of intraocular lens prosthesis at time of cataract extraction, one-stage (06/09/2015)  Phacoemulsification and aspiration of cataract (06/09/2015)  Colonoscopy (04/27/2015)  Colonoscopy (04/27/2015)  Colonoscopy, flexible; diagnostic, including collection of specimen(s) by brushing or washing, when performed (separate procedure) (04/27/2015)  Transfusion of packed cells (02/23/2015)  Transfusion of packed cells (02/20/2015)  Biopsy Bone Marrow Aspiration (.) (01/08/2014)  Biopsy of bone marrow (01/08/2014)  gastric bypass  hernia repair   Medications  albuterol-ipratropium 100 mcg-20 mcg/inh inhalation aerosol, 1 puff(s), INH, QID, 5 refills,? ?Not taking  allopurinol 100 mg oral tablet, 100 mg= 1 tab(s), Oral, Daily, 4 refills  amlodipine 5 mg oral tablet, 5 mg= 1 tab(s), Oral, Daily, 3 refills  aspirin 325 mg oral Delayed Release (EC) tablet, 325 mg= 1 tab(s), Oral, Daily, 6 refills  bisacodyl 5 mg ORAL enteric coated tablet, 30 mg= 6 tab(s), Oral, Once  bumetanide 1 mg oral tablet, 1 mg= 1 tab(s), Oral, Daily, 1 refills,? ?Not taking: duplicate  bumetanide 1 mg oral tablet, 1 mg= 1 tab(s), Oral, Daily, 1 refills  bumetanide 1 mg oral tablet, 1 mg= 1 tab(s), Oral, Daily, 1 refills,? ?Not taking: duplicate  calcium carbonate 600 mg oral tablet, 600 mg= 1 tab(s), Oral, Daily  cyanocobalamin 1000 mcg oral tablet, 1000 mcg= 1 tab(s), Oral, Daily, 5 refills  diltiazem 180 mg/24 hours oral CAPsule, extended release, 180 mg= 1 cap(s), Oral, Daily, 5 refills,? ?Unable to obtain  Flomax 0.4 mg oral capsule, 0.4 mg= 1 cap(s), Oral, Daily, 4 refills  furosemide 40 mg oral tablet, 40 mg= 1 tab(s), Oral, Daily  hydrochlorothiazide 12.5 mg oral tablet, 12.5 mg= 1 tab(s), Oral, Daily,? ?Not taking  magnesium  citrate oral liquid, 1 bottle(s)= 300 mL, Oral, Once  magnesium oxide 400 mg oral tablet, 400 mg= 1 tab(s), Oral, Daily  metoprolol tartrate 100 mg oral tablet, 100 mg= 1 tab(s), Oral, BID, 4 refills  Miralax - for colonoscopy prep, 238 gm= 14 packet(s), Oral, Once  Multi-Day Plus Minerals oral tablet, 1 tab(s), Oral, Daily  Nebulizer Machine, See Instructions,? ?Not taking  Nebulizer Tubing Kit, See Instructions, 5 refills,? ?Not taking  Retacrit 4000 units/mL preservative-free injectable solution, See Instructions  Solumedrol IV push / IM, 125 mg= 2 mL, IV Push, Once  Toradol 30 mg for IV Push, 30 mg= 1 mL, IV Push, Once  Vitamin D2 2000 intl units oral capsule, 2000 IntUnit= 1 cap(s), Oral, Daily, 3 refills  Allergies  vancomycin  Social History  Abuse/Neglect  No, No, Yes, 07/09/2021  Alcohol - High Risk, 12/04/2014  Past, Wine, 07/09/2021  Employment/School  disability, Work/School description: disabled. Operates hazardous equipment: No., 12/08/2016  Exercise - Does not exercise, 03/06/2015  Self assessment: Fair condition., 12/08/2016  Home/Environment  Lives with Spouse. Living situation: Home/Independent. Alcohol abuse in household: No. Substance abuse in household: No. Smoker in household: No. Injuries/Abuse/Neglect in household: No. Feels unsafe at home: No. Safe place to go: Yes. Family/Friends available for support: Yes. Concern for family members at home: No. Major illness in household: No. Financial concerns: No. TV/Computer concerns: No., 12/08/2016  Nutrition/Health  Caffeine intake amount: NONE. Wants to lose weight: Yes. Sleeping concerns: Yes. Feels highly stressed: No., 11/17/2015  Regular, 03/06/2015  Other  Sexual  Gender Identity Identifies as male., 03/01/2021  Sexually active: Yes., 03/06/2015  Substance Use - Denies Substance Abuse, 12/04/2014  Never, 07/09/2021  Tobacco - Denies Tobacco Use, 12/04/2014  Never (less than 100 in lifetime), N/A, 07/09/2021  Family History  Alcohol user:  Father.  Cataract.: Father.  Hyperlipidemia.: Brother.  Hypertension.: Father.  Tobacco user: Father.  Immunizations  Vaccine Date Status Comments   COVID-19 MRNA, LNP-S, PF- Pfizer 04/30/2021 Recorded    influenza virus vaccine, inactivated 09/26/2020 Given    influenza virus vaccine, inactivated 10/08/2019 Recorded    influenza virus vaccine, inactivated 10/05/2018 Given tolerated well   influenza virus vaccine, inactivated 12/13/2017 Given Nursing Judgment   influenza virus vaccine, inactivated 10/05/2017 Recorded    influenza virus vaccine, inactivated 09/28/2016 Given    influenza virus vaccine, inactivated 11/17/2015 Given    influenza virus vaccine, inactivated 02/25/2015 Given Med Not Available   pneumococcal 23-polyvalent vaccine 01/05/2014 Given    influenza virus vaccine, inactivated 01/05/2014 Given    Health Maintenance  Health Maintenance  ???Pending?(in the next year)  ??? ??OverDue  ??? ? ? ?Alcohol Misuse Screening due??01/02/21??and every 1??year(s)  ??? ??Due?  ??? ? ? ?Tetanus Vaccine due??07/09/21??and every 10??year(s)  ??? ? ? ?Zoster Vaccine due??07/09/21??Unknown Frequency  ??? ??Due In Future?  ??? ? ? ?Obesity Screening not due until??01/01/22??and every 1??year(s)  ??? ? ? ?ADL Screening not due until??05/19/22??and every 1??year(s)  ??? ? ? ?Medicare Annual Wellness Exam not due until??05/20/22??and every 1??year(s)  ??? ? ? ?HF-LVEF not due until??06/09/22??and every 1??year(s)  ??? ? ? ?HF-Heart Failure Education not due until??06/29/22??and every 1??year(s)  ??? ? ? ?Aspirin Therapy for CVD Prevention not due until??06/29/22??and every 1??year(s)  ???Satisfied?(in the past 1 year)  ??? ??Satisfied?  ??? ? ? ?ADL Screening on??05/19/21.??Satisfied by Livia Francois LPN  ??? ? ? ?Aspirin Therapy for CVD Prevention on??06/29/21.??Satisfied by Citlali Anderson LPN  ??? ? ? ?Blood Pressure Screening on??07/09/21.??Satisfied by Jessi Castro  ??? ? ? ?Body Mass Index Check  on??07/09/21.??Satisfied by Jessi Castro  ??? ? ? ?Colorectal Screening on??12/18/20.??Satisfied by Radha Sosa  ??? ? ? ?Coronary Artery Disease Maintenance-Antiplatelet Agent Prescribed on??02/19/21.??Satisfied by Codey Ansari MD  ??? ? ? ?Depression Screening on??07/09/21.??Satisfied by Tomasa Chavez LPN  ??? ? ? ?Diabetes Screening on??07/09/21.??Satisfied by Nenita Kiran  ??? ? ? ?Hypertension Management-Blood Pressure on??07/09/21.??Satisfied by Jessi Castro  ??? ? ? ?Influenza Vaccine on??01/05/21.??Satisfied by Jessi Castro  ??? ? ? ?Lipid Screening on??06/10/21.??Satisfied by Sherley Amador  ??? ? ? ?Medicare Annual Wellness Exam on??05/20/21.??Satisfied by Rufus Paiz MD  ??? ? ? ?Obesity Screening on??07/09/21.??Satisfied by Jessi Castro  ??? ??Refused?  ??? ? ? ?Influenza Vaccine on??12/04/20.??Recorded by Joann Healy  ?  Lab Results  Test Name Test Result Date/Time   Sodium Lvl 138 mmol/L 07/09/2021 09:44 CDT   Potassium Lvl 4.7 mmol/L 07/09/2021 09:44 CDT   Chloride 97 mmol/L (Low) 07/09/2021 09:44 CDT   CO2 31 mmol/L (High) 07/09/2021 09:44 CDT   Calcium Lvl 9.0 mg/dL 07/09/2021 09:44 CDT   Magnesium Lvl 1.50 mg/dL (Low) 07/09/2021 09:53 CDT   Glucose Lvl 93 mg/dL 07/09/2021 09:44 CDT   BUN 16.7 mg/dL 07/09/2021 09:44 CDT   Creatinine 0.89 mg/dL 07/09/2021 09:44 CDT   Est Creat Clearance Ser 89.07 mL/min 07/09/2021 10:34 CDT   eGFR-AA >105 07/09/2021 09:44 CDT   eGFR-DAVID 93 mL/min/1.73 m2 07/09/2021 09:44 CDT   Bili Total 1.3 mg/dL 07/09/2021 09:44 CDT   Bili Direct 0.5 mg/dL 07/09/2021 09:44 CDT   Bili Indirect 0.80 mg/dL 07/09/2021 09:44 CDT   AST 19 unit/L 07/09/2021 09:44 CDT   ALT 9 unit/L 07/09/2021 09:44 CDT   Alk Phos 79 unit/L 07/09/2021 09:44 CDT   Total Protein 6.5 gm/dL 07/09/2021 09:44 CDT   Albumin Lvl 3.6 gm/dL 07/09/2021 09:44 CDT   Globulin 2.9 gm/dL 07/09/2021 09:44 CDT   A/G Ratio 1.2 ratio 07/09/2021 09:44 CDT   Phosphorus 4.5  mg/dL 07/09/2021 09:53 CDT   WBC 3.8 x10(3)/mcL (Low) 07/09/2021 09:44 CDT   RBC 3.25 x10(6)/mcL (Low) 07/09/2021 09:44 CDT   Hgb 8.6 gm/dL (Low) 07/09/2021 09:44 CDT   Hct 27.7 % (Low) 07/09/2021 09:44 CDT   Platelet 179 x10(3)/mcL 07/09/2021 09:44 CDT   MCV 85.2 fL 07/09/2021 09:44 CDT   MCH 26.5 pg 07/09/2021 09:44 CDT   MCHC 31.0 gm/dL 07/09/2021 09:44 CDT   RDW 18.5 % (High) 07/09/2021 09:44 CDT   MPV 10.1 fL 07/09/2021 09:44 CDT   Abs Neut 1.10 x10(3)/mcL (Low) 07/09/2021 09:44 CDT   Segs Man 39 % (Low) 07/09/2021 09:44 CDT   Lymph Man 14.0 % (Low) 07/09/2021 09:44 CDT   Monocyte Man 17 % (High) 07/09/2021 09:44 CDT   Eos Man 2 % 07/09/2021 09:44 CDT   Basophil Man 1 % 07/09/2021 09:44 CDT   [place] Man 1 % (High) 07/09/2021 09:44 CDT   Blasts Man 26 % 07/09/2021 09:44 CDT   NRBC Man 11 % 07/09/2021 09:44 CDT   Hypochrom 1+ 07/09/2021 09:44 CDT   Platelet Est Adequate 07/09/2021 09:44 CDT   Anisocyte 1+ 07/09/2021 09:44 CDT

## 2022-05-03 NOTE — HISTORICAL [facility]
This is a historical note converted from Ashanti. Formatting and pictures may have been removed.  Please reference Ashanti for original formatting and attached multimedia. Chief Complaint  referral for left wrist  History of Present Illness  60-year-old male here to be evaluated for his left wrist?to recap he does not know when he got injured if he got injured at all he said he had some wrist pain in December but he does not really recall?recently was hospitalized for cellulitis about his left wrist he has many health problems?he said thumb spica brace has made his wrist feel a lot better he wears it intermittently however?he does say he feels much better in his wrist.? After extensive questioning he still cannot identify any time of injury denies numbness or tingling.? This is in contrast to his description of his injury when he was seen in the hospital  ?  Review of Systems  Constitutional:?no fever, fatigue, weakness  Eye:?no vision loss, eye redness, drainage, or pain  ENMT:?no sore throat, ear pain, sinus pain/congestion, nasal congestion/drainage  Respiratory:?no cough, no wheezing, no shortness of breath  Cardiovascular:?no chest pain, no palpitations, no edema  Gastrointestinal:?no nausea, vomiting, or diarrhea. No abdominal pain  ?  ?  Physical Exam  Vitals & Measurements  HT:?175.00?cm? WT:?145.000?kg? BMI:?47.35?  General alert and oriented no acute distress  Chest normal breathing  Abdomen obese  Left upper extremity  He is fully neurovascular intact  He is completely nontender to palpation around the scaphoid and the anatomic snuffbox he is not any pain with passive range of motion or active range of motion of the wrist  His cellulitis is 100% resolved  ?  ?  I independently reviewed?3 views of his left wrist as well as prior films and interpreted todays films?he has persistent demonstration of his scaphoid fracture is not displaced anymore since his prior study that I can see he has SL interval widening  which was present on his previous films as well  Assessment/Plan  Wrist pain, left?M25.532  ?60-year-old male here with left scaphoid fracture treated in a thumb spica brace. ?He cannot recall the event?which we know happened while he was in the hospital. ?We will continue the thumb spica brace for now we will see him in 6 weeks  ?  Brian Wood MD  ?  Sonia Hernandez?was evaluated at the time of the encounter with?Dr Wood.? HPI, PE and treatment plan was reviewed.? Treatment plan was reasonable and necessary.??Imaging was reviewed at the time of visit.??   Medications  allopurinol 100 mg oral tablet, 100 mg= 1 tab(s), Oral, Daily, 2 refills  aspirin 325 mg oral Delayed Release (EC) tablet, 325 mg= 1 tab(s), Oral, Daily, 6 refills  bisacodyl 5 mg ORAL enteric coated tablet, 30 mg= 6 tab(s), Oral, Once  cyanocobalamin 1000 mcg oral tablet, 1000 mcg= 1 tab(s), Oral, Daily, 5 refills  diltiazem 180 mg/24 hours oral CAPsule, extended release, 180 mg= 1 cap(s), Oral, Daily, 3 refills  doxycycline hyclate 100 mg oral tablet, 100 mg= 1 tab(s), Oral, BID  ferrous sulfate 325 mg oral tablet, 325 mg= 1 tab(s), Oral, Every other day, 3 refills  Flomax 0.4 mg oral capsule, 0.4 mg= 1 cap(s), Oral, Daily, 3 refills  Lasix 40 mg oral tablet, 40 mg= 1 tab(s), Oral, Daily  magnesium citrate oral liquid, 1 bottle(s)= 300 mL, Oral, Once  magnesium oxide 400 mg oral tablet, 400 mg= 1 tab(s), Oral, Daily  metoprolol tartrate 100 mg oral tablet, 100 mg= 1 tab(s), Oral, BID, 3 refills  Miralax - for colonoscopy prep, 238 gm= 14 packet(s), Oral, Once  Multi-Day Plus Minerals oral tablet, 1 tab(s), Oral, Daily  Norvasc 5 mg oral tablet, 5 mg= 1 tab(s), Oral, Daily  Pantoprazole 40 mg ORAL EC-Tablet, 40 mg= 1 tab(s), Oral, BID  Procrit 2000 units/mL preservative-free injectable solution, 2000 units= 1 mL, Subcutaneous, 3x/Wk  Solumedrol IV push / IM, 125 mg= 2 mL, IV Push, Once  TNF Medl (Template NonFormulary), See Instructions,? ?Not  Taking, Completed Rx  Toradol 30 mg for IV Push, 30 mg= 1 mL, IV Push, Once  Vitamin D2 2000 intl units oral capsule, 2000 IntUnit= 1 cap(s), Oral, Daily, 3 refills

## 2022-05-03 NOTE — HISTORICAL [facility]ER
This is a historical note converted from [facility]. Formatting and pictures may have been removed.  Please reference [facility]anti for original formatting and attached multimedia. Chief Complaint  Primary Myelofibrosis  History of Present Illness  Problem List:  Primary Myelofibrosis  -Diagnosed 01/08/2014 via [facility]  -JAK2 negative  ?   Current Treatment:  -PRN transfusions for Hgb <7 or symptomatic anemia  ?   -Procrit 40,000 units SubQ weekly  -Started: 11/20/2020  ?   Dose due: 4/2/2021  ?   Treatment History:  N/A  ?   HPI/Clinical History:  60-year-old gentleman with history of myelofibrosis, last seen in hematology clinic on 06/08/2017, subsequently lost to follow-up, now referred back to hematology for follow-up by internal medicine.  For full HPI please refer to MD last note dated 12/15/2020. Also refer to assessment and plan section.  ?   Interval History  3/26/2021: Patient presents for follow up on weekly Procrit; he is scheduled to receive a dose today. VS stable. Na low at 135; K elevated again at 5.5; remaining electrolytes WNL. BUN/creatinine/eGFR WNL. Bili & LFTs WNL. WBC 3.3; H&H 9.4 & 31.9; plts 231k; ANC 1260. He reports his new PCP changed his BP meds to keep his K WNL; this was as recent as 1 week ago per his report. Advised him to follow up with IM clinic regarding this as his K is mildly elevated again. He denies eating bananas and drinking fruit juices. Will allow his PCP to manage his hyperkalemia at this point. Will continue weekly labs and Procrit and have him follow up in 3 weeks. He is amenable to this plan.  Review of Systems  A complete 12-point?ROS was performed in full with pertinent positives as described in interval history. Remainder of ROS done in full and are negative.  Physical Exam  Vitals & Measurements  T:?36.8? ?C (Oral)? HR:?74(Peripheral)? RR:?20? BP:?130/74?  HT:?175.00?cm? WT:?145.200?kg? BMI:?47.41?  General: ?Alert and oriented, No acute distress.??  Appearance: Well-groomed;  obese.  HEENT: ?Normocephalic, Oral mucosa is moist.?Pupils are equal, round and reactive to light, Extraocular movements are intact, Normal conjunctiva.?  Neck: ?Supple, Non-tender, No lymphadenopathy, No thyromegaly.??  Respiratory: ?Lungs are clear to auscultation, Respirations are non-labored, Breath sounds are equal, Symmetrical chest wall expansion.??  Cardiovascular: ?Normal rate, Regular rhythm, No edema.??  Breast:??Breast exam not performed on todays visit.??  Gastrointestinal: Rounded,?Soft, Non-tender, Non-distended, Normal bowel sounds.??  Musculoskeletal: ?Normal strength.??Shortness of breath with ambulation.  Integumentary: ?Warm, dry, intact.??  Neurologic: ?Alert, Oriented, No focal deficits, Cranial Nerves II-XII are grossly intact.??  Cognition and Speech: ?Oriented, Speech clear and coherent.??Wearing face mask.  Psychiatric: ?Cooperative, Appropriate mood & affect. ?  ECOG Performance Scale: 2 - Capable of all self-care but unable to carry out any work activities. Up and about greater than 50 percent of waking hours.?  Assessment/Plan  1.?Primary myelofibrosis?D47.1  # Primary myelofibrosis, diagnosed on bone marrow biopsy (01/08/2014).? JAK2 mutation negative.? Extensive osteosclerosis and fibrosis.? Cellularity 10%.? Flow cytometry with <3% bone marrow blasts.? Normal immunophenotype.  -05/27/2017: Bone marrow biopsy: Markedly hypocellular, 10%; myelofibrosis; bone marrow predominantly fibrotic; a small population of monoclonal CD5 negative, CD10 negative B cells, representing approximately 4% of lymphocytes  -Noncompliant with follow-up  -Not a candidate for allogenic stem cell transplant because of comorbidities and noncompliance  -PRBC x8 between 02/19/2015-12/12/2017  -10/28/2020: Erythropoietin level?370.4  -EGD (11/04/2020) during hospitalization 10/28/2020-11/04/2020 ([facility]), negative; stool for occult blood was positive x3; outpatient colonoscopy was  recommended  -Feraheme 510 mg IV x2 (11/20/2020, 11/27/2020)?(relative iron deficiency)  -Procrit 40,000 subcutaneously weekly, started 11/20/2020  ?   # ???Chronic anemia, hemoglobin ~8.5 g/dL  -Normocytic  -PRBC x8 (02/19/2015-12/12/2017)  -PRBC x4 units?(10/28/2020-11/04/2020; hemoglobin 6.9)  -EGD (11/04/2020) during hospitalization 10/28/2020-11/04/2020 ([facility]), negative; stool for occult blood was positive x3; outpatient colonoscopy was recommended  -Feraheme 510 mg IV x2 (11/20/2020, 11/27/2020)  -Procrit 40,000 subcutaneously weekly, started 11/20/2020  ?   # ???Iron deficiency:  -10/28/2020:?Serum iron 8, low. ?TIBC 192, low.? Transferrin saturation 4%, low.? Ferritin 294.35, elevated?(relative iron deficiency)  -Screening colonoscopy 04/27/2015, was normal  -Patient is status post gastric bypass?in 2000  -EGD (11/04/2020) during hospitalization 10/28/2020-11/04/2020 ([facility]), negative; stool for occult blood was positive x3; outpatient colonoscopy was recommended  -Colonoscopy being planned by [facility]  -Feraheme 510 mg IV x2 (11/20/2020, 11/27/2020)  ?   # ???Leukopenia and neutropenia  -WBC 2.3.? ANC 0.77?(secondary to myelofibrosis)  -Platelet count normal  ?   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   # ???Vitamin B12 deficiency  -B12 level 199 (09/01/2020)  -Iron stores normal (09/01/2020)  -10/28/2020:?Says that he has been taking?vitamin B12 orally, both tubal as well as pills,?14-12 years?after gastric bypass surgery  -10/28/2020: B12 level 1773, elevated?(absorbing satisfactorily via oral route).? Intrinsic factor antibody negative.  ?   # ???Morbidly obese, status post gastric bypass  surgery?(2000):  -10/28/2020: Weight 322.97 LBS; height 175 cm; BMI 47.84  ?   # ???Atrial fibrillation;?on anticoagulation with Xarelto  ?  #Alcohol abuse.?  #Noncompliant with follow-ups.  #History of epistaxis, requiring cauterization in the past  #History of gastric bypass?in 2000  #History of back surgery?in [place]?in 2016 or 2017  ?  Plan:  Supportive care?with periodic transfusions?(for symptomatic anemia?or if hemoglobin <7 g/dL)  Erythropoietin level <500  Procrit 40,000 units subcutaneously weekly, started?11/20/2020  Not a candidate for allogenic stem cell transplant because of comorbidities.  ?  Chronically anemic.  PRBC x8 (02/19/2015-12/12/2017)  PRBC x5 (10/28/2020-11/04/2020)  Usual?hemoglobin ~8.5 g/dL  ?  -Relative iron deficiency (labs: 10/28/2020)  -Feraheme?510 mg IV x2 (11/20/2020, 11/27/2020)  -Recheck iron stores 6 weeks post completion of Feraheme  -FIT?test  -Screening colonoscopy 04/27/2015, was normal  -Patient is status post gastric bypass?in 2000  -EGD (11/04/2020): Negative?([facility],?hospitalized?10/28/2020-11/04/2020, symptomatic anemia, heme positive stool)  -Colonoscopy being planned by?[facility]  ?  B12 deficiency was noted on 09/01/2020.  Intrinsic factor antibody negative  Apparently, has been?taking vitamin B12?orally,?chewable as well as pills, for last 10-12 years?after gastric bypass surgery.  -Absorbing satisfactorily via oral route?(labs:?10/28/2020; B12 level?1773)  ?  Check CBC every week?prior to each start of Procrit  ?  Continue supportive care measures;?Hgb 9.4; no transfusion indicated.  Continue Procrit weekly & weekly CBC monitoring.  Follow up in?3 weeks (F2F on 4/16/2021) with CBC, CMP.  Ordered:  ?   Problem List/Past Medical History  Ongoing  Acute urinary retention  Anemia  BPH with urinary obstruction  Coagulopathy  Congestive heart failure  HTN (hypertension)  Hyponatremia  Leg pain  Liver function failure  LV dysfunction  Morbid  obesity(  Probable Diagnosis  )  Myelofibrosis  Myofibrosis  Primary myelofibrosis  Primary myelofibrosis  Historical  Apnea, sleep  Back pain  Cataract  CHF - Congestive heart failure  Hypertension  Nose bleed  Procedure/Surgical History  Transfusion of Nonautologous Red Blood Cells into Peripheral Vein, Percutaneous Approach (12/11/2017)  BONE MARROW ASPIRATION PERFORMED WITH BONE MARROW BIOPSY THROUGH THE SAME INCISION ON THE SAME DATE OF SERVICE (05/25/2017)  Bone marrow; biopsy, needle or trocar (05/25/2017)  Drainage of Bone Marrow, Percutaneous Approach, Diagnostic (05/25/2017)  Extraction of Iliac Bone Marrow, Percutaneous Approach, Diagnostic (05/25/2017)  Drainage of Right Upper Extremity, Percutaneous Approach (10/03/2016)  Drainage of Right Lower Arm Skin, External Approach (10/01/2016)  Incision and drainage of abscess (eg, carbuncle, suppurative hidradenitis, cutaneous or subcutaneous abscess, cyst, furuncle, or paronychia); simple or single (10/01/2016)  Drainage of Bladder with Drainage Device, Via Natural or Artificial Opening (09/25/2016)  Insertion of temporary indwelling bladder catheter; simple (eg, Gordon) (09/25/2016)  Control nasal hemorrhage, anterior, simple (limited cautery and/or packing) any method (02/05/2016)  Packing of Nasal Region using Packing Material (02/05/2016)  Cataract Extraction Phacoemulsification (Right) (08/20/2015)  Extracapsular cataract removal with insertion of intraocular lens prosthesis (1 stage procedure), manual or mechanical technique (eg, irrigation and aspiration or phacoemulsification) (08/20/2015)  Insertion of intraocular lens prosthesis at time of cataract extraction, one-stage (08/20/2015)  Phacoemulsification and aspiration of cataract (08/20/2015)  Cataract Extraction Phacoemulsification (Left) (06/09/2015)  Extracapsular cataract removal with insertion of intraocular lens prosthesis (1 stage procedure), manual or mechanical technique (eg, irrigation  and aspiration or phacoemulsification) (06/09/2015)  Insertion of intraocular lens prosthesis at time of cataract extraction, one-stage (06/09/2015)  Phacoemulsification and aspiration of cataract (06/09/2015)  Colonoscopy (04/27/2015)  Colonoscopy (04/27/2015)  Colonoscopy, flexible; diagnostic, including collection of specimen(s) by brushing or washing, when performed (separate procedure) (04/27/2015)  Transfusion of packed cells (02/23/2015)  Transfusion of packed cells (02/20/2015)  Biopsy Bone Marrow Aspiration (.) (01/08/2014)  Biopsy of bone marrow (01/08/2014)  gastric bypass  hernia repair   Medications  allopurinol 100 mg oral tablet, 100 mg= 1 tab(s), Oral, Daily, 1 refills,? ?Investigating  amlodipine 5 mg oral tablet, 5 mg= 1 tab(s), Oral, Daily, 1 refills,? ?Investigating  aspirin 325 mg oral Delayed Release (EC) tablet, 325 mg= 1 tab(s), Oral, Daily, 6 refills  bisacodyl 5 mg ORAL enteric coated tablet, 30 mg= 6 tab(s), Oral, Once  calcium carbonate 600 mg oral tablet, 600 mg= 1 tab(s), Oral, Daily  cyanocobalamin 1000 mcg oral tablet, 1000 mcg= 1 tab(s), Oral, Daily, 5 refills  diltiazem 180 mg/24 hours oral CAPsule, extended release, 180 mg= 1 cap(s), Oral, Daily, 3 refills  ferrous sulfate 325 mg oral tablet, 325 mg= 1 tab(s), Oral, Every other day, 3 refills,? ?Not taking  Flomax 0.4 mg oral capsule, 0.4 mg= 1 cap(s), Oral, Daily, 3 refills  hydrochlorothiazide 12.5 mg oral tablet, 12.5 mg= 1 tab(s), Oral, Daily, 1 refills  Lasix 40 mg oral tablet, 40 mg= 1 tab(s), Oral, Daily, 1 refills  magnesium citrate oral liquid, 1 bottle(s)= 300 mL, Oral, Once  magnesium oxide 400 mg oral tablet, 400 mg= 1 tab(s), Oral, Daily  metoprolol tartrate 100 mg oral tablet, 100 mg= 1 tab(s), Oral, BID, 3 refills  Miralax - for colonoscopy prep, 238 gm= 14 packet(s), Oral, Once  Multi-Day Plus Minerals oral tablet, 1 tab(s), Oral, Daily  Pantoprazole 40 mg ORAL EC-Tablet, 40 mg= 1 tab(s), Oral, BID,? ?Not  taking  Procrit 2000 units/mL preservative-free injectable solution, 2000 units= 1 mL, Subcutaneous, 3x/Wk,? ?Not taking  Retacrit, 07832 units= 1 mL, Subcutaneous, Once  Retacrit 4000 units/mL preservative-free injectable solution, See Instructions  Solumedrol IV push / IM, 125 mg= 2 mL, IV Push, Once  TNF Medl (Template NonFormulary), See Instructions,? ?Not Taking, Completed Rx  Toradol 30 mg for IV Push, 30 mg= 1 mL, IV Push, Once  Vitamin D2 2000 intl units oral capsule, 2000 IntUnit= 1 cap(s), Oral, Daily, 3 refills  Allergies  vancomycin  Social History  Abuse/Neglect  No, No, Yes, 03/26/2021  Alcohol - High Risk, 12/04/2014  Current, Beer, 1-2 times per month, 03/26/2021  Employment/School  disability, Work/School description: disabled. Operates hazardous equipment: No., 12/08/2016  Exercise - Does not exercise, 03/06/2015  Self assessment: Fair condition., 12/08/2016  Home/Environment  Lives with Spouse. Living situation: Home/Independent. Alcohol abuse in household: No. Substance abuse in household: No. Smoker in household: No. Injuries/Abuse/Neglect in household: No. Feels unsafe at home: No. Safe place to go: Yes. Family/Friends available for support: Yes. Concern for family members at home: No. Major illness in household: No. Financial concerns: No. TV/Computer concerns: No., 12/08/2016  Nutrition/Health  Caffeine intake amount: NONE. Wants to lose weight: Yes. Sleeping concerns: Yes. Feels highly stressed: No., 11/17/2015  Regular, 03/06/2015  Other  Sexual  Gender Identity Identifies as male., 03/01/2021  Sexually active: Yes., 03/06/2015  Substance Use - Denies Substance Abuse, 12/04/2014  Never, 03/26/2021  Tobacco - Denies Tobacco Use, 12/04/2014  Never (less than 100 in lifetime), No, 03/26/2021  Family History  Alcohol user: Father.  Cataract.: Father.  Hyperlipidemia.: Brother.  Hypertension.: Father.  Tobacco user: Father.  Immunizations  Vaccine Date Status Comments   influenza virus vaccine,  inactivated 09/26/2020 Given    influenza virus vaccine, inactivated 10/08/2019 Recorded    influenza virus vaccine, inactivated 10/05/2018 Given tolerated well   influenza virus vaccine, inactivated 12/13/2017 Given Nursing Judgment   influenza virus vaccine, inactivated 10/05/2017 Recorded    influenza virus vaccine, inactivated 09/28/2016 Given    influenza virus vaccine, inactivated 11/17/2015 Given    influenza virus vaccine, inactivated 02/25/2015 Given Med Not Available   pneumococcal 23-polyvalent vaccine 01/05/2014 Given    influenza virus vaccine, inactivated 01/05/2014 Given    Health Maintenance  Health Maintenance  ???Pending?(in the next year)  ??? ??OverDue  ??? ? ? ?Alcohol Misuse Screening due??01/02/21??and every 1??year(s)  ??? ??Due?  ??? ? ? ?Medicare Annual Wellness Exam due??03/26/21??and every 1??year(s)  ??? ? ? ?Tetanus Vaccine due??03/26/21??and every 10??year(s)  ??? ? ? ?Zoster Vaccine due??03/26/21??Unknown Frequency  ??? ??Due In Future?  ??? ? ? ?HF-LVEF not due until??11/04/21??and every 1??year(s)  ??? ? ? ?Obesity Screening not due until??01/01/22??and every 1??year(s)  ??? ? ? ?HF-Heart Failure Education not due until??02/24/22??and every 1??year(s)  ??? ? ? ?Aspirin Therapy for CVD Prevention not due until??02/24/22??and every 1??year(s)  ??? ? ? ?ADL Screening not due until??03/01/22??and every 1??year(s)  ???Satisfied?(in the past 1 year)  ??? ??Satisfied?  ??? ? ? ?ADL Screening on??03/01/21.??Satisfied by Caro Thompson RN  ??? ? ? ?Aspirin Therapy for CVD Prevention on??02/24/21.??Satisfied by Nneka Lofton LPN  ??? ? ? ?Blood Pressure Screening on??03/26/21.??Satisfied by Chelsea Zhou  ??? ? ? ?Body Mass Index Check on??03/26/21.??Satisfied by Chelsea Zhou  ??? ? ? ?Colorectal Screening on??12/18/20.??Satisfied by Radha Sosa  ??? ? ? ?Coronary Artery Disease Maintenance-Antiplatelet Agent Prescribed on??02/19/21.??Satisfied by Elan RODNEY, Codey GARCIA  ??? ?  ? ?Depression Screening on??03/26/21.??Satisfied by Chelsea Zhou  ??? ? ? ?Diabetes Screening on??03/26/21.??Satisfied by Florencia lAonso  ??? ? ? ?Hypertension Management-Blood Pressure on??03/26/21.??Satisfied by Chelsea Zhou  ??? ? ? ?Influenza Vaccine on??01/05/21.??Satisfied by Jessi Castro  ??? ? ? ?Obesity Screening on??03/26/21.??Satisfied by Chelsea Zhou  ??? ??Refused?  ??? ? ? ?Influenza Vaccine on??12/04/20.??Recorded by Joann Healy  ?  Lab Results  Test Name Test Result Date/Time   Sodium Lvl 135 mmol/L (Low) 03/26/2021 09:30 CDT   Potassium Lvl 5.5 mmol/L (High) 03/26/2021 09:30 CDT   Chloride 99 mmol/L 03/26/2021 09:30 CDT   CO2 28 mmol/L 03/26/2021 09:30 CDT   Calcium Lvl 9.0 mg/dL 03/26/2021 09:30 CDT   Glucose Lvl 90 mg/dL 03/26/2021 09:30 CDT   BUN 17.5 mg/dL 03/26/2021 09:30 CDT   Creatinine 0.71 mg/dL (Low) 03/26/2021 09:30 CDT   Est Creat Clearance Ser 113.03 mL/min 03/26/2021 10:15 CDT   eGFR-AA >105 03/26/2021 09:30 CDT   eGFR-DAVID >105 mL/min/1.73 m2 03/26/2021 09:30 CDT   Bili Total 0.5 mg/dL 03/26/2021 09:30 CDT   Bili Direct 0.2 mg/dL 03/26/2021 09:30 CDT   Bili Indirect 0.30 mg/dL 03/26/2021 09:30 CDT   AST 29 unit/L 03/26/2021 09:30 CDT   ALT 18 unit/L 03/26/2021 09:30 CDT   Alk Phos 94 unit/L 03/26/2021 09:30 CDT   Total Protein 6.7 gm/dL 03/26/2021 09:30 CDT   Albumin Lvl 3.7 gm/dL 03/26/2021 09:30 CDT   Globulin 3.0 gm/dL 03/26/2021 09:30 CDT   A/G Ratio 1.2 ratio 03/26/2021 09:30 CDT   WBC 3.3 x10(3)/mcL (Low) 03/26/2021 09:30 CDT   RBC 3.45 x10(6)/mcL (Low) 03/26/2021 09:30 CDT   Hgb 9.4 gm/dL (Low) 03/26/2021 09:30 CDT   Hct 31.9 % (Low) 03/26/2021 09:30 CDT   Platelet 231 x10(3)/mcL 03/26/2021 09:30 CDT   MCV 92.5 fL 03/26/2021 09:30 CDT   MCH 27.2 pg 03/26/2021 09:30 CDT   MCHC 29.5 gm/dL (Low) 03/26/2021 09:30 CDT   RDW 20.8 % (High) 03/26/2021 09:30 CDT   MPV 9.1 fL 03/26/2021 09:30 CDT   Abs Neut 1.26 x10(3)/mcL (Low) 03/26/2021 09:30 CDT   Abs  Neutro 1.36 x10(3)/mcL (Low) 03/26/2021 09:30 CDT

## 2022-05-03 NOTE — HISTORICAL [facility]ERNER
This is a historical note converted from [facility]. Formatting and pictures may have been removed.  Please reference [facility] for original formatting and attached multimedia. Chief Complaint  Myelofibrosis  History of Present Illness  Problem List:  Primary Myelofibrosis  -Diagnosed 01/08/2014 via BMB  -JAK2 negative  ?   Current Treatment:  -PRN transfusions for Hgb <7 or symptomatic anemia  ?   -Procrit 40,000 units SubQ weekly  -Started: 11/20/2020  ?   Dose due: 1/26/2021  ?   Treatment History:  N/A  ?   HPI/Clinical History:  60-year-old gentleman with history of myelofibrosis, last seen in hematology clinic on 06/08/2017, subsequently lost to follow-up, now referred back to hematology for follow-up by internal medicine.  For full HPI please refer to MD last note dated 12/15/2020. Also refer to assessment and plan section.  ?   Interval History  1/26/2021: Patient presents for follow up on weekly Procrit; he is scheduled to receive a dose today. He has no complaints. Na & Cl still low at 130 & 90; K elevated again at 5.4. He admits to resuming eating?bananas and continuing to drink Gatorade & fruit juices with banana in them. He apparently does not recall discussing the discontinuation of these items at his last visit. Explained to him the function of potassium and the potential effect of arrhythmia from hyperkalemia. He states he has afib; explained to him this is a type of arrhythmia. Reiterated to him to stop drinking banana juice drinks and Gatorade and stop eating bananas altogether. He verbalizes understanding and states he would rather quit bananas than have to take Kayexalate again. BUN/creatinine/eGFR WNL. Bili & LFTs WNL. WBC 2.9; H&H 9.1 & 28.7; plts 221k; ANC 1130. Will continue weekly labs and Procrit. Will recheck CMP with next weeks labs. He is amenable to this plan.  Review of Systems  A complete 12-point?ROS was performed in full with pertinent positives as described in interval history. Remainder  of ROS done in full and are negative.  Physical Exam  Vitals & Measurements  T:?36.9? ?C (Oral)? HR:?68(Peripheral)? RR:?20? BP:?142/82?  HT:?175.00?cm? WT:?144.100?kg? BMI:?47.05?  General: ?Alert and oriented, No acute distress.??  Appearance: Well-groomed; obese.  HEENT: ?Normocephalic, Oral mucosa is moist.?Pupils are equal, round and reactive to light, Extraocular movements are intact, Normal conjunctiva.?  Neck: ?Supple, Non-tender, No lymphadenopathy, No thyromegaly.??  Respiratory: ?Lungs are clear to auscultation, Respirations are non-labored, Breath sounds are equal, Symmetrical chest wall expansion.??  Cardiovascular: ?Normal rate, Regular rhythm, No edema.??  Breast:??Breast exam not performed on todays visit.??  Gastrointestinal: Rounded,?Soft, Non-tender, Non-distended, Normal bowel sounds.??  Musculoskeletal: ?Normal strength.??  Integumentary: ?Warm, dry, intact.??  Neurologic: ?Alert, Oriented, No focal deficits, Cranial Nerves II-XII are grossly intact.??  Cognition and Speech: ?Oriented, Speech clear and coherent.??Wearing face mask.  Psychiatric: ?Cooperative, Appropriate mood & affect. ?  ECOG Performance Scale: 2 - Capable of all self-care but unable to carry out any work activities. Up and about greater than 50 percent of waking hours.?  Assessment/Plan  1.?Primary myelofibrosis?D47.1  # Primary myelofibrosis, diagnosed on bone marrow biopsy (01/08/2014).? JAK2 mutation negative.? Extensive osteosclerosis and fibrosis.? Cellularity 10%.? Flow cytometry with <3% bone marrow blasts.? Normal immunophenotype.  -05/27/2017: Bone marrow biopsy: Markedly hypocellular, 10%; myelofibrosis; bone marrow predominantly fibrotic; a small population of monoclonal CD5 negative, CD10 negative B cells, representing approximately 4% of lymphocytes  -Noncompliant with follow-up  -Not a candidate for allogenic stem cell transplant because of comorbidities and noncompliance  -PRBC x8 between  02/19/2015-12/12/2017  -10/28/2020: Erythropoietin level?370.4  -EGD (11/04/2020) during hospitalization 10/28/2020-11/04/2020 ([facility]), negative; stool for occult blood was positive x3; outpatient colonoscopy was recommended  -Feraheme 510 mg IV x2 (11/20/2020, 11/27/2020)?(relative iron deficiency)  -Procrit 40,000 subcutaneously weekly, started 11/20/2020  ?   # ??Chronic anemia, hemoglobin ~8.5 g/dL  -Normocytic  -PRBC x8 (02/19/2015-12/12/2017)  -PRBC x4 units?(10/28/2020-11/04/2020; hemoglobin 6.9)  -EGD (11/04/2020) during hospitalization 10/28/2020-11/04/2020 ([facility]), negative; stool for occult blood was positive x3; outpatient colonoscopy was recommended  -Feraheme 510 mg IV x2 (11/20/2020, 11/27/2020)  -Procrit 40,000 subcutaneously weekly, started 11/20/2020  ?   # ??Iron deficiency:  -10/28/2020:?Serum iron 8, low. ?TIBC 192, low.? Transferrin saturation 4%, low.? Ferritin 294.35, elevated?(relative iron deficiency)  -Screening colonoscopy 04/27/2015, was normal  -Patient is status post gastric bypass?in 2000  -EGD (11/04/2020) during hospitalization 10/28/2020-11/04/2020 ([facility]), negative; stool for occult blood was positive x3; outpatient colonoscopy was recommended  -Colonoscopy being planned by [facility]  -Feraheme 510 mg IV x2 (11/20/2020, 11/27/2020)  ?   # ??Leukopenia and neutropenia  -WBC 2.3.? ANC 0.77?(secondary to myelofibrosis)  -Platelet count normal  ?   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   # ??Vitamin B12 deficiency  -B12 level 199 (09/01/2020)  -Iron stores normal (09/01/2020)  -10/28/2020:?Says that he has been taking?vitamin B12 orally, both tubal as well  as pills,?14-12 years?after gastric bypass surgery  -10/28/2020: B12 level 1773, elevated?(absorbing satisfactorily via oral route).? Intrinsic factor antibody negative.  ?   # ??Morbidly obese, status post gastric bypass surgery?(2000):  -10/28/2020: Weight 322.97 LBS; height 175 cm; BMI 47.84  ?   # ??Atrial fibrillation;?on anticoagulation with Xarelto  ?  #Alcohol abuse.?  #Noncompliant with follow-ups.  #History of epistaxis, requiring cauterization in the past  #History of gastric bypass?in 2000  #History of back surgery?in [place]?in 2016 or 2017  ?  Plan:  Supportive care?with periodic transfusions?(for symptomatic anemia?or if hemoglobin <7 g/dL)  Erythropoietin level <500  Procrit 40,000 units subcutaneously weekly, started?11/20/2020  Not a candidate for allogenic stem cell transplant because of comorbidities.  ?  Chronically anemic.  PRBC x8 (02/19/2015-12/12/2017)  PRBC x5 (10/28/2020-11/04/2020)  Usual?hemoglobin ~8.5 g/dL  ?  -Relative iron deficiency (labs: 10/28/2020)  -Feraheme?510 mg IV x2 (11/20/2020, 11/27/2020)  -Recheck iron stores 6 weeks post completion of Feraheme  -FIT?test  -Screening colonoscopy 04/27/2015, was normal  -Patient is status post gastric bypass?in 2000  -EGD (11/04/2020): Negative?([facility],?hospitalized?10/28/2020-11/04/2020, symptomatic anemia, heme positive stool)  -Colonoscopy being planned by?[facility]  ?  B12 deficiency was noted on 09/01/2020.  Intrinsic factor antibody negative  Apparently, has been?taking vitamin B12?orally,?chewable as well as pills, for last 10-12 years?after gastric bypass surgery.  -Absorbing satisfactorily via oral route?(labs:?10/28/2020; B12 level?1773)  ?  Check CBC every week?prior to each start of Procrit  ?  Continue supportive care measures;?Hgb?9.1; no transfusion indicated.  Continue Procrit weekly & weekly CBC monitoring.  Follow up in?2 weeks (F2F on 2/9/2021) with CBC, CMP.  Ordered:  ?   Problem List/Past Medical  History  Ongoing  Acute urinary retention  Anemia  BPH with urinary obstruction  Coagulopathy  Congestive heart failure  HTN (hypertension)  Hyponatremia  Leg pain  Liver function failure  LV dysfunction  Morbid obesity(  Probable Diagnosis  )  Myelofibrosis  Myofibrosis  Primary myelofibrosis  Primary myelofibrosis  Historical  Apnea, sleep  Back pain  Cataract  CHF - Congestive heart failure  Hypertension  Nose bleed  Procedure/Surgical History  Transfusion of Nonautologous Red Blood Cells into Peripheral Vein, Percutaneous Approach (12/11/2017)  BONE MARROW ASPIRATION PERFORMED WITH BONE MARROW BIOPSY THROUGH THE SAME INCISION ON THE SAME DATE OF SERVICE (05/25/2017)  Bone marrow; biopsy, needle or trocar (05/25/2017)  Drainage of Bone Marrow, Percutaneous Approach, Diagnostic (05/25/2017)  Extraction of Iliac Bone Marrow, Percutaneous Approach, Diagnostic (05/25/2017)  Drainage of Right Upper Extremity, Percutaneous Approach (10/03/2016)  Drainage of Right Lower Arm Skin, External Approach (10/01/2016)  Incision and drainage of abscess (eg, carbuncle, suppurative hidradenitis, cutaneous or subcutaneous abscess, cyst, furuncle, or paronychia); simple or single (10/01/2016)  Drainage of Bladder with Drainage Device, Via Natural or Artificial Opening (09/25/2016)  Insertion of temporary indwelling bladder catheter; simple (eg, Gordon) (09/25/2016)  Control nasal hemorrhage, anterior, simple (limited cautery and/or packing) any method (02/05/2016)  Packing of Nasal Region using Packing Material (02/05/2016)  Cataract Extraction Phacoemulsification (Right) (08/20/2015)  Extracapsular cataract removal with insertion of intraocular lens prosthesis (1 stage procedure), manual or mechanical technique (eg, irrigation and aspiration or phacoemulsification) (08/20/2015)  Insertion of intraocular lens prosthesis at time of cataract extraction, one-stage (08/20/2015)  Phacoemulsification and aspiration of cataract  (08/20/2015)  Cataract Extraction Phacoemulsification (Left) (06/09/2015)  Extracapsular cataract removal with insertion of intraocular lens prosthesis (1 stage procedure), manual or mechanical technique (eg, irrigation and aspiration or phacoemulsification) (06/09/2015)  Insertion of intraocular lens prosthesis at time of cataract extraction, one-stage (06/09/2015)  Phacoemulsification and aspiration of cataract (06/09/2015)  Colonoscopy (04/27/2015)  Colonoscopy (04/27/2015)  Colonoscopy, flexible; diagnostic, including collection of specimen(s) by brushing or washing, when performed (separate procedure) (04/27/2015)  Transfusion of packed cells (02/23/2015)  Transfusion of packed cells (02/20/2015)  Biopsy Bone Marrow Aspiration (.) (01/08/2014)  Biopsy of bone marrow (01/08/2014)  gastric bypass  hernia repair   Medications  bisacodyl 5 mg ORAL enteric coated tablet, 30 mg= 6 tab(s), Oral, Once  cyanocobalamin 1000 mcg oral tablet, 1000 mcg= 1 tab(s), Oral, Daily, 5 refills,? ?Not taking: duplicate  diltiazem 180 mg/24 hours oral CAPsule, extended release, 180 mg= 1 cap(s), Oral, Daily, 3 refills  ferrous sulfate 325 mg oral tablet, 325 mg= 1 tab(s), Oral, Every other day, 3 refills,? ?Not taking  Flomax 0.4 mg oral capsule, 0.4 mg= 1 cap(s), Oral, Daily, 3 refills  furosemide 40 mg oral tablet, 40 mg= 1 tab(s), Oral, BID, 3 refills  lisinopril 5 mg oral tablet, 5 mg= 1 tab(s), Oral, Daily, 3 refills  magnesium citrate oral liquid, 1 bottle(s)= 300 mL, Oral, Once  magnesium gluconate 500 mg oral tablet, 500 mg= 1 tab(s), Oral, BID,? ?Not Taking, Completed Rx  metoprolol tartrate 100 mg oral tablet, 100 mg= 1 tab(s), Oral, BID, 3 refills  Miralax - for colonoscopy prep, 238 gm= 14 packet(s), Oral, Once  Multi-Day Plus Minerals oral tablet, 1 tab(s), Oral, Daily  Pantoprazole 40 mg ORAL EC-Tablet, 40 mg= 1 tab(s), Oral, BID,? ?Not taking  Solumedrol IV push / IM, 125 mg= 2 mL, IV Push, Once  TNF Medl (Template  NonFormulary), See Instructions  Toradol 30 mg for IV Push, 30 mg= 1 mL, IV Push, Once  Vitamin B-12, 1 tab, Oral, TID  Vitamin D2 2000 intl units oral capsule, 2000 IntUnit= 1 cap(s), Oral, Daily, 3 refills  Allergies  No Known Allergies  Social History  Abuse/Neglect  No, No, Yes, 01/26/2021  Alcohol - High Risk, 12/04/2014  Past, Wine, 01/26/2021  Employment/School  disability, Work/School description: disabled. Operates hazardous equipment: No., 12/08/2016  Exercise - Does not exercise, 03/06/2015  Self assessment: Fair condition., 12/08/2016  Home/Environment  Lives with Spouse. Living situation: Home/Independent. Alcohol abuse in household: No. Substance abuse in household: No. Smoker in household: No. Injuries/Abuse/Neglect in household: No. Feels unsafe at home: No. Safe place to go: Yes. Family/Friends available for support: Yes. Concern for family members at home: No. Major illness in household: No. Financial concerns: No. TV/Computer concerns: No., 12/08/2016  Nutrition/Health  Caffeine intake amount: NONE. Wants to lose weight: Yes. Sleeping concerns: Yes. Feels highly stressed: No., 11/17/2015  Regular, 03/06/2015  Other  Sexual  Sexually active: Yes., 03/06/2015  Substance Use - Denies Substance Abuse, 12/04/2014  Never, 01/26/2021  Tobacco - Denies Tobacco Use, 12/04/2014  Never (less than 100 in lifetime), No, 01/26/2021  Family History  Alcohol user: Father.  Cataract.: Father.  Hyperlipidemia.: Brother.  Hypertension.: Father.  Tobacco user: Father.  Immunizations  Vaccine Date Status Comments   influenza virus vaccine, inactivated 09/26/2020 Given    influenza virus vaccine, inactivated 10/08/2019 Recorded    influenza virus vaccine, inactivated 10/05/2018 Given tolerated well   influenza virus vaccine, inactivated 12/13/2017 Given Nursing Judgment   influenza virus vaccine, inactivated 09/28/2016 Given    influenza virus vaccine, inactivated 11/17/2015 Given    influenza virus vaccine,  inactivated 02/25/2015 Given Med Not Available   pneumococcal 23-polyvalent vaccine 01/05/2014 Given    influenza virus vaccine, inactivated 01/05/2014 Given    Health Maintenance  Health Maintenance  ???Pending?(in the next year)  ??? ??OverDue  ??? ? ? ?Aspirin Therapy for CVD Prevention due??01/04/15??and every 1??year(s)  ??? ??Due?  ??? ? ? ?Alcohol Misuse Screening due??01/02/21??and every 1??year(s)  ??? ? ? ?Medicare Annual Wellness Exam due??01/26/21??and every 1??year(s)  ??? ? ? ?Tetanus Vaccine due??01/26/21??and every 10??year(s)  ??? ? ? ?Zoster Vaccine due??01/26/21??Unknown Frequency  ??? ??Due In Future?  ??? ? ? ?HF-Heart Failure Education not due until??11/04/21??and every 1??year(s)  ??? ? ? ?HF-LVEF not due until??11/04/21??and every 1??year(s)  ??? ? ? ?ADL Screening not due until??12/04/21??and every 1??year(s)  ??? ? ? ?Obesity Screening not due until??01/01/22??and every 1??year(s)  ???Satisfied?(in the past 1 year)  ??? ??Satisfied?  ??? ? ? ?ADL Screening on??12/04/20.??Satisfied by Yennifer Morris  ??? ? ? ?Blood Pressure Screening on??01/26/21.??Satisfied by Chelsea Zhou  ??? ? ? ?Body Mass Index Check on??01/26/21.??Satisfied by Chelsea Zhou  ??? ? ? ?Colorectal Screening on??12/18/20.??Satisfied by Radha Sosa  ??? ? ? ?Depression Screening on??01/26/21.??Satisfied by Chelsea Zhou  ??? ? ? ?Diabetes Screening on??01/26/21.??Satisfied by Magno, Nenita A.  ??? ? ? ?Hypertension Management-Blood Pressure on??01/26/21.??Satisfied by Chelsea Zhou  ??? ? ? ?Influenza Vaccine on??01/05/21.??Satisfied by Jessi Castro  ??? ? ? ?Lipid Screening on??02/06/20.??Satisfied by Dagmar Bullock  ??? ? ? ?Obesity Screening on??01/26/21.??Satisfied by Chelsea Zhou  ??? ??Refused?  ??? ? ? ?Influenza Vaccine on??12/04/20.??Recorded by Joann Healy  ?  Lab Results  Test Name Test Result Date/Time   Sodium Lvl 130 mmol/L (Low) 01/26/2021 12:27 CST   Potassium Lvl 5.4  mmol/L (High) 01/26/2021 12:27 CST   Chloride 90 mmol/L (Low) 01/26/2021 12:27 CST   CO2 32 mmol/L (High) 01/26/2021 12:27 CST   Calcium Lvl 8.5 mg/dL 01/26/2021 12:27 CST   Glucose Lvl 94 mg/dL 01/26/2021 12:27 CST   BUN 8.0 mg/dL (Low) 01/26/2021 12:27 CST   Creatinine 0.77 mg/dL 01/26/2021 12:27 CST   Est Creat Clearance Ser 104.22 mL/min 01/26/2021 13:25 CST   eGFR-AA >105 01/26/2021 12:27 CST   eGFR-DAVID >105 mL/min/1.73 m2 01/26/2021 12:27 CST   Bili Total 0.6 mg/dL 01/26/2021 12:27 CST   Bili Direct 0.2 mg/dL 01/26/2021 12:27 CST   Bili Indirect 0.40 mg/dL 01/26/2021 12:27 CST   AST 23 unit/L 01/26/2021 12:27 CST   ALT 11 unit/L 01/26/2021 12:27 CST   Alk Phos 125 unit/L 01/26/2021 12:27 CST   Total Protein 6.5 gm/dL 01/26/2021 12:27 CST   Albumin Lvl 3.7 gm/dL 01/26/2021 12:27 CST   Globulin 2.8 gm/dL 01/26/2021 12:27 CST   A/G Ratio 1.3 ratio 01/26/2021 12:27 CST   WBC 2.9 x10(3)/mcL (Low) 01/26/2021 12:27 CST   RBC 3.34 x10(6)/mcL (Low) 01/26/2021 12:27 CST   Hgb 9.1 gm/dL (Low) 01/26/2021 12:27 CST   Hct 28.7 % (Low) 01/26/2021 12:27 CST   Platelet 221 x10(3)/mcL 01/26/2021 12:27 CST   MCV 85.9 fL 01/26/2021 12:27 CST   MCH 27.2 pg 01/26/2021 12:27 CST   MCHC 31.7 gm/dL 01/26/2021 12:27 CST   RDW 20.8 % (High) 01/26/2021 12:27 CST   MPV 8.9 fL 01/26/2021 12:27 CST   Abs Neut 1.13 x10(3)/mcL (Low) 01/26/2021 12:27 CST

## 2022-05-03 NOTE — HISTORICAL [facility] CERNER
This is a historical note converted from [facility]. Formatting and pictures may have been removed.  Please reference [facility] for original formatting and attached multimedia. Chief Complaint  Pt. c/o SOB x 2 days. Pt. denies chest pain. Pt. with history of CHF and takes lasix daily. Pt. 93% on RA, 98% on 2 L  History of Present Illness  Pt is a 56 y/o CM with HFpEF 50% 9/2017, myelofibrosis requiring several admissions to transfuse multiple units of blood, HTN, urinary retention treated with Flomax, who presents today for a two days history of SAAVEDRA. Pt states that he noticed he began to be more SOB than usual two days ago; he states he can normally walk 20-30 ft before becoming short of breath in his normal state, but is now short of breath when walking less than 10 feet. He is also dyspneic when talking, and states that he cannot even improve his breathing when sitting at a 90 degree angle. Pt states he is compliant with his medications, and takes Lasix 40mg QD. Admits to dry cough w/o production and lower extremity swelling that occurs on and off.?Pt denies any N/V, F/C, sick contacts, burning with urination, blood in his stool, pleuritic pain, or missed Lasix doses. Pt?was?recently D/Cd from the hospital on 12/13 for dizziness, and has recently undergone lumbar surgery about 2 months ago for osteomyelitis sustained after suffering from a fall.  ?  Pro-BNP: 3804  Hgb: 8.6  WBC: 3.0  CXR: faint hazy opacities in RLL  ?  PMH: See HPI  SocHx: Former drinker 13 beers/day x 20+ years, quit drinking 1 year ago. Denies tobacco and illicits.  FmHx  ?Alcohol user: Father.  ?Cataract.: Father.  ?Hyperlipidemia.: Brother.  ?Hypertension.: Father.  ?Tobacco user: Father.  Surgical Hx: Lumbar surgery 2 months ago; colonoscopy negative within 10 years  Allergies: NKDA  ?  Review of Systems  See HPI for pertinent positives and negatives. 12 point ROS is otherwise negative.  Physical Exam  Vitals & Measurements  T:?37.2? ?C ?(Oral)?  HR:?84?(Monitored)? RR:?22? BP:?142/84? SpO2:?97%? WT:?145.3?kg?  General: Pt appears out of breath while sitting in hospital bed  HEENT:?Normocephalic; EOMI, pale conjunctiva; nasal septum is midline; no oral ulcerations; no pharyngeal erythema  Neck: Obese. Unable to ascertain for JVD  Respiratory:?LCTAB, no clubbing. Tachypneic and requiring 2L O2.  Cardiovascular:?RRR, no murmur or gallop appreciated on auscultation  Gastrointestinal:?Obese, NTTP x 4  Musculoskeletal:?Normal range of motion grossly; no peripheral edema;  Integumentary:?Patient appears pale  Neurologic:?AAOx3  Psychiatric: Appropriate mood and affect, normal judgment  ?  Assessment/Plan  CHF exacerbation  ?- 3804 BNP  ?-?Lasix 40mg IV given in ED. Starting pt on Lasix?40mg?BID IV  ?- Will defer to primary team to re-evaluate after first diuresis window  ?- Start Metoprolol 100mg BID  ?  Anemia 2/2 myelofibrosis  ?- H/H 8.6. Pt has history of a multitude of blood transfusions.?Pts lack of exertional reserve is almost certainly decreased by this hemodynamic state.  ?- Continue to follow pts H/H  ?  HTN: Lisinopril 5mg QD  ?  Urinary Retention: Flomax 0.4mg QD  ?  DVT Prophyl: Lovenox  ?  ?  ?  Disposition: Admit to Telemetry for observation and diuresis.   Problem List/Past Medical History  Ongoing  Acute urinary retention  Anemia  BPH with urinary obstruction  Cataract  Coagulopathy  Congestive heart failure  HTN (hypertension)  Hyponatremia  Leg pain  Liver function failure  LV dysfunction  Morbid obesity(  Probable Diagnosis  )  Myelofibrosis  Myofibrosis  Nose bleed  Primary myelofibrosis  Historical  Apnea, sleep  CHF - Congestive heart failure  Hypertension  Procedure/Surgical History  Transfusion of Nonautologous Red Blood Cells into Peripheral Vein, Percutaneous Approach (12/11/2017)  BONE MARROW ASPIRATION PERFORMED WITH BONE MARROW BIOPSY THROUGH THE SAME INCISION ON THE SAME DATE OF SERVICE (05/25/2017)  Bone marrow; biopsy, needle or  trocar (05/25/2017)  Drainage of Bone Marrow, Percutaneous Approach, Diagnostic (05/25/2017)  Extraction of Iliac Bone Marrow, Percutaneous Approach, Diagnostic (05/25/2017)  Drainage of Right Upper Extremity, Percutaneous Approach (10/03/2016)  Drainage of Right Lower Arm Skin, External Approach (10/01/2016)  Incision and drainage of abscess (eg, carbuncle, suppurative hidradenitis, cutaneous or subcutaneous abscess, cyst, furuncle, or paronychia); simple or single (10/01/2016)  Drainage of Bladder with Drainage Device, Via Natural or Artificial Opening (09/25/2016)  Insertion of temporary indwelling bladder catheter; simple (eg, Gordon) (09/25/2016)  Control nasal hemorrhage, anterior, simple (limited cautery and/or packing) any method (02/05/2016)  Packing of Nasal Region using Packing Material (02/05/2016)  Cataract Extraction Phacoemulsification (Right) (08/20/2015)  Extracapsular cataract removal with insertion of intraocular lens prosthesis (1 stage procedure), manual or mechanical technique (eg, irrigation and aspiration or phacoemulsification) (08/20/2015)  Insertion of intraocular lens prosthesis at time of cataract extraction, one-stage (08/20/2015)  Phacoemulsification and aspiration of cataract (08/20/2015)  Cataract Extraction Phacoemulsification (Left) (06/09/2015)  Extracapsular cataract removal with insertion of intraocular lens prosthesis (1 stage procedure), manual or mechanical technique (eg, irrigation and aspiration or phacoemulsification) (06/09/2015)  Insertion of intraocular lens prosthesis at time of cataract extraction, one-stage (06/09/2015)  Phacoemulsification and aspiration of cataract (06/09/2015)  Colonoscopy (04/27/2015)  Colonoscopy (04/27/2015)  Colonoscopy, flexible; diagnostic, including collection of specimen(s) by brushing or washing, when performed (separate procedure) (04/27/2015)  Transfusion of packed cells (02/23/2015)  Transfusion of packed cells (02/20/2015)  Biopsy Bone  Marrow Aspiration (.) (01/08/2014)  Biopsy of bone marrow (01/08/2014)  Gastric bypass operation (2004)  gastric bypass  hernia repair  Medications  Inpatient  acetaminophen, 1000 mg= 2 tab(s), Oral, q6hr, PRN  bisacodyl 5 mg ORAL enteric coated tablet, 30 mg= 6 tab(s), Oral, Once  Flomax 0.4 mg oral capsule, 0.4 mg= 1 cap(s), Oral, Daily  labetalol, 20 mg= 4 mL, IV Push, q2hr, PRN  lisinopril 5 mg oral tablet, 5 mg= 1 tab(s), Oral, Daily  Lovenox, 40 mg= 0.4 mL, Subcutaneous, Daily  magnesium citrate oral liquid, 1 bottle(s)= 300 mL, Oral, Once  metoprolol tartrate 100 mg oral tablet, 100 mg= 1 tab(s), Oral, BID  Miralax - for colonoscopy prep, 238 gm= 14 packet(s), Oral, Once  Solumedrol IV push / IM, 125 mg= 2 mL, IV Push, Once  Toradol 30 mg for IV Push, 30 mg= 1 mL, IV Push, Once  Zofran, 4 mg= 2 mL, IV Push, q4hr, PRN  Zofran, 8 mg= 4 mL, IV Piggyback, q4hr, PRN  Home  Flomax 0.4 mg oral capsule, 0.4 mg= 1 cap(s), Oral, Daily, 11 refills  Lasix 20 mg oral tablet, 20 mg= 1 tab(s), Oral, Daily  lisinopril 5 mg oral tablet, 5 mg= 1 tab(s), Oral, Daily, 11 refills  metoprolol tartrate 100 mg oral tablet, 100 mg= 1 tab(s), Oral, BID, 11 refills  Vitamin B12 1000 mcg/mL injectable solution, 1000 mcg, IM, qWeek  Vitamin D  Allergies  No Known Allergies  Social History  Alcohol - High Risk, 12/27/2017  Past  Current, Beer, Daily, 5 drinks/episode average. 9.00 drinks/episode maximum.  Current, Beer, Daily  Employment/School - 12/27/2017  disability, Work/School description: disabled. Operates hazardous equipment: No.  Exercise - Does not exercise, 12/27/2017  Self assessment: Fair condition.  Home/Environment - 12/27/2017  Lives with Spouse. Living situation: Home/Independent. Alcohol abuse in household: No. Substance abuse in household: No. Smoker in household: No. Injuries/Abuse/Neglect in household: No. Feels unsafe at home: No. Safe place to go: Yes. Family/Friends available for support: Yes. Concern for family  members at home: No. Major illness in household: No. Financial concerns: No. TV/Computer concerns: No.  Nutrition/Health - 12/27/2017  Caffeine intake amount: NONE. Wants to lose weight: Yes. Sleeping concerns: Yes. Feels highly stressed: No.  Regular  Other - 12/27/2017  Sexual - 12/27/2017  Sexually active: Yes.  Substance Abuse - Denies Substance Abuse, 12/27/2017  Never  Tobacco - Denies Tobacco Use, 12/27/2017  Never smoker  Family History  Alcohol user: Father.  Cataract.: Father.  Hyperlipidemia.: Brother.  Hypertension.: Father.  Tobacco user: Father.  Lab Results  Labs Last 24 Hours?  ?Chemistry Hematology/Coagulation   Sodium Lvl: 137 mmol/L (12/27/17 02:42:56) WBC:?3 x10(3)/mcL?Low (12/27/17 03:05:51)   Potassium Lvl: 4.4 mmol/L (12/27/17 02:42:56) RBC:?3.23 x10(6)/mcL?Low (12/27/17 03:05:51)   Chloride: 100 mmol/L (12/27/17 02:42:56) Hgb:?8.6 gm/dL?Low (12/27/17 03:05:51)   CO2: 28 mmol/L (12/27/17 02:42:56) Hct:?27.9 %?Low (12/27/17 03:05:51)   Calcium Lvl: 8.6 mg/dL (12/27/17 02:42:56) Platelet: 318 x10(3)/mcL (12/27/17 03:05:51)   Glucose Lvl: 105 mg/dL (12/27/17 02:42:56) MCV: 86.4 fL (12/27/17 03:05:51)   BUN: 13 mg/dL (12/27/17 02:42:56) MCH: 26.6 pg (12/27/17 03:05:51)   Creatinine: 0.6 mg/dL (12/27/17 02:42:56) MCHC:?30.8 gm/dL?Low (12/27/17 03:05:51)   eGFR-AA: >105 (12/27/17 02:42:57) RDW:?18.3 %?High (12/27/17 03:05:51)   eGFR-DAVID: >105 (12/27/17 02:43:00) MPV: 10.4 fL (12/27/17 03:05:51)   Bili Total: 0.4 mg/dL (12/27/17 02:42:56) Abs Neut:?1.76 x10(3)/mcL?Low (12/27/17 03:05:51)   Bili Direct: 0.1 mg/dL (12/27/17 02:42:56) Segs Man: 60 % (12/27/17 03:50:26)   Bili Indirect: 0.3 mg/dL (12/27/17 02:42:56) Band Man: 7 % (12/27/17 03:50:26)   AST: 20 unit/L (12/27/17 02:42:56) Lymph Man: 25 % (12/27/17 03:50:26)   ALT: 13 unit/L (12/27/17 02:42:56) Monocyte Man: 5 % (12/27/17 03:50:26)   Alk Phos: 109 unit/L (12/27/17 02:42:56) Eos Man: 1 % (12/27/17 03:50:26)   Total Protein: 7 gm/dL (12/27/17  02:42:56) Basophil Man: 0 % (12/27/17 03:50:26)   Albumin Lvl:?3.1 gm/dL?Low (12/27/17 02:42:56) Myelo Man: 2 % (12/27/17 03:50:26)   Globulin:?3.9 gm/mL?High (12/27/17 02:42:56) NRBC Man: 5 % (12/27/17 03:50:26)   A/G Ratio: 1 ratio (12/27/17 02:42:56) Hypochrom: 1+ (12/27/17 03:50:26)   NT pro BNP.:?3804 pg/mL?High (12/27/17 02:43:01) Platelet Est: Normal (12/27/17 03:50:26)   Total CK:?32 unit/L?Low (12/27/17 02:42:58) Anisocyte: 2+ (12/27/17 03:50:26)   CK MB:?<1.0?Low (12/27/17 02:42:59) Poik: 1+ (12/27/17 03:50:26)   POC Troponin: 0 ng/mL (12/27/17 03:00:17) Polychrom: 1+ (12/27/17 03:50:26)    RBC Morph: Abnormal (12/27/17 03:50:26)    Schistocyte: 1+ (12/27/17 03:50:26)    Tear drop cell: 1+ (12/27/17 03:50:26)   ?  ?  ?  ?      I saw the patient with the residents and discussed the case, assisted with the development of the assessment and agree with the plan of care.? Pt admitted for CHF exacerbation in a background of HTN, BPH, and? myelofibrosis.? Will plug pt into CHF resources of the [facility] system including working on d/s education.? Pt endorses has completed course of abx for osteomyelitis and will have wound care look at his surgical wound.

## 2022-05-03 NOTE — HISTORICAL [facility] CERNER
This is a historical note converted from [employer]. Formatting and pictures may have been removed.  Please reference [employer] for original formatting and attached multimedia. Chief Complaint  called 911 for SOB, weakness; denies other symptoms; currently disoriented  History of Present Illness  PCP: Windy Warner MD  CODE STATUS: Full  ?  Mr. Scott is a 59-year-old obese  male with a significant history of HFpEF, myelofibrosis, anemia and EtOH abuse who presented emergency department via EMS after an abrupt onset of unresponsiveness and confusion.? Patient reports that he woke up this morning feeling fine.? Later on watching TV on the couch he suddenly became confused and tremulous.? He felt like his hearing and vision was fading.? He denies any complete loss of consciousness.? He called his son who is also at bedside who reports that he sounded confused over the phone.? He also called his  who after seeing him called EMS.? EMS reported 2 or 3 episodes of decreased responsiveness while in route.? On arrival to the ED he noted the symptoms are improving.? He denies any other symptoms; no chest pain, shortness of breath, palpitations, headache, lateralizing weakness, aphasia or slurred speech.? States that he had a similar episode a year ago and was seen at outside hospital.? Also reports that he recently seen his PCP was noted to have fluid around his heart and his awaiting a cardiologist appointment.? He admits to drinking 15-30 beers daily but recently quit a week or 2 ago.? On arrival to the ED BP was afebrile, hypertensive 158/96 and hypoxic on room air.? He was awake alert and oriented, there were no focal neuro deficits on exam.? Electrolytes were unremarkable?with exception of hypomagnesia, BNP mildly elevated to 67, WBC was 3.3, platelet count was 123, and H&H was 9.1/30.? His ethanol level was < 3.? CT head was unremarkable, CT abdomen showed small bowel signs and hepatosplenomegaly  otherwise no acute findings, and CT thorax noted cardiomegaly with small pericardial effusion and minimal pulmonary edema and was negative for pulmonary embolus.? Started with antibiotic in the ED and was admitted to the hospital service for further evaluation.  Review of Systems  Except as documented all systems reviewed and negative  Physical Exam  Vitals & Measurements  T:?36.7? ?C (Oral)? TMIN:?36.7? ?C (Oral)? TMAX:?37? ?C (Oral)? HR:?95(Peripheral)? RR:?27? BP:?149/72? SpO2:?99%? WT:?160?kg?  General:?NAD, nontoxic appearing, obese, alert and oriented x4  Eye: PERRLA, clear conjunctiva  HENT:?moist mucus membranes, normocephalic  Neck: full range of motion, no lymphadenopathy  Respiratory:?Mild end expiratory wheeze?right lower lobe,?no rhonchi or rails,?No Respiratory Distress?saturating 90% on 2 L nasal cannula  Cardiovascular:?Tachycardic, regular rhythm without murmurs, gallops or rubs, no JVD or peripheral edema  Gastrointestinal:?soft, non-tender, non-distended, active bowel sounds  Genitourinary: no CVA tenderness to palpation  Musculoskeletal:?full range of motion of all extremities  Integumentary: no rashes or skin lesions present  Neurologic: cranial nerves intact, no signs of peripheral neurological deficit, motor/sensory function intact, no dysarthria or aphasia  ?   Medications (8) Active  Scheduled: (6)  furosemide 40 mg Tab UD ?40 mg 1 tab(s), Oral, Daily  lisinopril 5 mg Tab UD ?5 mg 1 tab(s), Oral, Daily  magnesium sulfate INJ ?4 gm 100 mL, IV Piggyback, Once  metoprolol tartrate 50 mg Tab UD ?100 mg 2 tab(s), Oral, BID  multivitamin Adult inj. 10 mL + folic acid 1 mg + thiamine 100 mg + sodium chloride 0.9% 1,000 mL ?10 mL, IV, Daily  tamsulosin 0.4 mg Cap ?0.4 mg 1 cap(s), Oral, Daily  Continuous: (0)  PRN: (2)  LORazepam 2 mg/ml Inj ?1 mg 0.5 mL, IV Push, q4hr  ondansetron 2 mg/mL inj - 2mL ?4 mg 2 mL, IV Push, q4hr  Assessment/Plan  Syncope versus  seizure  Hypomagnesia  Myelofibrosis?  Pancytopenia  Small pericardial effusion  Heart failure preserved ejection fraction (60%)-compensated  History of EtOH abuse-abrupt cessation?x1 week ago  History of gastric bypass  ?  Plan:  MRI brain rule out CVA,?EEG, orthostatic vital signs, telemetry monitoring  B12, folate, iron panel, ferritin  Replace?as needed?vitamin and minerals  Banana bag daily  Monitor for seizures and alcohol withdrawal  Continue home medications  ?  ?  I, Gerard Conteh PA-C, have seen and discussed this patients case with Dr.?Patel RODNEY  Please see addendum section and the rest of the note for further information on patient assessment and treatment  ?   I Dr. FANNY Hatfield MD?agree with the above,?rest of?the?HPI, exam, assessment and plan as below  For this patient encounter, I reviewed the NP/PA documentation, treatment plan, and medical decision making. I had face-to-face time with this patient.  59-year-old lizette with a history of extensive alcohol abuse came in with intermittent awareness.? On my exam he seems to be doing well and in no acute distress, lungs are clear, abdomen is soft, he is alert and oriented x4, able to move all extremities.  ?  Nutritional deficiency  Suspected pancytopenia due to nutritional deficiency  Alcohol abuse  Syncope/seizure rule out  ?  MRI brain, EEG, orthostatic vitals, telemetry monitoring, B12, folate, ferritin and replace as needed  Banana bag   Problem List/Past Medical History  Ongoing  Acute urinary retention  Anemia  BPH with urinary obstruction  Coagulopathy  Congestive heart failure  HTN (hypertension)  Hyponatremia  Leg pain  Liver function failure  LV dysfunction  Morbid obesity(  Probable Diagnosis  )  Myelofibrosis  Myofibrosis  Primary myelofibrosis  Historical  Apnea, sleep  Back pain  Cataract  CHF - Congestive heart failure  Hypertension  Nose bleed  Procedure/Surgical History  Transfusion of Nonautologous Red Blood Cells into Peripheral Vein,  Percutaneous Approach (12/11/2017)  BONE MARROW ASPIRATION PERFORMED WITH BONE MARROW BIOPSY THROUGH THE SAME INCISION ON THE SAME DATE OF SERVICE (05/25/2017)  Bone marrow; biopsy, needle or trocar (05/25/2017)  Drainage of Bone Marrow, Percutaneous Approach, Diagnostic (05/25/2017)  Extraction of Iliac Bone Marrow, Percutaneous Approach, Diagnostic (05/25/2017)  Drainage of Right Upper Extremity, Percutaneous Approach (10/03/2016)  Drainage of Right Lower Arm Skin, External Approach (10/01/2016)  Incision and drainage of abscess (eg, carbuncle, suppurative hidradenitis, cutaneous or subcutaneous abscess, cyst, furuncle, or paronychia); simple or single (10/01/2016)  Drainage of Bladder with Drainage Device, Via Natural or Artificial Opening (09/25/2016)  Insertion of temporary indwelling bladder catheter; simple (eg, Gordon) (09/25/2016)  Control nasal hemorrhage, anterior, simple (limited cautery and/or packing) any method (02/05/2016)  Packing of Nasal Region using Packing Material (02/05/2016)  Cataract Extraction Phacoemulsification (Right) (08/20/2015)  Extracapsular cataract removal with insertion of intraocular lens prosthesis (1 stage procedure), manual or mechanical technique (eg, irrigation and aspiration or phacoemulsification) (08/20/2015)  Insertion of intraocular lens prosthesis at time of cataract extraction, one-stage (08/20/2015)  Phacoemulsification and aspiration of cataract (08/20/2015)  Cataract Extraction Phacoemulsification (Left) (06/09/2015)  Extracapsular cataract removal with insertion of intraocular lens prosthesis (1 stage procedure), manual or mechanical technique (eg, irrigation and aspiration or phacoemulsification) (06/09/2015)  Insertion of intraocular lens prosthesis at time of cataract extraction, one-stage (06/09/2015)  Phacoemulsification and aspiration of cataract (06/09/2015)  Colonoscopy (04/27/2015)  Colonoscopy (04/27/2015)  Colonoscopy, flexible; diagnostic, including  collection of specimen(s) by brushing or washing, when performed (separate procedure) (04/27/2015)  Transfusion of packed cells (02/23/2015)  Transfusion of packed cells (02/20/2015)  Biopsy Bone Marrow Aspiration (.) (01/08/2014)  Biopsy of bone marrow (01/08/2014)  gastric bypass  hernia repair   Medications  Inpatient  Ativan, 1 mg= 0.5 mL, IV Push, q4hr, PRN  bisacodyl 5 mg ORAL enteric coated tablet, 30 mg= 6 tab(s), Oral, Once  magnesium citrate oral liquid, 1 bottle(s)= 300 mL, Oral, Once  Magnesium Sulfate 2gm/50ml IVPB (Premix), 4 gm= 100 mL, IV Piggyback, Once  Miralax - for colonoscopy prep, 238 gm= 14 packet(s), Oral, Once  Multivitamin (MVI) additive 10 mL + Folic Acid for Infusion 1 mg + thiamine additive for infusion 1  Solumedrol IV push / IM, 125 mg= 2 mL, IV Push, Once  Toradol 30 mg for IV Push, 30 mg= 1 mL, IV Push, Once  Zofran, 4 mg= 2 mL, IV Push, q4hr, PRN  Home  Flomax 0.4 mg oral capsule, 0.4 mg= 1 cap(s), Oral, Daily, 3 refills  Lasix 40 mg oral tablet, 40 mg= 1 tab(s), Oral, Daily, 3 refills  lisinopril 5 mg oral tablet, 5 mg= 1 tab(s), Oral, Daily, 3 refills  metoprolol tartrate 100 mg oral tablet, 100 mg= 1 tab(s), Oral, BID, 3 refills  Multi-Day Plus Minerals oral tablet, 1 tab(s), Oral, Daily  Vitamin D2 2000 intl units oral capsule, 2000 IntUnit= 1 cap(s), Oral, Daily, 3 refills  Allergies  No Known Allergies  Social History  Abuse/Neglect  No, No, Yes, 02/11/2020  Alcohol - High Risk, 12/04/2014  Current, Beer, 3-5 times per week, Alcohol use interferes with work or home: No. Others hurt by drinking: No. Household alcohol concerns: No., 02/11/2020  Current, Beer, 1-2 times per month, Alcohol use interferes with work or home: No. Others hurt by drinking: No. Household alcohol concerns: No., 08/13/2019  Past, 12/27/2017  Current, Beer, Daily, 5 drinks/episode average. 9.00 drinks/episode maximum., 05/22/2017  Current, Beer, Daily, 02/01/2017  Employment/School  disability, Work/School  description: disabled. Operates hazardous equipment: No., 12/08/2016  Exercise - Does not exercise, 03/06/2015  Self assessment: Fair condition., 12/08/2016  Home/Environment  Lives with Spouse. Living situation: Home/Independent. Alcohol abuse in household: No. Substance abuse in household: No. Smoker in household: No. Injuries/Abuse/Neglect in household: No. Feels unsafe at home: No. Safe place to go: Yes. Family/Friends available for support: Yes. Concern for family members at home: No. Major illness in household: No. Financial concerns: No. TV/Computer concerns: No., 12/08/2016  Nutrition/Health  Caffeine intake amount: NONE. Wants to lose weight: Yes. Sleeping concerns: Yes. Feels highly stressed: No., 11/17/2015  Regular, 03/06/2015  Other  Sexual  Sexually active: Yes., 03/06/2015  Substance Use - Denies Substance Abuse, 12/04/2014  Never, 02/11/2020  Never, 05/20/2016  Tobacco - Denies Tobacco Use, 12/04/2014  Never (less than 100 in lifetime), N/A, 02/11/2020  Never (less than 100 in lifetime), N/A, 08/13/2019  Never (less than 100 in lifetime), No, 06/08/2019  Never smoker, 09/25/2016  Family History  Alcohol user: Father.  Cataract.: Father.  Hyperlipidemia.: Brother.  Hypertension.: Father.  Tobacco user: Father.  Immunizations  Vaccine Date Status Comments   influenza virus vaccine, inactivated 10/08/2019 Recorded    influenza virus vaccine, inactivated 10/05/2018 Given tolerated well   influenza virus vaccine, inactivated 12/13/2017 Given Nursing Judgment   influenza virus vaccine, inactivated 09/28/2016 Given    influenza virus vaccine, inactivated 11/17/2015 Given    influenza virus vaccine, inactivated 02/25/2015 Given Med Not Available   pneumococcal 23-polyvalent vaccine 01/05/2014 Given    influenza virus vaccine, inactivated 01/05/2014 Given    Lab Results  Labs?(Last four charted values)  WBC ?????L?2.3???(MAR 19)  Hgb ?????L?9.1???(MAR 19)  Hct ?????L?30.3???(MAR 19)  Plt ?????L?123???(MAR  19)  Na ?????L?134???(MAR 19)  K ?????4.9???(MAR 19)  CO2 ?????23.0???(MAR 19)  Cl ?????104???(MAR 19)  Cr ?????0.89???(MAR 19)  BUN ?????14.0???(MAR 19)  Glucose Random ???????H?160???(MAR 19)  Diagnostic Results  Accession:?[id number]  Order:?CT Abdomen and Pelvis W Contrast  Report Dt/Tm:?03/19/2020 13:58  Report:?  ?  CLINICAL: Abdominal pain.  ?  TECHNIQUE: ?Multidetector axial images were performed of the abdomen  and pelvis following intravenous contrast administration and the  images were reformatted.?  ?  Automated exposure control was utilized to minimize radiation dose.  ?  COMPARISON: January 12, 2016.  ?  FINDINGS: ?There is unchanged hepatosplenomegaly with hepatic maximum  mid clavicular line diameter of 17.5 cm and maximum splenic  craniocaudal dimension of 22.0 cm. There is no focal space-occupying  lesion of the liver or the spleen. No intraperitoneal free fluid  indicative of ascites. Gallbladder lumen in the dependent portion  contains small calcified calculi without thickened wall and there is  no apparent dilatation of ducts. No acute pancreatic findings. Left  adrenal gland mild nodular enlargement is stable. Unremarkable right  adrenal gland. Kidneys cortical volume is adequate and the collecting  systems are without dilatation. Inferior vena cava filter. No  retroperitoneal periaortic enlarging lymph nodes.  ?  Gastric operative changes which is small in volume. No abnormal  dilatation of small bowel loops. There is mild excessive fluid within  the portion of the colon without transition point. No pericolonic  acute strandings. No free fluid. Ventral abdomen postsurgical changes  without hernia defect.  ?  Urinary bladder wall is not thickened. No pelvic free fluid.  ?  There is no compression involving L1 vertebral body. There is disc  device at L2-L3. Thoracolumbar posterior fusions.?  ?  IMPRESSION:  ?  1. ?Hepatosplenomegaly, unchanged.  2. ?Small gallstones.  3. ?No acute obstructive  or inflammatory findings identified.  4  ?  Accession:?[id number]  Order:?CT Angio Chest PE W Contrast  Report Dt/Tm:?03/19/2020 13:47  Report:?  ?  ?  CLINICAL: ?Pulmonary embolism.  ?  TECHNIQUE: ?Sequential axial images performed of the chest using  pulmonary embolism protocol following intravenous contrast bolus.?  Sagittal and contrast reformations performed.  ?  Dose product length of 2637 mGycm. Automated exposure control was  utilized to minimize radiation dose.  ?  Comparison made to January 5, 2014 CT chest.?  ?  FINDINGS: This less than optimal contrast opacification of the  pulmonary artery system. No definite evidence of filling defects from  pulmonary thromboembolism within the main pulmonary artery and the  major branches. Thoracic aorta is without aneurysmal dilatation. Heart  is enlarged in size. There is small moderate pericardial effusion.  Bilateral lungs minimal groundglass opacities reflect minimal  congestive process. No dense consolidation focally or segmentally.  ?  There are no enlarged chest lymph nodes. No fluid within the pleural  spaces. Thoracic kyphoscoliosis. There is T11 vertebral body  compression deformity and posterior lumbar fusions.?  ?  IMPRESSION:  ?  1. No pulmonary embolism identified.  2. Cardiomegaly, small pericardial effusion and minimal pulmonary  edema.  ?  ?  Accession:?[id number]  Order:?XR Chest 1 View  Report Dt/Tm:?03/19/2020 11:36  Report:?  Clinical History  Weakness  ?  Technique  Portable Single view AP radiograph of the chest.  ?  Comparison  6/8/2019  ?  Findings  No focal opacification, pleural effusion, or pneumothorax. The  cardiomediastinal silhouette remains prominent. No acute osseous  abnormality.  ?  IMPRESSION:  No acute cardiopulmonary process.  ?  Accession:?[id number]  Order:?CT Head W/O Contrast  Report Dt/Tm:?03/19/2020 11:21  Report:?  Clinical History:  Altered level of consciousness  ?  Technique:  Axial CT of the brain were obtained  without contrast. There are  osseous reconstructed images available for review with coronal and  sagittal reconstructions.  ?  Automatic exposure control was utilized to reduce the patients  radiation dose.  ?  Comparison:  6/8/2019  ?  Findings:  No acute intracranial hemorrhage.  Diffuse cerebral atrophy with concordant ventricular enlargement.?  There is normal gray white differentiation.?  The osseous structures are normal.?  The mastoid air cells are clear.?  The auditory canals are patent bilaterally.  The globes and orbital contents are normal bilaterally.  Complete opacification of the right maxillary sinus.  ?  Impression  No acute intracranial hemorrhage. Findings of chronic microvascular  ischemic disease.  Opacification of the right maxillary sinus.      also complain of cough and nasal congestion. no fever. no recent travel or sick exposure or exposed [facility]. resp pcr panel ordered. may be a viral ear infection